# Patient Record
Sex: FEMALE | Race: WHITE | NOT HISPANIC OR LATINO | Employment: UNEMPLOYED | URBAN - METROPOLITAN AREA
[De-identification: names, ages, dates, MRNs, and addresses within clinical notes are randomized per-mention and may not be internally consistent; named-entity substitution may affect disease eponyms.]

---

## 2017-01-04 ENCOUNTER — INFUSION (OUTPATIENT)
Dept: INFUSION THERAPY | Facility: HOSPITAL | Age: 62
End: 2017-01-04
Attending: INTERNAL MEDICINE
Payer: COMMERCIAL

## 2017-01-04 DIAGNOSIS — C80.1 CANCER: Primary | ICD-10-CM

## 2017-01-04 PROCEDURE — 96401 CHEMO ANTI-NEOPL SQ/IM: CPT | Mod: PN

## 2017-01-04 PROCEDURE — 63600175 PHARM REV CODE 636 W HCPCS: Mod: PN

## 2017-01-04 RX ADMIN — DENOSUMAB 120 MG: 120 INJECTION SUBCUTANEOUS at 12:01

## 2017-01-04 NOTE — PLAN OF CARE
Problem: Patient Care Overview  Goal: Plan of Care Review  Outcome: Ongoing (interventions implemented as appropriate)  Pt. Completed treatment, tolerated xgeva injection without noted distress.Vtial signs stable. Patient discharged from infusion center . Patient had all present questions answered. Reviewed upcoming appointments.

## 2017-01-04 NOTE — MR AVS SNAPSHOT
Patient Information     Patient Name Sex Yasmin Lyon Female 1955      Visit Information        Provider Department Dept Phone Center    2017 12:30 PM CHAIR 15, [facility] [phone] [facility] at [facility]      Patient Instructions     None      Your Current Medications Are     aspirin 81 MG Chew    atorvastatin (LIPITOR) 40 MG tablet    clopidogrel (PLAVIX) 75 mg tablet    fluticasone (FLONASE) 50 mcg/actuation nasal spray    glyBURIDE-metformin 5-500 mg (GLUCOVANCE) 5-500 mg Tab    letrozole (FEMARA) 2.5 mg Tab    levothyroxine 50 mcg Cap    liothyronine (CYTOMEL) 5 MCG Tab    lisinopril (PRINIVIL,ZESTRIL) 20 MG tablet    ondansetron (ZOFRAN-ODT) 8 MG TbDL    ONETOUCH ULTRA TEST Strp    oxycodone-acetaminophen (PERCOCET)  mg per tablet    zolpidem (AMBIEN CR) 12.5 MG CR tablet      Facility-Administered Medications     denosumab (XGEVA) solution 120 mg      Appointments for Next Year     2017 11:30 AM ESTABLISHED PATIENT (15 min.) [facility] Oncology Dipesh Betancourt DO    Arrive at check-in approximately 15 minutes before your scheduled appointment time. Bring all outside medical records and imaging, along with a list of your current medications and insurance card.    2017  1:30 PM INFUSION 030 MIN (30 min.) [facility] CHAIR 23, [facility] CHEMO    Arrive at check-in approximately 15 minutes before your scheduled appointment time. Bring all outside medical records and imaging, along with a list of your current medications and insurance card.    1st Floor    2017 12:30 PM ESTABLISHED PATIENT (30 min.) [facility] - Cardiology Mirela Ivan MD    Arrive at check-in approximately 15 minutes before your scheduled appointment time. Bring all outside medical records and imaging, along with a list of your current medications and insurance card.         Default Flowsheet Data (last 24 hours)      Amb Complex  Vitals [place]        01/04/17 1149                Measurements    Weight 86.2 kg (190 lb)        /75        Temp 96.8 °F (36 °C)        Pulse 80        Resp 16        Pain Assessment    Pain Score Zero                Allergies     Bactrim [Sulfamethoxazole-trimethoprim] Other (See Comments)    Cause tongue to turn black    Morphine Rash    Phenergan [Promethazine] Other (See Comments)      Medications You Received from 01/03/2017 1243 to 01/04/2017 1243        Date/Time Order Dose Route Action     01/04/2017 1241 denosumab (XGEVA) solution 120 mg 120 mg Subcutaneous Given      Current Discharge Medication List     Cannot display discharge medications since this is not an admission.

## 2017-02-01 ENCOUNTER — INFUSION (OUTPATIENT)
Dept: INFUSION THERAPY | Facility: HOSPITAL | Age: 62
End: 2017-02-01
Attending: INTERNAL MEDICINE
Payer: COMMERCIAL

## 2017-02-01 DIAGNOSIS — C80.1 CANCER: Primary | ICD-10-CM

## 2017-02-01 PROCEDURE — 63600175 PHARM REV CODE 636 W HCPCS: Mod: PN

## 2017-02-01 PROCEDURE — 96401 CHEMO ANTI-NEOPL SQ/IM: CPT | Mod: PN

## 2017-02-01 RX ADMIN — DENOSUMAB 120 MG: 120 INJECTION SUBCUTANEOUS at 01:02

## 2017-02-01 NOTE — MR AVS SNAPSHOT
"Patient Information     Patient Name Sex Yasmin Lyon Female 1955      Visit Information        Provider Department Dept Phone Center    2017 1:30 PM CHAIR Live [facility] [phone] [facility] at [facility]      Patient Instructions     None      Your Current Medications Are     aspirin 81 MG Chew    atorvastatin (LIPITOR) 40 MG tablet    clopidogrel (PLAVIX) 75 mg tablet    fluticasone (FLONASE) 50 mcg/actuation nasal spray    glyBURIDE-metformin 5-500 mg (GLUCOVANCE) 5-500 mg Tab    letrozole (FEMARA) 2.5 mg Tab    levothyroxine (SYNTHROID) 50 MCG tablet    levothyroxine 50 mcg Cap    liothyronine (CYTOMEL) 5 MCG Tab    lisinopril (PRINIVIL,ZESTRIL) 20 MG tablet    ondansetron (ZOFRAN-ODT) 8 MG TbDL    ONETOUCH ULTRA TEST Strp    oxycodone-acetaminophen (PERCOCET)  mg per tablet    zolpidem (AMBIEN CR) 12.5 MG CR tablet      Facility-Administered Medications     denosumab (XGEVA) solution 120 mg      Appointments for Next Year     3/1/2017  2:00 PM ESTABLISHED PATIENT (15 min.) [facility] Oncology Dipesh Betancourt DO    Arrive at check-in approximately 15 minutes before your scheduled appointment time. Bring all outside medical records and imaging, along with a list of your current medications and insurance card.    2017 12:30 PM ESTABLISHED PATIENT (30 min.) [facility] - Cardiology Mriela Ivan MD    Arrive at check-in approximately 15 minutes before your scheduled appointment time. Bring all outside medical records and imaging, along with a list of your current medications and insurance card.         Default Flowsheet Data (last 24 hours)      Amb Complex Vitals [place]        17 1127                Measurements    Weight 92.3 kg (203 lb 6 oz)        Height 5' 4" (1.626 m)        BSA (Calculated - sq m) 2.04 sq meters        BMI (Calculated) 35        BP (!)  156/90        Temp 97.8 °F (36.6 °C)        Pulse 93        Resp 16   "      Pain Assessment    Pain Score Zero                Allergies     Bactrim [Sulfamethoxazole-trimethoprim] Other (See Comments)    Cause tongue to turn black    Morphine Rash    Phenergan [Promethazine] Other (See Comments)      Medications You Received from 01/31/2017 8893 to 02/01/2017 1333        Date/Time Order Dose Route Action     02/01/2017 1327 denosumab (XGEVA) solution 120 mg 120 mg Subcutaneous Given      Current Discharge Medication List     Cannot display discharge medications since this is not an admission.

## 2017-03-01 ENCOUNTER — INFUSION (OUTPATIENT)
Dept: INFUSION THERAPY | Facility: HOSPITAL | Age: 62
End: 2017-03-01
Attending: INTERNAL MEDICINE
Payer: COMMERCIAL

## 2017-03-01 DIAGNOSIS — C80.1 CANCER: Primary | ICD-10-CM

## 2017-03-01 PROCEDURE — 96401 CHEMO ANTI-NEOPL SQ/IM: CPT | Mod: PN

## 2017-03-01 PROCEDURE — 63600175 PHARM REV CODE 636 W HCPCS: Mod: PN | Performed by: PHYSICIAN ASSISTANT

## 2017-03-01 RX ADMIN — DENOSUMAB 120 MG: 120 INJECTION SUBCUTANEOUS at 02:03

## 2017-03-01 NOTE — MR AVS SNAPSHOT
Patient Information     Patient Name Sex Yasmin Lyon Female 1955      Visit Information        Provider Department Dept Phone Center    3/1/2017 2:30 PM CHAIR 29, [facility] CHEMO [facility] [phone] [facility] at [facility]      Patient Instructions     None      Your Current Medications Are     aspirin 81 MG Chew    atorvastatin (LIPITOR) 40 MG tablet    clopidogrel (PLAVIX) 75 mg tablet    fluticasone (FLONASE) 50 mcg/actuation nasal spray    glyBURIDE-metformin 5-500 mg (GLUCOVANCE) 5-500 mg Tab    letrozole (FEMARA) 2.5 mg Tab    levothyroxine (SYNTHROID) 50 MCG tablet    levothyroxine 50 mcg Cap    liothyronine (CYTOMEL) 5 MCG Tab    lisinopril (PRINIVIL,ZESTRIL) 20 MG tablet    ondansetron (ZOFRAN-ODT) 8 MG TbDL    ONETOUCH ULTRA TEST Strp    oxycodone-acetaminophen (PERCOCET)  mg per tablet    zolpidem (AMBIEN CR) 12.5 MG CR tablet      Facility-Administered Medications     denosumab (XGEVA) solution 120 mg      Appointments for Next Year     3/29/2017  2:45 PM ESTABLISHED PATIENT (15 min.) [facility] Oncology Dipesh Betancourt DO    Arrive at check-in approximately 15 minutes before your scheduled appointment time. Bring all outside medical records and imaging, along with a list of your current medications and insurance card.    3/29/2017  3:30 PM INFUSION 030 MIN (30 min.) [facility] CHAIR 19, [facility]HS CHEMO    Arrive at check-in approximately 15 minutes before your scheduled appointment time. Bring all outside medical records and imaging, along with a list of your current medications and insurance card.    1st Floor    2017 12:30 PM ESTABLISHED PATIENT (30 min.) [facility] - Cardiology Mirela Ivan MD    Arrive at check-in approximately 15 minutes before your scheduled appointment time. Bring all outside medical records and imaging, along with a list of your current medications and insurance card.         Default  Flowsheet Data (last 24 hours)      Amb Complex Vitals [place]        03/01/17 1438 03/01/17 1405             Measurements    Weight  95.5 kg (210 lb 8 oz)       BP  127/79       Temp  97.2 °F (36.2 °C)       Pulse  75       Resp  16       Pain Assessment    Pain Score Zero Zero               Allergies     Bactrim [Sulfamethoxazole-trimethoprim] Other (See Comments)    Cause tongue to turn black    Morphine Rash    Phenergan [Promethazine] Other (See Comments)      Medications You Received from 02/28/2017 1442 to 03/01/2017 1442        Date/Time Order Dose Route Action     03/01/2017 1439 denosumab (XGEVA) solution 120 mg 120 mg Subcutaneous Given      Current Discharge Medication List     Cannot display discharge medications since this is not an admission.

## 2017-03-01 NOTE — PLAN OF CARE
Problem: Patient Care Overview  Goal: Plan of Care Review  Outcome: Ongoing (interventions implemented as appropriate)  Pt tolerated Xgeva injection well.  Reminded to inform dentist prior to any dental procedures.  Instructed to call MD with any problems.

## 2017-03-29 ENCOUNTER — INFUSION (OUTPATIENT)
Dept: INFUSION THERAPY | Facility: HOSPITAL | Age: 62
End: 2017-03-29
Attending: INTERNAL MEDICINE
Payer: COMMERCIAL

## 2017-03-29 DIAGNOSIS — C80.1 CANCER: Primary | ICD-10-CM

## 2017-03-29 PROCEDURE — 63600175 PHARM REV CODE 636 W HCPCS: Mod: PN | Performed by: PHYSICIAN ASSISTANT

## 2017-03-29 PROCEDURE — 96401 CHEMO ANTI-NEOPL SQ/IM: CPT | Mod: PN

## 2017-03-29 RX ADMIN — DENOSUMAB 120 MG: 120 INJECTION SUBCUTANEOUS at 03:03

## 2017-03-29 NOTE — MR AVS SNAPSHOT
"Patient Information     Patient Name Sex Yasmin Lyon Female 1955      Visit Information        Provider Department Dept Phone Center    3/29/2017 3:30 PM CHAIR 08 [facility] CHE[facility] [phone] [facility] at [facility]      Patient Instructions      Calcium (Blood)  Does this test have other names?  Total calcium, ionized calcium  What is this test?  A calcium blood test measures how much calcium is in your blood. Your health care provider can use this test to help diagnose and watch many conditions. There are two types of calcium blood tests. One is total calcium and the other is ionized calcium. Ionized calcium measures the "free" calcium in your blood. This is the calcium not bound to other parts of the blood.   Why do I need this test?  Your health care provider may order a calcium blood test to help diagnose a variety of disorders. These include kidney disease, pancreatitis, and disease of the parathyroid gland. Calcium levels may also be abnormal in many types of cancer. Your provider might also order this test as part of a routine health check.  A normal calcium level in the blood is a good sign that your body is likely working as it should. Calcium levels that are too low (hypocalcemia) or too high (hypercalcemia) can mean of a number of problems.  People with abnormal calcium levels may not have any symptoms. Very low calcium levels can cause seizures, irregular heartbeat, muscle spasms, or tingling in the hands or feet. People with high calcium levels may have nausea, vomiting, severe thirst, or constipation. Your health care provider will use the results of a blood calcium test to figure out how to treat the underlying cause of any health problems you may have.  What other tests might I have along with this test?  Calcium can be tested for a number of reasons. Other tests will vary based on what your health care provider is looking for.   Your provider may also " order tests of kidney function, vitamin D, phosphorus levels, and parathyroid hormone. These tests can help figure out what is causing your abnormal calcium levels.  What do my test results mean?  Many things may affect your lab test results. These include the method each lab uses to do the test. Some laboratories may have slightly different normal values than the ones below. Even if your test results are different from the normal value, you may not have a problem. To learn what the results mean for you, talk with your health care provider.  A normal range of total blood calcium in adults is usually between 8.5 and 10.3 milligrams/deciliter (mg/dL). Ionized calcium generally should be higher than 4.6 mg/dL to be a normal level.   How is this test done?  The test requires a blood sample, which is drawn through a needle from a vein in your arm.  Does this test pose any risks?  Taking a blood sample with a needle carries risks that include bleeding, infection, bruising, or feeling dizzy. When the needle pricks your arm, you may feel a slight stinging sensation or pain. Afterward, the site may be slightly sore.  What might affect my test results?  A number of things can affect the results of a calcium blood test. This test is typically done at the same time as other blood tests to get a better picture of your overall health. Certain medicines can change blood calcium levels and affect the test results.   How do I get ready for this test?  You don't need to prepare for this test. Be sure your health care provider knows about all medicines, herbs, vitamins, and supplements you are taking. This includes medicines that don't need a prescription and any illicit drugs you may use.    Date Last Reviewed: 6/28/2015  © 5645-3772 [employer]. [address] [place] [address], [place] [address]. All rights reserved. This information is not intended as a substitute for professional medical care. Always follow your healthcare  professional's instructions.        Nutrition and MyPlate: Dairy    The dairy group includes foods that are made from milk and are also high in calcium (a nutrient that builds strong bones). If you're lactose-intolerant, special milk products can help. If you're allergic to dairy, be sure to get your calcium from leafy greens (such as mustard or carmela greens) and from calcium-fortified foods (such as orange juice and soy products).  Nutrient-rich choices   It's best to choose low-fat or nonfat dairy products. Nutrient-rich choices include:  · Good old-fashioned milk (low-fat or nonfat). If you don't like the flavor, stir in a little chocolate syrup, or vanilla or almond extract. The nutrients in the milk outweigh the added calories.  · Low-fat or nonfat cheese, cottage cheese, and yogurt.  · Foods made with these products, such as cream of broccoli soup made with nonfat milk or quesadillas made with low-fat cheese.  What makes dairy less healthy?  · Many dairy products are high in fat. Always look for low-fat or nonfat varieties. You can ease into this. If you drink whole milk now, make the move to 2% milk first, then to fat-free or low-fat (1%) milk.  · Most cheeses are high in fat. If you select a cheese with a strong taste, you may eat less than you would of a milder cheese. Also look for low-fat cheese or cheese made with part skim milk.  · Added sugar, such as in ice cream and frozen yogurt, makes dairy products less healthy. Compare food labels to find brands lower in fat and calories.  One small change  Drink low-fat or nonfat milk with at least one meal each day. Have a better idea? Write it here:     _____________________________________________________________  Date Last Reviewed: 6/25/2015  © 5675-5664 [employer]. [address] [place] [address], [place] [address]. All rights reserved. This information is not intended as a substitute for professional medical care. Always follow your healthcare  professional's instructions.             Your Current Medications Are     aspirin 81 MG Chew    atorvastatin (LIPITOR) 40 MG tablet    clopidogrel (PLAVIX) 75 mg tablet    fluticasone (FLONASE) 50 mcg/actuation nasal spray    glyBURIDE-metformin 5-500 mg (GLUCOVANCE) 5-500 mg Tab    letrozole (FEMARA) 2.5 mg Tab    levothyroxine (SYNTHROID) 125 MCG tablet    levothyroxine 50 mcg Cap    liothyronine (CYTOMEL) 5 MCG Tab    lisinopril (PRINIVIL,ZESTRIL) 20 MG tablet    ondansetron (ZOFRAN-ODT) 8 MG TbDL    ONETOUCH ULTRA TEST Strp    oxycodone-acetaminophen (PERCOCET)  mg per tablet    zolpidem (AMBIEN CR) 12.5 MG CR tablet    levothyroxine (SYNTHROID) 50 MCG tablet (Discontinued)      Facility-Administered Medications     denosumab (XGEVA) solution 120 mg      Appointments for Next Year     4/26/2017  3:00 PM ESTABLISHED PATIENT (15 min.) [facility] Oncology Dipesh Betancourt DO    Arrive at check-in approximately 15 minutes before your scheduled appointment time. Bring all outside medical records and imaging, along with a list of your current medications and insurance card.    4/26/2017  3:30 PM INFUSION 030 MIN (30 min.) [facility] CHAIR 13, [facility] CHEMO    Arrive at check-in approximately 15 minutes before your scheduled appointment time. Bring all outside medical records and imaging, along with a list of your current medications and insurance card.    1st Floor    6/8/2017 12:30 PM ESTABLISHED PATIENT (30 min.) [facility] - Cardiology Mirela Ivan MD    Arrive at check-in approximately 15 minutes before your scheduled appointment time. Bring all outside medical records and imaging, along with a list of your current medications and insurance card.         Default Flowsheet Data (last 24 hours)      Amb Complex Vitals [place]        03/29/17 1438                Measurements    Weight 99.3 kg (219 lb)        BP (!)  141/87        Temp 97.2 °F (36.2 °C)        Pulse 95         Resp 16        Pain Assessment    Pain Score Three        Pain Loc GENERALIZED                Allergies     Bactrim [Sulfamethoxazole-trimethoprim] Other (See Comments)    Cause tongue to turn black    Morphine Rash    Phenergan [Promethazine] Other (See Comments)      Medications You Received from 03/28/2017 1545 to 03/29/2017 1545        Date/Time Order Dose Route Action     03/29/2017 1542 denosumab (XGEVA) solution 120 mg 120 mg Subcutaneous Given      Current Discharge Medication List     Cannot display discharge medications since this is not an admission.

## 2017-03-29 NOTE — PATIENT INSTRUCTIONS
"  Calcium (Blood)  Does this test have other names?  Total calcium, ionized calcium  What is this test?  A calcium blood test measures how much calcium is in your blood. Your health care provider can use this test to help diagnose and watch many conditions. There are two types of calcium blood tests. One is total calcium and the other is ionized calcium. Ionized calcium measures the "free" calcium in your blood. This is the calcium not bound to other parts of the blood.   Why do I need this test?  Your health care provider may order a calcium blood test to help diagnose a variety of disorders. These include kidney disease, pancreatitis, and disease of the parathyroid gland. Calcium levels may also be abnormal in many types of cancer. Your provider might also order this test as part of a routine health check.  A normal calcium level in the blood is a good sign that your body is likely working as it should. Calcium levels that are too low (hypocalcemia) or too high (hypercalcemia) can mean of a number of problems.  People with abnormal calcium levels may not have any symptoms. Very low calcium levels can cause seizures, irregular heartbeat, muscle spasms, or tingling in the hands or feet. People with high calcium levels may have nausea, vomiting, severe thirst, or constipation. Your health care provider will use the results of a blood calcium test to figure out how to treat the underlying cause of any health problems you may have.  What other tests might I have along with this test?  Calcium can be tested for a number of reasons. Other tests will vary based on what your health care provider is looking for.   Your provider may also order tests of kidney function, vitamin D, phosphorus levels, and parathyroid hormone. These tests can help figure out what is causing your abnormal calcium levels.  What do my test results mean?  Many things may affect your lab test results. These include the method each lab uses to do the " test. Some laboratories may have slightly different normal values than the ones below. Even if your test results are different from the normal value, you may not have a problem. To learn what the results mean for you, talk with your health care provider.  A normal range of total blood calcium in adults is usually between 8.5 and 10.3 milligrams/deciliter (mg/dL). Ionized calcium generally should be higher than 4.6 mg/dL to be a normal level.   How is this test done?  The test requires a blood sample, which is drawn through a needle from a vein in your arm.  Does this test pose any risks?  Taking a blood sample with a needle carries risks that include bleeding, infection, bruising, or feeling dizzy. When the needle pricks your arm, you may feel a slight stinging sensation or pain. Afterward, the site may be slightly sore.  What might affect my test results?  A number of things can affect the results of a calcium blood test. This test is typically done at the same time as other blood tests to get a better picture of your overall health. Certain medicines can change blood calcium levels and affect the test results.   How do I get ready for this test?  You don't need to prepare for this test. Be sure your health care provider knows about all medicines, herbs, vitamins, and supplements you are taking. This includes medicines that don't need a prescription and any illicit drugs you may use.    Date Last Reviewed: 6/28/2015  © 6112-8233 [employer]. [address] [place] [address]. All rights reserved. This information is not intended as a substitute for professional medical care. Always follow your healthcare professional's instructions.        Nutrition and MyPlate: Dairy    The dairy group includes foods that are made from milk and are also high in calcium (a nutrient that builds strong bones). If you're lactose-intolerant, special milk products can help. If you're allergic to dairy, be sure to  get your calcium from leafy greens (such as mustard or carmela greens) and from calcium-fortified foods (such as orange juice and soy products).  Nutrient-rich choices   It's best to choose low-fat or nonfat dairy products. Nutrient-rich choices include:  · Good old-fashioned milk (low-fat or nonfat). If you don't like the flavor, stir in a little chocolate syrup, or vanilla or almond extract. The nutrients in the milk outweigh the added calories.  · Low-fat or nonfat cheese, cottage cheese, and yogurt.  · Foods made with these products, such as cream of broccoli soup made with nonfat milk or quesadillas made with low-fat cheese.  What makes dairy less healthy?  · Many dairy products are high in fat. Always look for low-fat or nonfat varieties. You can ease into this. If you drink whole milk now, make the move to 2% milk first, then to fat-free or low-fat (1%) milk.  · Most cheeses are high in fat. If you select a cheese with a strong taste, you may eat less than you would of a milder cheese. Also look for low-fat cheese or cheese made with part skim milk.  · Added sugar, such as in ice cream and frozen yogurt, makes dairy products less healthy. Compare food labels to find brands lower in fat and calories.  One small change  Drink low-fat or nonfat milk with at least one meal each day. Have a better idea? Write it here:     _____________________________________________________________  Date Last Reviewed: 6/25/2015  © 7657-8200 [employer]. [address] [place] [address], [place] [address]. All rights reserved. This information is not intended as a substitute for professional medical care. Always follow your healthcare professional's instructions.

## 2017-04-26 ENCOUNTER — INFUSION (OUTPATIENT)
Dept: INFUSION THERAPY | Facility: HOSPITAL | Age: 62
End: 2017-04-26
Attending: INTERNAL MEDICINE
Payer: COMMERCIAL

## 2017-04-26 DIAGNOSIS — C80.1 CANCER: Primary | ICD-10-CM

## 2017-04-26 PROCEDURE — 25000003 PHARM REV CODE 250: Mod: PN | Performed by: INTERNAL MEDICINE

## 2017-04-26 PROCEDURE — 96360 HYDRATION IV INFUSION INIT: CPT | Mod: PN

## 2017-04-26 PROCEDURE — 96401 CHEMO ANTI-NEOPL SQ/IM: CPT | Mod: PN

## 2017-04-26 PROCEDURE — 96361 HYDRATE IV INFUSION ADD-ON: CPT | Mod: PN

## 2017-04-26 PROCEDURE — 63600175 PHARM REV CODE 636 W HCPCS: Mod: PN | Performed by: PHYSICIAN ASSISTANT

## 2017-04-26 RX ORDER — SODIUM CHLORIDE 0.9 % (FLUSH) 0.9 %
10 SYRINGE (ML) INJECTION
Status: DISCONTINUED | OUTPATIENT
Start: 2017-04-26 | End: 2017-04-26 | Stop reason: HOSPADM

## 2017-04-26 RX ORDER — HEPARIN 100 UNIT/ML
500 SYRINGE INTRAVENOUS
Status: DISCONTINUED | OUTPATIENT
Start: 2017-04-26 | End: 2017-04-26 | Stop reason: HOSPADM

## 2017-04-26 RX ORDER — HEPARIN 100 UNIT/ML
500 SYRINGE INTRAVENOUS
Status: CANCELLED | OUTPATIENT
Start: 2017-04-26

## 2017-04-26 RX ORDER — SODIUM CHLORIDE 0.9 % (FLUSH) 0.9 %
10 SYRINGE (ML) INJECTION
Status: CANCELLED | OUTPATIENT
Start: 2017-04-26

## 2017-04-26 RX ADMIN — SODIUM CHLORIDE 1000 ML: 900 INJECTION, SOLUTION INTRAVENOUS at 03:04

## 2017-04-26 RX ADMIN — DENOSUMAB 120 MG: 120 INJECTION SUBCUTANEOUS at 03:04

## 2017-04-26 RX ADMIN — SODIUM CHLORIDE, PRESERVATIVE FREE 10 ML: 5 INJECTION INTRAVENOUS at 03:04

## 2017-05-10 PROBLEM — I25.10 CORONARY ARTERY DISEASE INVOLVING NATIVE CORONARY ARTERY OF NATIVE HEART WITHOUT ANGINA PECTORIS: Status: ACTIVE | Noted: 2017-05-10

## 2017-05-24 PROBLEM — I20.0 UNSTABLE ANGINA: Status: ACTIVE | Noted: 2017-05-24

## 2017-05-24 PROBLEM — N17.9 AKI (ACUTE KIDNEY INJURY): Status: ACTIVE | Noted: 2017-05-24

## 2017-05-24 PROBLEM — E03.9 HYPOTHYROID: Status: ACTIVE | Noted: 2017-05-24

## 2017-05-25 PROBLEM — I20.0 UNSTABLE ANGINA: Status: ACTIVE | Noted: 2017-05-25

## 2017-05-31 ENCOUNTER — INFUSION (OUTPATIENT)
Dept: INFUSION THERAPY | Facility: HOSPITAL | Age: 62
End: 2017-05-31
Attending: INTERNAL MEDICINE
Payer: COMMERCIAL

## 2017-05-31 VITALS — HEART RATE: 92 BPM | DIASTOLIC BLOOD PRESSURE: 73 MMHG | SYSTOLIC BLOOD PRESSURE: 139 MMHG

## 2017-05-31 DIAGNOSIS — C80.1 CANCER: Primary | ICD-10-CM

## 2017-05-31 PROCEDURE — 96401 CHEMO ANTI-NEOPL SQ/IM: CPT | Mod: PN

## 2017-05-31 PROCEDURE — 96372 THER/PROPH/DIAG INJ SC/IM: CPT | Mod: PN

## 2017-05-31 PROCEDURE — 63600175 PHARM REV CODE 636 W HCPCS: Mod: PN | Performed by: PHYSICIAN ASSISTANT

## 2017-05-31 RX ORDER — HEPARIN 100 UNIT/ML
500 SYRINGE INTRAVENOUS
Status: CANCELLED | OUTPATIENT
Start: 2017-05-31

## 2017-05-31 RX ORDER — SODIUM CHLORIDE 0.9 % (FLUSH) 0.9 %
10 SYRINGE (ML) INJECTION
Status: CANCELLED | OUTPATIENT
Start: 2017-05-31

## 2017-05-31 RX ADMIN — DENOSUMAB 120 MG: 120 INJECTION SUBCUTANEOUS at 02:05

## 2017-05-31 NOTE — NURSING
Patient feels tired from recouping from heart cath twice in 3 weeks with stents placed.bruises form IV noted form hospital stay on arms. Verbalizes understanding to report and signs of infection or dehydration. Denies SOB or chest pain at time of discharge.

## 2017-06-28 ENCOUNTER — INFUSION (OUTPATIENT)
Dept: INFUSION THERAPY | Facility: HOSPITAL | Age: 62
End: 2017-06-28
Attending: INTERNAL MEDICINE
Payer: COMMERCIAL

## 2017-06-28 VITALS
HEIGHT: 64 IN | RESPIRATION RATE: 16 BRPM | WEIGHT: 218 LBS | BODY MASS INDEX: 37.22 KG/M2 | SYSTOLIC BLOOD PRESSURE: 119 MMHG | DIASTOLIC BLOOD PRESSURE: 82 MMHG | HEART RATE: 90 BPM | TEMPERATURE: 98 F

## 2017-06-28 DIAGNOSIS — C80.1 CANCER: Primary | ICD-10-CM

## 2017-06-28 PROCEDURE — 63600175 PHARM REV CODE 636 W HCPCS: Mod: PN | Performed by: PHYSICIAN ASSISTANT

## 2017-06-28 PROCEDURE — 96372 THER/PROPH/DIAG INJ SC/IM: CPT | Mod: PN

## 2017-06-28 RX ORDER — HEPARIN 100 UNIT/ML
500 SYRINGE INTRAVENOUS
Status: CANCELLED | OUTPATIENT
Start: 2017-06-28

## 2017-06-28 RX ORDER — SODIUM CHLORIDE 0.9 % (FLUSH) 0.9 %
10 SYRINGE (ML) INJECTION
Status: CANCELLED | OUTPATIENT
Start: 2017-06-28

## 2017-06-28 RX ADMIN — DENOSUMAB 120 MG: 120 INJECTION SUBCUTANEOUS at 04:06

## 2017-06-28 NOTE — PLAN OF CARE
Problem: Patient Care Overview  Goal: Plan of Care Review  Outcome: Ongoing (interventions implemented as appropriate)  Pt tolerated Xgeva injection well.  Pt states she takes Vitamin D daily but she does not take Calcium.  She states she asked her doctor about taking it and was told she did not need to take it.  She is aware to notify her dentist prior to any dental procedures.  Instructed to call MD with any problems.

## 2017-07-26 ENCOUNTER — INFUSION (OUTPATIENT)
Dept: INFUSION THERAPY | Facility: HOSPITAL | Age: 62
End: 2017-07-26
Attending: INTERNAL MEDICINE
Payer: COMMERCIAL

## 2017-07-26 VITALS
SYSTOLIC BLOOD PRESSURE: 124 MMHG | HEIGHT: 64 IN | RESPIRATION RATE: 20 BRPM | TEMPERATURE: 96 F | BODY MASS INDEX: 37.22 KG/M2 | WEIGHT: 218 LBS | DIASTOLIC BLOOD PRESSURE: 75 MMHG | HEART RATE: 100 BPM

## 2017-07-26 DIAGNOSIS — C80.1 CANCER: Primary | ICD-10-CM

## 2017-07-26 PROCEDURE — 96372 THER/PROPH/DIAG INJ SC/IM: CPT | Mod: PN

## 2017-07-26 PROCEDURE — 63600175 PHARM REV CODE 636 W HCPCS: Mod: PN | Performed by: PHYSICIAN ASSISTANT

## 2017-07-26 RX ORDER — HEPARIN 100 UNIT/ML
500 SYRINGE INTRAVENOUS
Status: CANCELLED | OUTPATIENT
Start: 2017-07-26

## 2017-07-26 RX ORDER — SODIUM CHLORIDE 0.9 % (FLUSH) 0.9 %
10 SYRINGE (ML) INJECTION
Status: CANCELLED | OUTPATIENT
Start: 2017-07-26

## 2017-07-26 RX ADMIN — DENOSUMAB 120 MG: 120 INJECTION SUBCUTANEOUS at 01:07

## 2017-08-23 ENCOUNTER — INFUSION (OUTPATIENT)
Dept: INFUSION THERAPY | Facility: HOSPITAL | Age: 62
End: 2017-08-23
Attending: INTERNAL MEDICINE
Payer: COMMERCIAL

## 2017-08-23 VITALS
RESPIRATION RATE: 16 BRPM | HEART RATE: 98 BPM | SYSTOLIC BLOOD PRESSURE: 114 MMHG | TEMPERATURE: 97 F | DIASTOLIC BLOOD PRESSURE: 71 MMHG

## 2017-08-23 DIAGNOSIS — C80.1 CANCER: Primary | ICD-10-CM

## 2017-08-23 PROCEDURE — 96401 CHEMO ANTI-NEOPL SQ/IM: CPT | Mod: PN

## 2017-08-23 PROCEDURE — 63600175 PHARM REV CODE 636 W HCPCS: Mod: PN | Performed by: PHYSICIAN ASSISTANT

## 2017-08-23 RX ORDER — HEPARIN 100 UNIT/ML
500 SYRINGE INTRAVENOUS
Status: CANCELLED | OUTPATIENT
Start: 2017-08-23

## 2017-08-23 RX ORDER — SODIUM CHLORIDE 0.9 % (FLUSH) 0.9 %
10 SYRINGE (ML) INJECTION
Status: CANCELLED | OUTPATIENT
Start: 2017-08-23

## 2017-08-23 RX ADMIN — DENOSUMAB 120 MG: 120 INJECTION SUBCUTANEOUS at 04:08

## 2017-08-23 NOTE — PLAN OF CARE
Problem: Patient Care Overview  Goal: Plan of Care Review  Outcome: Ongoing (interventions implemented as appropriate)  Tolerated Xgeva well, schedule given to pt

## 2017-09-27 ENCOUNTER — INFUSION (OUTPATIENT)
Dept: INFUSION THERAPY | Facility: HOSPITAL | Age: 62
End: 2017-09-27
Attending: INTERNAL MEDICINE
Payer: COMMERCIAL

## 2017-09-27 VITALS
TEMPERATURE: 97 F | RESPIRATION RATE: 16 BRPM | HEIGHT: 64 IN | WEIGHT: 219 LBS | SYSTOLIC BLOOD PRESSURE: 157 MMHG | BODY MASS INDEX: 37.39 KG/M2 | DIASTOLIC BLOOD PRESSURE: 89 MMHG | HEART RATE: 93 BPM

## 2017-09-27 DIAGNOSIS — Z85.3 HISTORY OF BREAST CANCER: Primary | ICD-10-CM

## 2017-09-27 PROCEDURE — 63600175 PHARM REV CODE 636 W HCPCS: Mod: PN | Performed by: PHYSICIAN ASSISTANT

## 2017-09-27 PROCEDURE — 96372 THER/PROPH/DIAG INJ SC/IM: CPT

## 2017-09-27 PROCEDURE — 96401 CHEMO ANTI-NEOPL SQ/IM: CPT | Mod: PN

## 2017-09-27 RX ADMIN — DENOSUMAB 120 MG: 120 INJECTION SUBCUTANEOUS at 02:09

## 2017-09-27 NOTE — PLAN OF CARE
Problem: Patient Care Overview  Goal: Plan of Care Review  Pt tolerated xgeva injection well

## 2017-10-30 ENCOUNTER — INFUSION (OUTPATIENT)
Dept: INFUSION THERAPY | Facility: HOSPITAL | Age: 62
End: 2017-10-30
Attending: INTERNAL MEDICINE
Payer: COMMERCIAL

## 2017-10-30 VITALS
RESPIRATION RATE: 16 BRPM | SYSTOLIC BLOOD PRESSURE: 155 MMHG | BODY MASS INDEX: 36.37 KG/M2 | HEART RATE: 94 BPM | HEIGHT: 64 IN | DIASTOLIC BLOOD PRESSURE: 88 MMHG | WEIGHT: 213 LBS | TEMPERATURE: 98 F

## 2017-10-30 DIAGNOSIS — Z85.3 HISTORY OF BREAST CANCER: Primary | ICD-10-CM

## 2017-10-30 PROCEDURE — 96401 CHEMO ANTI-NEOPL SQ/IM: CPT | Mod: PN

## 2017-10-30 PROCEDURE — 63600175 PHARM REV CODE 636 W HCPCS: Mod: PN | Performed by: PHYSICIAN ASSISTANT

## 2017-10-30 PROCEDURE — 96372 THER/PROPH/DIAG INJ SC/IM: CPT

## 2017-10-30 RX ADMIN — DENOSUMAB 120 MG: 120 INJECTION SUBCUTANEOUS at 02:10

## 2017-11-27 ENCOUNTER — INFUSION (OUTPATIENT)
Dept: INFUSION THERAPY | Facility: HOSPITAL | Age: 62
End: 2017-11-27
Attending: INTERNAL MEDICINE
Payer: COMMERCIAL

## 2017-11-27 VITALS
BODY MASS INDEX: 36.54 KG/M2 | RESPIRATION RATE: 17 BRPM | TEMPERATURE: 97 F | HEART RATE: 88 BPM | HEIGHT: 64 IN | DIASTOLIC BLOOD PRESSURE: 66 MMHG | WEIGHT: 214 LBS | SYSTOLIC BLOOD PRESSURE: 134 MMHG

## 2017-11-27 DIAGNOSIS — Z85.3 HISTORY OF BREAST CANCER: Primary | ICD-10-CM

## 2017-11-27 PROCEDURE — 63600175 PHARM REV CODE 636 W HCPCS: Mod: PN | Performed by: PHYSICIAN ASSISTANT

## 2017-11-27 PROCEDURE — 96372 THER/PROPH/DIAG INJ SC/IM: CPT | Mod: PN

## 2017-11-27 RX ORDER — ZINC GLUCONATE 50 MG
50 TABLET ORAL WEEKLY
COMMUNITY

## 2017-11-27 RX ORDER — LISINOPRIL 10 MG/1
TABLET ORAL
Refills: 3 | COMMUNITY
Start: 2017-11-02 | End: 2019-07-30 | Stop reason: SDUPTHER

## 2017-11-27 RX ORDER — CYANOCOBALAMIN (VITAMIN B-12) 1000MCG/15
1 LIQUID (ML) ORAL DAILY
COMMUNITY

## 2017-11-27 RX ADMIN — DENOSUMAB 120 MG: 120 INJECTION SUBCUTANEOUS at 03:11

## 2017-11-28 NOTE — PLAN OF CARE
Problem: Patient Care Overview  Goal: Plan of Care Review  Outcome: Ongoing (interventions implemented as appropriate)  Tolerated xgeva injection without any c/o; RTC as directed; Verb agreement with plan; amb off unit independently by self

## 2017-12-21 ENCOUNTER — TELEPHONE (OUTPATIENT)
Dept: INFUSION THERAPY | Facility: HOSPITAL | Age: 62
End: 2017-12-21

## 2017-12-21 NOTE — TELEPHONE ENCOUNTER
----- Message from Destiny Maki sent at 12/21/2017  9:18 AM CST -----  Contact: pt      ----- Message -----  From: Tasha Aguiar  Sent: 12/21/2017   9:06 AM  To: [facility]    Pt calling states wants to know if she can get an earlier time on 1/4 for her injection,please..[phone] ([place])

## 2017-12-27 PROBLEM — R13.10 DYSPHAGIA: Status: ACTIVE | Noted: 2017-12-27

## 2018-01-04 ENCOUNTER — INFUSION (OUTPATIENT)
Dept: INFUSION THERAPY | Facility: HOSPITAL | Age: 63
End: 2018-01-04
Attending: INTERNAL MEDICINE
Payer: COMMERCIAL

## 2018-01-04 VITALS
SYSTOLIC BLOOD PRESSURE: 145 MMHG | WEIGHT: 222.19 LBS | BODY MASS INDEX: 37.93 KG/M2 | HEIGHT: 64 IN | TEMPERATURE: 97 F | RESPIRATION RATE: 18 BRPM | DIASTOLIC BLOOD PRESSURE: 81 MMHG | HEART RATE: 85 BPM

## 2018-01-04 DIAGNOSIS — Z85.3 HISTORY OF BREAST CANCER: Primary | ICD-10-CM

## 2018-01-04 PROCEDURE — 63600175 PHARM REV CODE 636 W HCPCS: Mod: TB,PN | Performed by: PHYSICIAN ASSISTANT

## 2018-01-04 PROCEDURE — 96372 THER/PROPH/DIAG INJ SC/IM: CPT | Mod: PN

## 2018-01-04 RX ADMIN — DENOSUMAB 120 MG: 120 INJECTION SUBCUTANEOUS at 12:01

## 2018-01-04 NOTE — PATIENT INSTRUCTIONS
Denosumab injection  What is this medicine?  DENOSUMAB (den oh naz mab) slows bone breakdown. Prolia is used to treat osteoporosis in women after menopause and in men. Xgeva is used to prevent bone fractures and other bone problems caused by cancer bone metastases. Xgeva is also used to treat giant cell tumor of the bone.  How should I use this medicine?  This medicine is for injection under the skin. It is given by a health care professional in a hospital or clinic setting.  If you are getting Prolia, a special MedGuide will be given to you by the pharmacist with each prescription and refill. Be sure to read this information carefully each time.  For Prolia, talk to your pediatrician regarding the use of this medicine in children. Special care may be needed. For Xgeva, talk to your pediatrician regarding the use of this medicine in children. While this drug may be prescribed for children as young as 13 years for selected conditions, precautions do apply.  What side effects may I notice from receiving this medicine?  Side effects that you should report to your doctor or health care professional as soon as possible:  · allergic reactions like skin rash, itching or hives, swelling of the face, lips, or tongue  · breathing problems  · chest pain  · fast, irregular heartbeat  · feeling faint or lightheaded, falls  · fever, chills, or any other sign of infection  · muscle spasms, tightening, or twitches  · numbness or tingling  · skin blisters or bumps, or is dry, peels, or red  · slow healing or unexplained pain in the mouth or jaw  · unusual bleeding or bruising  Side effects that usually do not require medical attention (Report these to your doctor or health care professional if they continue or are bothersome.):  · muscle pain  · stomach upset, gas  What may interact with this medicine?  Do not take this medicine with any of the following medications:  · other medicines containing denosumab  This medicine may also  interact with the following medications:  · medicines that suppress the immune system  · medicines that treat cancer  · steroid medicines like prednisone or cortisone  What if I miss a dose?  It is important not to miss your dose. Call your doctor or health care professional if you are unable to keep an appointment.  Where should I keep my medicine?  This medicine is only given in a clinic, doctor's office, or other health care setting and will not be stored at home.  What should I tell my health care provider before I take this medicine?  They need to know if you have any of these conditions:  · dental disease  · eczema  · infection or history of infections  · kidney disease or on dialysis  · low blood calcium or vitamin D  · malabsorption syndrome  · scheduled to have surgery or tooth extraction  · taking medicine that contains denosumab  · thyroid or parathyroid disease  · an unusual reaction to denosumab, other medicines, foods, dyes, or preservatives  · pregnant or trying to get pregnant  · breast-feeding  What should I watch for while using this medicine?  Visit your doctor or health care professional for regular checks on your progress. Your doctor or health care professional may order blood tests and other tests to see how you are doing.  Call your doctor or health care professional if you get a cold or other infection while receiving this medicine. Do not treat yourself. This medicine may decrease your body's ability to fight infection.  You should make sure you get enough calcium and vitamin D while you are taking this medicine, unless your doctor tells you not to. Discuss the foods you eat and the vitamins you take with your health care professional.  See your dentist regularly. Brush and floss your teeth as directed. Before you have any dental work done, tell your dentist you are receiving this medicine.  Do not become pregnant while taking this medicine or for 5 months after stopping it. Women should  inform their doctor if they wish to become pregnant or think they might be pregnant. There is a potential for serious side effects to an unborn child. Talk to your health care professional or pharmacist for more information.  NOTE:This sheet is a summary. It may not cover all possible information. If you have questions about this medicine, talk to your doctor, pharmacist, or health care provider. Copyright© 2017 [employer]        Preventing Falls: Are You At Risk of Falling?     Ask for help to reduce risk of falling in your home.     As you get older, you're not as steady on your feet as you once were. And you may have health problems you didn't have when you were younger. So, it's not surprising that older people are more likely to trip and fall. Falling can be very serious. It can change your overall health and quality of life. That's why it's important to be aware of your own risk of falling.  The dangers of falling  Falls are one of the main causes of injury in people over age 65. An older person who falls may take longer to get better than a younger person. And, after a fall, an older person is more likely to have problems that don't go away. So, preventing falls can help you avoid serious health problems.  Are you at risk of falling?  Answer these questions to rate your level of risk.  · Are you a woman?  · Have you fallen or stumbled in the last year?  · Are you over age 65?  · Are you ever dizzy or lightheaded with standing?  · Do you have a hard time getting in and out of the bathtub or on and off the toilet?  · Do you lean on objects to help you get around? Or do you use a cane or walker?  · Do you have vision or hearing problems? For example, do you need new glasses or hearing aids?  · Do you have 2 or more long-lasting (chronic) medical conditions?  · Do you take 3 or more medicines?  · Have you felt depressed recently?  · Have you had more trouble with your memory in recent months?  · Are there  "hazards in your home that might cause you to fall, such as loose rugs or poor lighting?  · Do you have a pet that jumps on you or might trip you?  · Have you stopped getting regular exercise?  · Do you have diabetes?   · Do you have a neurologic disease, such as Parkinson or Alzheimer disease?   · Do you drink alcohol?  · Do you wear athletic shoes or slippers, or go barefoot at home?  You can help prevent falls  If you answered "yes" to any of the above questions, you should take steps to reduce your risk of a fall. Monitoring health conditions and keeping walkways in your home free of clutter are just 2 ways. Changing is sometimes easier said than done. But keep in mind that even small changes can make you less likely to fall.  The fear of falling  It's normal to be scared of falling, especially if you've fallen before. But being afraid can actually make you more likely to fall. This is because:  · Fear might cause you to become less active. Being less active can lead to a loss of strength and balance.  · Fear can lead to isolation from others, depression, or the use of more medicines or alcohol. And all these things make falling even more likely.  To break the cycle, learn more about ways to avoid falling. As you take control, you may find yourself feeling less afraid.   Date Last Reviewed: 6/12/2015  © 2437-4629 [employer]. [address] [place] [address]. All rights reserved. This information is not intended as a substitute for professional medical care. Always follow your healthcare professional's instructions.        Discharge Instructions for Hypercalcemia  You have been diagnosed with hypercalcemia. That means you havetoo much calcium in your blood. Calcium is a mineral that helps develop bones and teeth, controls heart rhythm, and allows muscles to contract. Hypercalcemia is often the result of problems elsewhere in the body, including overactive glands, unhealthy bones, long-term " bed rest, and certain tumors or cancers.  Home care  · Ask your healthcare provider how much fluid you should drink. Most people with hypercalcemia need to drink from 3 quarts to 1 gallon (3 to 4 liters) of fluid every day, or as directed by your healthcare provider.  · Keep track of how much fluid you drink.  ¨ Fill a rinsed gallon milk jug with water or buy a gallon of water and keep it in your refrigerator.  ¨ Try to drink the entire jug of water during the course of the day, or as directed by your healthcare provider.  · Cut back on foods high in calcium.   ¨ Greatly limit or stop your intake of milk, cheese, cottage cheese, yogurt, pudding, and ice cream.  ¨ Read food labels. Dont buy dairy products with added calcium.  ¨ Calcium-fortified orange juice  ¨ Calcium-fortified ready-to-eat cereals  ¨ Canned salmon or sardines with soft bones  · Avoid antacid medicines if they list calcium as an ingredient. Many antacids contain calcium. Some contain magnesium and no calcium.  · Dont limit your salt intake.  · Exercise. If your hypercalcemia was caused by long-term bed rest, try to increase your activity if possible.  · Resume your normal activities as directed by your healthcare provider.  · Take your medicines exactly as directed.  · Tell your healthcare provider about any other medicines you are taking, including over-the-counter or herbal medicines and supplements.  · Keep all appointments for lab work and follow-up. Your healthcare provider needs to monitor your condition closely.  Follow-up  Make a follow-up appointment as directed by our staff.  When to call your healthcare provider  Call your healthcare provider right away if you have any of the following:  · Extreme fatigue  · Loss of appetite  · Trouble urinating or pain when urinating  · Blood in your urine  · Vomiting or diarrhea  · Increased thirst  · Irregular heartbeat  · Dizziness or lightheadedness  · Depression  · Confusion        Date Last  Reviewed: 6/19/2015  © 5249-4551 [employer]. [address] [place] [address], [place] [address]. All rights reserved. This information is not intended as a substitute for professional medical care. Always follow your healthcare professional's instructions.

## 2018-01-04 NOTE — PLAN OF CARE
Problem: Patient Care Overview  Goal: Plan of Care Review  Outcome: Ongoing (interventions implemented as appropriate)  Pt tolerated injection well without complaints. AVS printed and reviewed. Hypercalcemia and Xgeva teaching done. Pt d/c to home with steady gait.

## 2018-02-02 ENCOUNTER — INFUSION (OUTPATIENT)
Dept: INFUSION THERAPY | Facility: HOSPITAL | Age: 63
End: 2018-02-02
Attending: INTERNAL MEDICINE
Payer: COMMERCIAL

## 2018-02-02 DIAGNOSIS — Z85.3 HISTORY OF BREAST CANCER: Primary | ICD-10-CM

## 2018-02-02 PROCEDURE — 63600175 PHARM REV CODE 636 W HCPCS: Mod: TB,PN | Performed by: PHYSICIAN ASSISTANT

## 2018-02-02 PROCEDURE — 96372 THER/PROPH/DIAG INJ SC/IM: CPT | Mod: PN

## 2018-02-02 RX ADMIN — DENOSUMAB 120 MG: 120 INJECTION SUBCUTANEOUS at 01:02

## 2018-02-03 NOTE — PROGRESS NOTES
Date of Service: 02/02/2018  REFERRING PHYSICIAN:  DEA Cates M.D., [facility]    DIAGNOSIS:  T1c N0 M0 (stage IA) infiltrating ductal breast carcinoma, now   recurrent.    PRESENTING PROBLEM AND HISTORY:  Yasmin returns for reevaluation of her breast   carcinoma.  She completed chemotherapy for her first recurrence of her breast   carcinoma of the neck consisted of four cycles of dose-dense AC followed by four   cycles of docetaxel.  She also completed radiation to the breast and   surrounding lymph node basins 56 months ago.  She tolerated the radiation and   got some bumps and burned areas on the skin.  She developed neuropathy in the   feet for which she took Percocet on occasion at bedtime to help her sleep.  She   had been on anastrozole for 18 months and that did not have significant hot   flashes, but she did have a lot of joint stiffness and arthralgias.  She took   Effexor for the occasional hot flash at one time, but did not tolerate that   well.  She has had flare-ups of her irritable bowel syndrome and Dr. Narvaez has   evaluated her with numerous scans and scopes.  She stopped the anastrozole at   one time temporarily because she was worried that the anastrozole caused these   particular symptoms.  I switched her to oral  exemestane 25 mg daily and she   tolerated that better than the anastrozole.  PET CT with image fusion was   repeated on 10/05/2015 and when compared to the previous study of 09/30/2013   that PET CT scan revealed mild skin thickening over the reconstructed right   breast, which was unchanged compared to the prior study.  There were no enlarged   FDG avid lymph nodes in the chest.  There is no evidence of intraabdominal or   intrapelvic metastatic disease.  There was a small sclerotic focus in the   posterior left iliac bone, which had an appearance similar to the previous study   and a CT appearance, which was suggestive of a benign lesion such as an   osteoid, osteoma or bone  island.  At her visit 89 weeks ago, it was noted that   her CEA had climbed up to over 11 and because of that I repeated the CT scans   because of suspicion of recurrent disease.  PET CT was repeated on 03/09/2016.    When compared to the previous study of 10/05/2015, the PET CT scan revealed   focal uptake within a sclerotic lesion within the left ilium, which had   increased to an SUV of 7.9.  There was no evidence of intrathoracic,   intraabdominal or intrapelvic visceral metastatic disease.  Because of focus   revealed hypermetabolic activity, I referred her for core needle biopsy, which   was done on 03/17/2016.  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  At that point, the exemestane   was stopped and she was started on letrozole 2.5 mg p.o. daily in combination   with Ibrance 125 mg p.o. daily, days 1 through 21 with each cycle every 28 days.    She was also getting Xgeva 120 mg subcutaneously every four weeks because of   the bone metastases.  She tolerated the Ibrance well.  She gets tired, but she   has not had nausea, no vomiting.  She has developed no significant cytopenias.    She has stiffness in the joints, which is likely due to the aromatase inhibitor.    PET CT was repeated on 07/05/2016 and when compared to the previous study of   03/09/2016, the PET CT scan then revealed no other areas of hypermetabolic   activity.  I repeated the PET CT imaging on 03/23/2017 and when compared to the   prior study of 07/05/2016, the PET CT scan revealed continued decreased FDG   uptake within the iliac bone lesion and the left femoral neck.  There was no   evidence of new disease.  The most recent PET CT performed on 11/15/2017 when   compared to the prior study of 03/23/2017 revealed resolution of the FDG avidity   within the posterior left iliac bone.  There was further decreased FDG uptake   within the left femoral neck.  There is no evidence of  new metastatic disease.    REVIEW OF SYSTEMS:  Ten systems are reviewed and the pertinent positives and   negatives are elucidated in the history of present illness.    PHYSICAL EXAMINATION:  GENERAL:  Reveals a  female in no apparent distress.  VITAL SIGNS:  Temperature is 97.2, heart rate 84, respiratory rate 16, blood   pressure 166/90, weight is 224 pounds as compared to 214 pounds eight weeks ago.  HEENT:  The oral cavity has no thrush or stomatitis.  NECK:  Supple without palpable cervical or supraclavicular adenopathy.  LUNGS:  Clear.  CARDIAC:  Mechanism is sinus with normal S1 and S2.  There is no gallop or   murmur.  ABDOMEN:  Soft and nontender without organomegaly.  EXTREMITIES:  No edema.    LABORATORY DATA:  CBC reveals a white blood cell count 4.0, absolute neutrophil   count 2.1, hemoglobin 13.7, hematocrit 39.8, platelet count 305,000.  Chemistry:    Sodium 138, potassium 4.1, chloride 100, CO2 of 25, BUN of 19, creatinine 1.2,   calcium 10.0, alkaline phosphatase 60.  AST 19, ALT 31.  CEA is 3.4.  CA 27.29   is 39.3.  CA 15-3 is 36.8.    IMPRESSION:  A 62-year-old postmenopausal  female with a T1c N0 M0   (stage IA) infiltrating ductal carcinoma of the right breast who underwent a   lumpectomy followed by bilateral mastectomies with CHERY flap reconstruction.    She was prescribed anastrozole for which she only took four weeks and stopped   because of arthralgias.  She subsequently developed recurrent disease in the   right axillary lymph nodes.  The original tumor was strongly positive for both   estrogen and progesterone receptors and she completed four cycles of dose-dense   AC followed by four cycles of docetaxel followed by radiation therapy.  She was   then started back on anastrozole 1 mg daily.  DEXA scan done on 06/17/2015   revealed normal bone mineral density.  PET CT was repeated on 10/05/2015 that   revealed nothing suggestive of recurrent disease.  In 2016, her CEA  climbed up   to over 11 and PET CT was repeated on 03/09/2016 revealing sclerotic foci in the   left iliac bone with an SUV of 7.8 concerning for recurrent disease.  Core   needle biopsy was obtained on 03/17/2016 that was consistent with metastatic   breast carcinoma to the bone marrow.  She was started on combination of   letrozole 2.5 mg p.o. daily in combination with Ibrance 125 mg p.o. daily, days   1 through 21 with each cycle 28 days in length.  She was also commenced on Xgeva   120 mg subcutaneously every 28 days.  She has tolerated the Ibrance well.  Her   last PET CT scan done on 11/15/2017 revealed resolution of the FDG uptake in the   left iliac bone lesion and further reduced FDG avidity in the left femoral   neck.    PLAN:  We will continue the Ibrance and letrozole.  She will return for   revaluation in four weeks.      CK  dd: 02/02/2018 13:33:24 (CST)  td: 02/02/2018 20:39:42 (CST)  Doc ID   #[id number]  Job ID #[id number]    CC:

## 2018-03-02 ENCOUNTER — INFUSION (OUTPATIENT)
Dept: INFUSION THERAPY | Facility: HOSPITAL | Age: 63
End: 2018-03-02
Attending: INTERNAL MEDICINE
Payer: COMMERCIAL

## 2018-03-02 VITALS
HEART RATE: 89 BPM | SYSTOLIC BLOOD PRESSURE: 140 MMHG | RESPIRATION RATE: 16 BRPM | TEMPERATURE: 97 F | DIASTOLIC BLOOD PRESSURE: 78 MMHG

## 2018-03-02 DIAGNOSIS — Z85.3 HISTORY OF BREAST CANCER: Primary | ICD-10-CM

## 2018-03-02 PROCEDURE — 63600175 PHARM REV CODE 636 W HCPCS: Mod: TB,PN | Performed by: PHYSICIAN ASSISTANT

## 2018-03-02 PROCEDURE — 96372 THER/PROPH/DIAG INJ SC/IM: CPT | Mod: PN

## 2018-03-02 RX ADMIN — DENOSUMAB 120 MG: 120 INJECTION SUBCUTANEOUS at 02:03

## 2018-04-02 ENCOUNTER — INFUSION (OUTPATIENT)
Dept: INFUSION THERAPY | Facility: HOSPITAL | Age: 63
End: 2018-04-02
Attending: INTERNAL MEDICINE
Payer: COMMERCIAL

## 2018-04-02 VITALS
HEIGHT: 64 IN | SYSTOLIC BLOOD PRESSURE: 150 MMHG | WEIGHT: 227 LBS | HEART RATE: 99 BPM | BODY MASS INDEX: 38.76 KG/M2 | DIASTOLIC BLOOD PRESSURE: 89 MMHG | TEMPERATURE: 98 F | RESPIRATION RATE: 16 BRPM

## 2018-04-02 DIAGNOSIS — Z85.3 HISTORY OF BREAST CANCER: Primary | ICD-10-CM

## 2018-04-02 PROCEDURE — 63600175 PHARM REV CODE 636 W HCPCS: Mod: TB,PN | Performed by: PHYSICIAN ASSISTANT

## 2018-04-02 PROCEDURE — 96372 THER/PROPH/DIAG INJ SC/IM: CPT | Mod: PN

## 2018-04-02 RX ADMIN — DENOSUMAB 120 MG: 120 INJECTION SUBCUTANEOUS at 03:04

## 2018-04-02 NOTE — PLAN OF CARE
Problem: Patient Care Overview  Goal: Plan of Care Review  Outcome: Ongoing (interventions implemented as appropriate)  Pt tolerated well avs given. Pt educated to call Dr with any issues. Pt verbalized understanding.

## 2018-04-02 NOTE — PLAN OF CARE
Problem: Fall Risk (Adult)  Goal: Identify Related Risk Factors and Signs and Symptoms  Related risk factors and signs and symptoms are identified upon initiation of Human Response Clinical Practice Guideline (CPG)   Outcome: Ongoing (interventions implemented as appropriate)  Pt receiving xgeva on shift. No complaints. Educated pt to continue with supplements and to let dentist know on medication before any invasive oral procedures

## 2018-04-25 PROBLEM — Z78.9 STATIN INTOLERANCE: Status: ACTIVE | Noted: 2018-04-25

## 2018-04-25 PROBLEM — I20.0 UNSTABLE ANGINA: Status: RESOLVED | Noted: 2017-05-25 | Resolved: 2018-04-25

## 2018-04-30 ENCOUNTER — INFUSION (OUTPATIENT)
Dept: INFUSION THERAPY | Facility: HOSPITAL | Age: 63
End: 2018-04-30
Attending: INTERNAL MEDICINE
Payer: COMMERCIAL

## 2018-04-30 VITALS
BODY MASS INDEX: 39.09 KG/M2 | OXYGEN SATURATION: 96 % | DIASTOLIC BLOOD PRESSURE: 91 MMHG | SYSTOLIC BLOOD PRESSURE: 139 MMHG | HEIGHT: 64 IN | TEMPERATURE: 97 F | WEIGHT: 229 LBS | RESPIRATION RATE: 16 BRPM | HEART RATE: 100 BPM

## 2018-04-30 DIAGNOSIS — Z85.3 HISTORY OF BREAST CANCER: Primary | ICD-10-CM

## 2018-04-30 PROCEDURE — 96372 THER/PROPH/DIAG INJ SC/IM: CPT | Mod: PN

## 2018-04-30 PROCEDURE — 63600175 PHARM REV CODE 636 W HCPCS: Mod: TB,PN | Performed by: PHYSICIAN ASSISTANT

## 2018-04-30 RX ADMIN — DENOSUMAB 120 MG: 120 INJECTION SUBCUTANEOUS at 02:04

## 2018-05-28 ENCOUNTER — INFUSION (OUTPATIENT)
Dept: INFUSION THERAPY | Facility: HOSPITAL | Age: 63
End: 2018-05-28
Attending: INTERNAL MEDICINE
Payer: COMMERCIAL

## 2018-05-28 VITALS
HEIGHT: 64 IN | DIASTOLIC BLOOD PRESSURE: 90 MMHG | HEART RATE: 89 BPM | RESPIRATION RATE: 16 BRPM | WEIGHT: 223 LBS | TEMPERATURE: 98 F | SYSTOLIC BLOOD PRESSURE: 155 MMHG | BODY MASS INDEX: 38.07 KG/M2

## 2018-05-28 DIAGNOSIS — Z85.3 HISTORY OF BREAST CANCER: Primary | ICD-10-CM

## 2018-05-28 PROCEDURE — 96372 THER/PROPH/DIAG INJ SC/IM: CPT | Mod: PN

## 2018-05-28 PROCEDURE — 63600175 PHARM REV CODE 636 W HCPCS: Mod: TB,PN | Performed by: PHYSICIAN ASSISTANT

## 2018-05-28 RX ADMIN — DENOSUMAB 120 MG: 120 INJECTION SUBCUTANEOUS at 01:05

## 2018-06-29 ENCOUNTER — INFUSION (OUTPATIENT)
Dept: INFUSION THERAPY | Facility: HOSPITAL | Age: 63
End: 2018-06-29
Attending: INTERNAL MEDICINE
Payer: COMMERCIAL

## 2018-06-29 VITALS
RESPIRATION RATE: 16 BRPM | HEIGHT: 64 IN | BODY MASS INDEX: 38.41 KG/M2 | DIASTOLIC BLOOD PRESSURE: 99 MMHG | SYSTOLIC BLOOD PRESSURE: 162 MMHG | WEIGHT: 225 LBS | TEMPERATURE: 97 F | HEART RATE: 87 BPM

## 2018-06-29 DIAGNOSIS — Z85.3 HISTORY OF BREAST CANCER: Primary | ICD-10-CM

## 2018-06-29 PROCEDURE — 96372 THER/PROPH/DIAG INJ SC/IM: CPT | Mod: PN

## 2018-06-29 PROCEDURE — 63600175 PHARM REV CODE 636 W HCPCS: Mod: TB,PN | Performed by: PHYSICIAN ASSISTANT

## 2018-06-29 RX ADMIN — DENOSUMAB 120 MG: 120 INJECTION SUBCUTANEOUS at 02:06

## 2018-08-02 ENCOUNTER — INFUSION (OUTPATIENT)
Dept: INFUSION THERAPY | Facility: HOSPITAL | Age: 63
End: 2018-08-02
Attending: INTERNAL MEDICINE
Payer: COMMERCIAL

## 2018-08-02 VITALS
TEMPERATURE: 98 F | DIASTOLIC BLOOD PRESSURE: 93 MMHG | SYSTOLIC BLOOD PRESSURE: 147 MMHG | BODY MASS INDEX: 37.7 KG/M2 | HEART RATE: 97 BPM | RESPIRATION RATE: 16 BRPM | WEIGHT: 220.81 LBS | OXYGEN SATURATION: 99 % | HEIGHT: 64 IN

## 2018-08-02 DIAGNOSIS — Z85.3 HISTORY OF BREAST CANCER: Primary | ICD-10-CM

## 2018-08-02 PROCEDURE — 63600175 PHARM REV CODE 636 W HCPCS: Mod: TB,PN | Performed by: PHYSICIAN ASSISTANT

## 2018-08-02 PROCEDURE — 96372 THER/PROPH/DIAG INJ SC/IM: CPT | Mod: PN

## 2018-08-02 RX ADMIN — DENOSUMAB 120 MG: 120 INJECTION SUBCUTANEOUS at 02:08

## 2018-08-02 NOTE — PATIENT INSTRUCTIONS
Denosumab injection  What is this medicine?  DENOSUMAB (den oh naz mab) slows bone breakdown. Prolia is used to treat osteoporosis in women after menopause and in men. Xgeva is used to prevent bone fractures and other bone problems caused by cancer bone metastases. Xgeva is also used to treat giant cell tumor of the bone.  How should I use this medicine?  This medicine is for injection under the skin. It is given by a health care professional in a hospital or clinic setting.  If you are getting Prolia, a special MedGuide will be given to you by the pharmacist with each prescription and refill. Be sure to read this information carefully each time.  For Prolia, talk to your pediatrician regarding the use of this medicine in children. Special care may be needed. For Xgeva, talk to your pediatrician regarding the use of this medicine in children. While this drug may be prescribed for children as young as 13 years for selected conditions, precautions do apply.  What side effects may I notice from receiving this medicine?  Side effects that you should report to your doctor or health care professional as soon as possible:  · allergic reactions like skin rash, itching or hives, swelling of the face, lips, or tongue  · breathing problems  · chest pain  · fast, irregular heartbeat  · feeling faint or lightheaded, falls  · fever, chills, or any other sign of infection  · muscle spasms, tightening, or twitches  · numbness or tingling  · skin blisters or bumps, or is dry, peels, or red  · slow healing or unexplained pain in the mouth or jaw  · unusual bleeding or bruising  Side effects that usually do not require medical attention (Report these to your doctor or health care professional if they continue or are bothersome.):  · muscle pain  · stomach upset, gas  What may interact with this medicine?  Do not take this medicine with any of the following medications:  · other medicines containing denosumab  This medicine may also  interact with the following medications:  · medicines that suppress the immune system  · medicines that treat cancer  · steroid medicines like prednisone or cortisone  What if I miss a dose?  It is important not to miss your dose. Call your doctor or health care professional if you are unable to keep an appointment.  Where should I keep my medicine?  This medicine is only given in a clinic, doctor's office, or other health care setting and will not be stored at home.  What should I tell my health care provider before I take this medicine?  They need to know if you have any of these conditions:  · dental disease  · eczema  · infection or history of infections  · kidney disease or on dialysis  · low blood calcium or vitamin D  · malabsorption syndrome  · scheduled to have surgery or tooth extraction  · taking medicine that contains denosumab  · thyroid or parathyroid disease  · an unusual reaction to denosumab, other medicines, foods, dyes, or preservatives  · pregnant or trying to get pregnant  · breast-feeding  What should I watch for while using this medicine?  Visit your doctor or health care professional for regular checks on your progress. Your doctor or health care professional may order blood tests and other tests to see how you are doing.  Call your doctor or health care professional if you get a cold or other infection while receiving this medicine. Do not treat yourself. This medicine may decrease your body's ability to fight infection.  You should make sure you get enough calcium and vitamin D while you are taking this medicine, unless your doctor tells you not to. Discuss the foods you eat and the vitamins you take with your health care professional.  See your dentist regularly. Brush and floss your teeth as directed. Before you have any dental work done, tell your dentist you are receiving this medicine.  Do not become pregnant while taking this medicine or for 5 months after stopping it. Women should  inform their doctor if they wish to become pregnant or think they might be pregnant. There is a potential for serious side effects to an unborn child. Talk to your health care professional or pharmacist for more information.  NOTE:This sheet is a summary. It may not cover all possible information. If you have questions about this medicine, talk to your doctor, pharmacist, or health care provider. Copyright© 2017 [employer]        Preventing Falls: Are You At Risk of Falling?     Ask for help to reduce risk of falling in your home.     As you get older, you're not as steady on your feet as you once were. And you may have health problems you didn't have when you were younger. So, it's not surprising that older people are more likely to trip and fall. Falling can be very serious. It can change your overall health and quality of life. That's why it's important to be aware of your own risk of falling.  The dangers of falling  Falls are one of the main causes of injury in people over age 65. An older person who falls may take longer to get better than a younger person. And, after a fall, an older person is more likely to have problems that don't go away. So, preventing falls can help you avoid serious health problems.  Are you at risk of falling?  Answer these questions to rate your level of risk.  · Are you a woman?  · Have you fallen or stumbled in the last year?  · Are you over age 65?  · Are you ever dizzy or lightheaded with standing?  · Do you have a hard time getting in and out of the bathtub or on and off the toilet?  · Do you lean on objects to help you get around? Or do you use a cane or walker?  · Do you have vision or hearing problems? For example, do you need new glasses or hearing aids?  · Do you have 2 or more long-lasting (chronic) medical conditions?  · Do you take 3 or more medicines?  · Have you felt depressed recently?  · Have you had more trouble with your memory in recent months?  · Are there  "hazards in your home that might cause you to fall, such as loose rugs or poor lighting?  · Do you have a pet that jumps on you or might trip you?  · Have you stopped getting regular exercise?  · Do you have diabetes?   · Do you have a neurologic disease, such as Parkinson or Alzheimer disease?   · Do you drink alcohol?  · Do you wear athletic shoes or slippers, or go barefoot at home?  You can help prevent falls  If you answered "yes" to any of the above questions, you should take steps to reduce your risk of a fall. Monitoring health conditions and keeping walkways in your home free of clutter are just 2 ways. Changing is sometimes easier said than done. But keep in mind that even small changes can make you less likely to fall.  The fear of falling  It's normal to be scared of falling, especially if you've fallen before. But being afraid can actually make you more likely to fall. This is because:  · Fear might cause you to become less active. Being less active can lead to a loss of strength and balance.  · Fear can lead to isolation from others, depression, or the use of more medicines or alcohol. And all these things make falling even more likely.  To break the cycle, learn more about ways to avoid falling. As you take control, you may find yourself feeling less afraid.   Date Last Reviewed: 6/12/2015  © 3982-4337 [employer]. [address] [place] [address], [place] [address]. All rights reserved. This information is not intended as a substitute for professional medical care. Always follow your healthcare professional's instructions.        "

## 2018-08-30 ENCOUNTER — INFUSION (OUTPATIENT)
Dept: INFUSION THERAPY | Facility: HOSPITAL | Age: 63
End: 2018-08-30
Attending: INTERNAL MEDICINE
Payer: COMMERCIAL

## 2018-08-30 DIAGNOSIS — Z85.3 HISTORY OF BREAST CANCER: Primary | ICD-10-CM

## 2018-08-30 PROCEDURE — 96372 THER/PROPH/DIAG INJ SC/IM: CPT | Mod: PN

## 2018-08-30 PROCEDURE — 63600175 PHARM REV CODE 636 W HCPCS: Mod: TB,PN | Performed by: PHYSICIAN ASSISTANT

## 2018-08-30 RX ADMIN — DENOSUMAB 120 MG: 120 INJECTION SUBCUTANEOUS at 12:08

## 2018-10-03 ENCOUNTER — INFUSION (OUTPATIENT)
Dept: INFUSION THERAPY | Facility: HOSPITAL | Age: 63
End: 2018-10-03
Attending: INTERNAL MEDICINE
Payer: COMMERCIAL

## 2018-10-03 DIAGNOSIS — Z85.3 HISTORY OF BREAST CANCER: Primary | ICD-10-CM

## 2018-10-03 PROCEDURE — 63600175 PHARM REV CODE 636 W HCPCS: Mod: TB,PN | Performed by: INTERNAL MEDICINE

## 2018-10-03 PROCEDURE — 96372 THER/PROPH/DIAG INJ SC/IM: CPT | Mod: PN

## 2018-10-03 RX ADMIN — DENOSUMAB 120 MG: 120 INJECTION SUBCUTANEOUS at 12:10

## 2018-10-31 ENCOUNTER — INFUSION (OUTPATIENT)
Dept: INFUSION THERAPY | Facility: HOSPITAL | Age: 63
End: 2018-10-31
Attending: INTERNAL MEDICINE
Payer: COMMERCIAL

## 2018-10-31 DIAGNOSIS — Z85.3 HISTORY OF BREAST CANCER: Primary | ICD-10-CM

## 2018-10-31 PROCEDURE — 63600175 PHARM REV CODE 636 W HCPCS: Mod: TB,PN | Performed by: NURSE PRACTITIONER

## 2018-10-31 PROCEDURE — 96372 THER/PROPH/DIAG INJ SC/IM: CPT | Mod: PN

## 2018-10-31 RX ADMIN — DENOSUMAB 120 MG: 120 INJECTION SUBCUTANEOUS at 12:10

## 2018-11-28 ENCOUNTER — INFUSION (OUTPATIENT)
Dept: INFUSION THERAPY | Facility: HOSPITAL | Age: 63
End: 2018-11-28
Attending: INTERNAL MEDICINE
Payer: COMMERCIAL

## 2018-11-28 DIAGNOSIS — Z85.3 HISTORY OF BREAST CANCER: Primary | ICD-10-CM

## 2018-11-28 PROCEDURE — 63600175 PHARM REV CODE 636 W HCPCS: Mod: TB,PN | Performed by: NURSE PRACTITIONER

## 2018-11-28 PROCEDURE — 96372 THER/PROPH/DIAG INJ SC/IM: CPT | Mod: PN

## 2018-11-28 RX ADMIN — DENOSUMAB 120 MG: 120 INJECTION SUBCUTANEOUS at 01:11

## 2018-12-28 ENCOUNTER — INFUSION (OUTPATIENT)
Dept: INFUSION THERAPY | Facility: HOSPITAL | Age: 63
End: 2018-12-28
Attending: INTERNAL MEDICINE
Payer: COMMERCIAL

## 2018-12-28 DIAGNOSIS — Z85.3 HISTORY OF BREAST CANCER: Primary | ICD-10-CM

## 2018-12-28 PROCEDURE — 96372 THER/PROPH/DIAG INJ SC/IM: CPT | Mod: PN

## 2018-12-28 PROCEDURE — 63600175 PHARM REV CODE 636 W HCPCS: Mod: TB,PN | Performed by: NURSE PRACTITIONER

## 2018-12-28 RX ADMIN — DENOSUMAB 120 MG: 120 INJECTION SUBCUTANEOUS at 02:12

## 2019-01-25 ENCOUNTER — INFUSION (OUTPATIENT)
Dept: INFUSION THERAPY | Facility: HOSPITAL | Age: 64
End: 2019-01-25
Attending: INTERNAL MEDICINE
Payer: COMMERCIAL

## 2019-01-25 VITALS
RESPIRATION RATE: 18 BRPM | WEIGHT: 213 LBS | SYSTOLIC BLOOD PRESSURE: 109 MMHG | HEART RATE: 98 BPM | HEIGHT: 64 IN | BODY MASS INDEX: 36.37 KG/M2 | DIASTOLIC BLOOD PRESSURE: 74 MMHG

## 2019-01-25 DIAGNOSIS — Z85.3 HISTORY OF BREAST CANCER: Primary | ICD-10-CM

## 2019-01-25 PROCEDURE — 63600175 PHARM REV CODE 636 W HCPCS: Mod: JG,PN | Performed by: NURSE PRACTITIONER

## 2019-01-25 PROCEDURE — 96372 THER/PROPH/DIAG INJ SC/IM: CPT | Mod: PN

## 2019-01-25 RX ADMIN — DENOSUMAB 120 MG: 120 INJECTION SUBCUTANEOUS at 02:01

## 2019-02-22 ENCOUNTER — INFUSION (OUTPATIENT)
Dept: INFUSION THERAPY | Facility: HOSPITAL | Age: 64
End: 2019-02-22
Attending: INTERNAL MEDICINE
Payer: COMMERCIAL

## 2019-02-22 DIAGNOSIS — Z85.3 HISTORY OF BREAST CANCER: Primary | ICD-10-CM

## 2019-02-22 PROCEDURE — 96372 THER/PROPH/DIAG INJ SC/IM: CPT | Mod: PN

## 2019-02-22 PROCEDURE — 63600175 PHARM REV CODE 636 W HCPCS: Mod: JG,PN | Performed by: NURSE PRACTITIONER

## 2019-02-22 RX ADMIN — DENOSUMAB 120 MG: 120 INJECTION SUBCUTANEOUS at 03:02

## 2019-03-22 ENCOUNTER — INFUSION (OUTPATIENT)
Dept: INFUSION THERAPY | Facility: HOSPITAL | Age: 64
End: 2019-03-22
Attending: INTERNAL MEDICINE
Payer: COMMERCIAL

## 2019-03-22 VITALS
DIASTOLIC BLOOD PRESSURE: 93 MMHG | RESPIRATION RATE: 18 BRPM | TEMPERATURE: 98 F | HEART RATE: 91 BPM | SYSTOLIC BLOOD PRESSURE: 175 MMHG

## 2019-03-22 DIAGNOSIS — Z85.3 HISTORY OF BREAST CANCER: Primary | ICD-10-CM

## 2019-03-22 PROCEDURE — 96372 THER/PROPH/DIAG INJ SC/IM: CPT | Mod: PN

## 2019-03-22 PROCEDURE — 63600175 PHARM REV CODE 636 W HCPCS: Mod: JG,PN | Performed by: NURSE PRACTITIONER

## 2019-03-22 RX ADMIN — DENOSUMAB 120 MG: 120 INJECTION SUBCUTANEOUS at 11:03

## 2019-04-22 ENCOUNTER — INFUSION (OUTPATIENT)
Dept: INFUSION THERAPY | Facility: HOSPITAL | Age: 64
End: 2019-04-22
Attending: INTERNAL MEDICINE
Payer: COMMERCIAL

## 2019-04-22 VITALS
TEMPERATURE: 98 F | DIASTOLIC BLOOD PRESSURE: 82 MMHG | SYSTOLIC BLOOD PRESSURE: 151 MMHG | HEART RATE: 85 BPM | RESPIRATION RATE: 18 BRPM

## 2019-04-22 DIAGNOSIS — Z85.3 HISTORY OF BREAST CANCER: Primary | ICD-10-CM

## 2019-04-22 PROCEDURE — 63600175 PHARM REV CODE 636 W HCPCS: Mod: JG,PN | Performed by: NURSE PRACTITIONER

## 2019-04-22 PROCEDURE — 96372 THER/PROPH/DIAG INJ SC/IM: CPT | Mod: PN

## 2019-04-22 RX ADMIN — DENOSUMAB 120 MG: 120 INJECTION SUBCUTANEOUS at 02:04

## 2019-05-20 ENCOUNTER — INFUSION (OUTPATIENT)
Dept: INFUSION THERAPY | Facility: HOSPITAL | Age: 64
End: 2019-05-20
Attending: INTERNAL MEDICINE
Payer: COMMERCIAL

## 2019-05-20 VITALS
HEART RATE: 103 BPM | RESPIRATION RATE: 18 BRPM | DIASTOLIC BLOOD PRESSURE: 86 MMHG | SYSTOLIC BLOOD PRESSURE: 154 MMHG | TEMPERATURE: 98 F

## 2019-05-20 DIAGNOSIS — Z85.3 HISTORY OF BREAST CANCER: Primary | ICD-10-CM

## 2019-05-20 PROCEDURE — 63600175 PHARM REV CODE 636 W HCPCS: Mod: JG,PN | Performed by: NURSE PRACTITIONER

## 2019-05-20 PROCEDURE — 96372 THER/PROPH/DIAG INJ SC/IM: CPT | Mod: PN

## 2019-05-20 RX ADMIN — DENOSUMAB 120 MG: 120 INJECTION SUBCUTANEOUS at 03:05

## 2019-06-17 ENCOUNTER — INFUSION (OUTPATIENT)
Dept: INFUSION THERAPY | Facility: HOSPITAL | Age: 64
End: 2019-06-17
Attending: INTERNAL MEDICINE
Payer: COMMERCIAL

## 2019-06-17 VITALS
SYSTOLIC BLOOD PRESSURE: 134 MMHG | DIASTOLIC BLOOD PRESSURE: 81 MMHG | RESPIRATION RATE: 18 BRPM | TEMPERATURE: 98 F | HEART RATE: 93 BPM

## 2019-06-17 DIAGNOSIS — Z85.3 HISTORY OF BREAST CANCER: Primary | ICD-10-CM

## 2019-06-17 PROCEDURE — 63600175 PHARM REV CODE 636 W HCPCS: Mod: JG,PN | Performed by: NURSE PRACTITIONER

## 2019-06-17 PROCEDURE — 96372 THER/PROPH/DIAG INJ SC/IM: CPT | Mod: PN

## 2019-06-17 RX ADMIN — DENOSUMAB 120 MG: 120 INJECTION SUBCUTANEOUS at 02:06

## 2019-07-15 ENCOUNTER — INFUSION (OUTPATIENT)
Dept: INFUSION THERAPY | Facility: HOSPITAL | Age: 64
End: 2019-07-15
Attending: INTERNAL MEDICINE
Payer: COMMERCIAL

## 2019-07-15 VITALS
RESPIRATION RATE: 18 BRPM | DIASTOLIC BLOOD PRESSURE: 75 MMHG | TEMPERATURE: 98 F | HEART RATE: 96 BPM | SYSTOLIC BLOOD PRESSURE: 132 MMHG

## 2019-07-15 DIAGNOSIS — Z85.3 HISTORY OF BREAST CANCER: Primary | ICD-10-CM

## 2019-07-15 PROCEDURE — 63600175 PHARM REV CODE 636 W HCPCS: Mod: JG,PN | Performed by: NURSE PRACTITIONER

## 2019-07-15 PROCEDURE — 96372 THER/PROPH/DIAG INJ SC/IM: CPT | Mod: PN

## 2019-07-15 RX ADMIN — DENOSUMAB 120 MG: 120 INJECTION SUBCUTANEOUS at 02:07

## 2019-07-30 PROBLEM — N17.9 AKI (ACUTE KIDNEY INJURY): Status: RESOLVED | Noted: 2017-05-24 | Resolved: 2019-07-30

## 2019-08-12 ENCOUNTER — INFUSION (OUTPATIENT)
Dept: INFUSION THERAPY | Facility: HOSPITAL | Age: 64
End: 2019-08-12
Attending: INTERNAL MEDICINE
Payer: COMMERCIAL

## 2019-08-12 ENCOUNTER — LAB VISIT (OUTPATIENT)
Dept: LAB | Facility: HOSPITAL | Age: 64
End: 2019-08-12
Attending: INTERNAL MEDICINE
Payer: COMMERCIAL

## 2019-08-12 VITALS
DIASTOLIC BLOOD PRESSURE: 79 MMHG | TEMPERATURE: 98 F | SYSTOLIC BLOOD PRESSURE: 154 MMHG | HEART RATE: 100 BPM | RESPIRATION RATE: 18 BRPM

## 2019-08-12 DIAGNOSIS — C50.811 MALIGNANT NEOPLASM OF OVERLAPPING SITES OF RIGHT FEMALE BREAST, UNSPECIFIED ESTROGEN RECEPTOR STATUS: ICD-10-CM

## 2019-08-12 DIAGNOSIS — Z85.3 HISTORY OF BREAST CANCER: Primary | ICD-10-CM

## 2019-08-12 LAB
ALBUMIN SERPL BCP-MCNC: 4 G/DL (ref 3.5–5.2)
ALP SERPL-CCNC: 59 U/L (ref 38–145)
ALT SERPL W/O P-5'-P-CCNC: 28 U/L (ref 10–44)
ANION GAP SERPL CALC-SCNC: 10 MMOL/L (ref 8–16)
AST SERPL-CCNC: 24 U/L (ref 14–36)
BASOPHILS # BLD AUTO: 0.09 K/UL (ref 0–0.2)
BASOPHILS NFR BLD: 2.2 % (ref 0–1.9)
BILIRUB SERPL-MCNC: 0.4 MG/DL (ref 0.2–1.3)
BUN SERPL-MCNC: 15 MG/DL (ref 7–18)
CALCIUM SERPL-MCNC: 10.1 MG/DL (ref 8.4–10.2)
CHLORIDE SERPL-SCNC: 103 MMOL/L (ref 95–110)
CO2 SERPL-SCNC: 26 MMOL/L (ref 22–31)
CREAT SERPL-MCNC: 1.36 MG/DL (ref 0.5–1.4)
DIFFERENTIAL METHOD: ABNORMAL
EOSINOPHIL # BLD AUTO: 0.1 K/UL (ref 0–0.5)
EOSINOPHIL NFR BLD: 1.7 % (ref 0–8)
ERYTHROCYTE [DISTWIDTH] IN BLOOD BY AUTOMATED COUNT: 13.7 % (ref 11.5–14.5)
EST. GFR  (AFRICAN AMERICAN): 48 ML/MIN/1.73 M^2
EST. GFR  (NON AFRICAN AMERICAN): 42 ML/MIN/1.73 M^2
GLUCOSE SERPL-MCNC: 194 MG/DL (ref 70–110)
HCT VFR BLD AUTO: 37.5 % (ref 37–48.5)
HGB BLD-MCNC: 13.2 G/DL (ref 12–16)
IMM GRANULOCYTES # BLD AUTO: 0.02 K/UL (ref 0–0.04)
IMM GRANULOCYTES NFR BLD AUTO: 0.5 % (ref 0–0.5)
LYMPHOCYTES # BLD AUTO: 1.2 K/UL (ref 1–4.8)
LYMPHOCYTES NFR BLD: 29.5 % (ref 18–48)
MCH RBC QN AUTO: 36.9 PG (ref 27–31)
MCHC RBC AUTO-ENTMCNC: 35.2 G/DL (ref 32–36)
MCV RBC AUTO: 105 FL (ref 82–98)
MONOCYTES # BLD AUTO: 0.3 K/UL (ref 0.3–1)
MONOCYTES NFR BLD: 7 % (ref 4–15)
NEUTROPHILS # BLD AUTO: 2.4 K/UL (ref 1.8–7.7)
NEUTROPHILS NFR BLD: 59.1 % (ref 38–73)
NRBC BLD-RTO: 0 /100 WBC
PLATELET # BLD AUTO: 372 K/UL (ref 150–350)
PLATELET BLD QL SMEAR: ABNORMAL
PMV BLD AUTO: 9.3 FL (ref 9.2–12.9)
POTASSIUM SERPL-SCNC: 4.3 MMOL/L (ref 3.5–5.1)
PROT SERPL-MCNC: 7.1 G/DL (ref 6–8.4)
RBC # BLD AUTO: 3.58 M/UL (ref 4–5.4)
SODIUM SERPL-SCNC: 139 MMOL/L (ref 136–145)
WBC # BLD AUTO: 4.14 K/UL (ref 3.9–12.7)

## 2019-08-12 PROCEDURE — 36415 COLL VENOUS BLD VENIPUNCTURE: CPT | Mod: PN

## 2019-08-12 PROCEDURE — 85025 COMPLETE CBC W/AUTO DIFF WBC: CPT | Mod: PN

## 2019-08-12 PROCEDURE — 85025 COMPLETE CBC W/AUTO DIFF WBC: CPT

## 2019-08-12 PROCEDURE — 80053 COMPREHEN METABOLIC PANEL: CPT

## 2019-08-12 PROCEDURE — 63600175 PHARM REV CODE 636 W HCPCS: Mod: JG,PN | Performed by: NURSE PRACTITIONER

## 2019-08-12 PROCEDURE — 96372 THER/PROPH/DIAG INJ SC/IM: CPT | Mod: PN

## 2019-08-12 PROCEDURE — 80053 COMPREHEN METABOLIC PANEL: CPT | Mod: PN

## 2019-08-12 RX ADMIN — DENOSUMAB 120 MG: 120 INJECTION SUBCUTANEOUS at 04:08

## 2019-09-09 ENCOUNTER — INFUSION (OUTPATIENT)
Dept: INFUSION THERAPY | Facility: HOSPITAL | Age: 64
End: 2019-09-09
Attending: INTERNAL MEDICINE
Payer: COMMERCIAL

## 2019-09-09 VITALS
RESPIRATION RATE: 18 BRPM | SYSTOLIC BLOOD PRESSURE: 153 MMHG | HEART RATE: 69 BPM | DIASTOLIC BLOOD PRESSURE: 80 MMHG | TEMPERATURE: 98 F

## 2019-09-09 DIAGNOSIS — Z85.3 HISTORY OF BREAST CANCER: Primary | ICD-10-CM

## 2019-09-09 PROCEDURE — 63600175 PHARM REV CODE 636 W HCPCS: Mod: JG,PN | Performed by: NURSE PRACTITIONER

## 2019-09-09 PROCEDURE — 96372 THER/PROPH/DIAG INJ SC/IM: CPT | Mod: PN

## 2019-09-09 RX ADMIN — DENOSUMAB 120 MG: 120 INJECTION SUBCUTANEOUS at 02:09

## 2019-10-07 ENCOUNTER — INFUSION (OUTPATIENT)
Dept: INFUSION THERAPY | Facility: HOSPITAL | Age: 64
End: 2019-10-07
Attending: INTERNAL MEDICINE
Payer: COMMERCIAL

## 2019-10-07 VITALS
TEMPERATURE: 98 F | SYSTOLIC BLOOD PRESSURE: 129 MMHG | HEART RATE: 87 BPM | DIASTOLIC BLOOD PRESSURE: 72 MMHG | RESPIRATION RATE: 18 BRPM

## 2019-10-07 DIAGNOSIS — Z85.3 HISTORY OF BREAST CANCER: Primary | ICD-10-CM

## 2019-10-07 PROCEDURE — 63600175 PHARM REV CODE 636 W HCPCS: Mod: JG,PN | Performed by: NURSE PRACTITIONER

## 2019-10-07 PROCEDURE — 96372 THER/PROPH/DIAG INJ SC/IM: CPT | Mod: PN

## 2019-10-07 RX ADMIN — DENOSUMAB 120 MG: 120 INJECTION SUBCUTANEOUS at 03:10

## 2019-11-05 ENCOUNTER — INFUSION (OUTPATIENT)
Dept: INFUSION THERAPY | Facility: HOSPITAL | Age: 64
End: 2019-11-05
Attending: INTERNAL MEDICINE
Payer: COMMERCIAL

## 2019-11-05 VITALS
HEART RATE: 98 BPM | TEMPERATURE: 98 F | DIASTOLIC BLOOD PRESSURE: 70 MMHG | RESPIRATION RATE: 20 BRPM | SYSTOLIC BLOOD PRESSURE: 144 MMHG

## 2019-11-05 DIAGNOSIS — Z85.3 HISTORY OF BREAST CANCER: Primary | ICD-10-CM

## 2019-11-05 PROCEDURE — 63600175 PHARM REV CODE 636 W HCPCS: Mod: JG,PN | Performed by: INTERNAL MEDICINE

## 2019-11-05 PROCEDURE — 96372 THER/PROPH/DIAG INJ SC/IM: CPT | Mod: PN

## 2019-11-05 RX ADMIN — DENOSUMAB 120 MG: 120 INJECTION SUBCUTANEOUS at 01:11

## 2019-12-04 ENCOUNTER — INFUSION (OUTPATIENT)
Dept: INFUSION THERAPY | Facility: HOSPITAL | Age: 64
End: 2019-12-04
Attending: INTERNAL MEDICINE
Payer: COMMERCIAL

## 2019-12-04 VITALS
TEMPERATURE: 98 F | HEART RATE: 90 BPM | DIASTOLIC BLOOD PRESSURE: 77 MMHG | RESPIRATION RATE: 18 BRPM | SYSTOLIC BLOOD PRESSURE: 124 MMHG

## 2019-12-04 DIAGNOSIS — Z85.3 HISTORY OF BREAST CANCER: Primary | ICD-10-CM

## 2019-12-04 PROCEDURE — 63600175 PHARM REV CODE 636 W HCPCS: Mod: JG,PN | Performed by: INTERNAL MEDICINE

## 2019-12-04 PROCEDURE — 96372 THER/PROPH/DIAG INJ SC/IM: CPT | Mod: PN

## 2019-12-04 RX ADMIN — DENOSUMAB 120 MG: 120 INJECTION SUBCUTANEOUS at 01:12

## 2020-01-03 ENCOUNTER — INFUSION (OUTPATIENT)
Dept: INFUSION THERAPY | Facility: HOSPITAL | Age: 65
End: 2020-01-03
Attending: INTERNAL MEDICINE
Payer: COMMERCIAL

## 2020-01-03 VITALS
SYSTOLIC BLOOD PRESSURE: 133 MMHG | RESPIRATION RATE: 18 BRPM | HEART RATE: 67 BPM | TEMPERATURE: 98 F | DIASTOLIC BLOOD PRESSURE: 67 MMHG

## 2020-01-03 DIAGNOSIS — Z85.3 HISTORY OF BREAST CANCER: Primary | ICD-10-CM

## 2020-01-03 PROCEDURE — 96372 THER/PROPH/DIAG INJ SC/IM: CPT | Mod: PN

## 2020-01-03 PROCEDURE — 63600175 PHARM REV CODE 636 W HCPCS: Mod: JG,PN | Performed by: INTERNAL MEDICINE

## 2020-01-03 RX ADMIN — DENOSUMAB 120 MG: 120 INJECTION SUBCUTANEOUS at 11:01

## 2020-01-31 ENCOUNTER — INFUSION (OUTPATIENT)
Dept: INFUSION THERAPY | Facility: HOSPITAL | Age: 65
End: 2020-01-31
Attending: INTERNAL MEDICINE
Payer: COMMERCIAL

## 2020-01-31 DIAGNOSIS — Z85.3 HISTORY OF BREAST CANCER: Primary | ICD-10-CM

## 2020-01-31 PROCEDURE — 96372 THER/PROPH/DIAG INJ SC/IM: CPT | Mod: PN

## 2020-01-31 PROCEDURE — 63600175 PHARM REV CODE 636 W HCPCS: Mod: JG,PN | Performed by: INTERNAL MEDICINE

## 2020-01-31 RX ADMIN — DENOSUMAB 120 MG: 120 INJECTION SUBCUTANEOUS at 03:01

## 2020-02-28 ENCOUNTER — INFUSION (OUTPATIENT)
Dept: INFUSION THERAPY | Facility: HOSPITAL | Age: 65
End: 2020-02-28
Attending: INTERNAL MEDICINE
Payer: COMMERCIAL

## 2020-02-28 DIAGNOSIS — Z85.3 HISTORY OF BREAST CANCER: Primary | ICD-10-CM

## 2020-02-28 PROCEDURE — 63600175 PHARM REV CODE 636 W HCPCS: Mod: JG,PN | Performed by: INTERNAL MEDICINE

## 2020-02-28 PROCEDURE — 96372 THER/PROPH/DIAG INJ SC/IM: CPT | Mod: PN

## 2020-02-28 RX ADMIN — DENOSUMAB 120 MG: 120 INJECTION SUBCUTANEOUS at 01:02

## 2020-03-27 ENCOUNTER — INFUSION (OUTPATIENT)
Dept: INFUSION THERAPY | Facility: HOSPITAL | Age: 65
End: 2020-03-27
Attending: INTERNAL MEDICINE
Payer: COMMERCIAL

## 2020-03-27 DIAGNOSIS — Z85.3 HISTORY OF BREAST CANCER: Primary | ICD-10-CM

## 2020-03-27 PROCEDURE — 63600175 PHARM REV CODE 636 W HCPCS: Mod: JG,PN | Performed by: INTERNAL MEDICINE

## 2020-03-27 PROCEDURE — 96372 THER/PROPH/DIAG INJ SC/IM: CPT | Mod: PN

## 2020-03-27 RX ADMIN — DENOSUMAB 120 MG: 120 INJECTION SUBCUTANEOUS at 02:03

## 2020-04-24 DIAGNOSIS — Z03.818 ENCNTR FOR OBS FOR SUSP EXPSR TO OTH BIOLG AGENTS RULED OUT: Primary | ICD-10-CM

## 2020-04-30 ENCOUNTER — INFUSION (OUTPATIENT)
Dept: INFUSION THERAPY | Facility: HOSPITAL | Age: 65
End: 2020-04-30
Attending: INTERNAL MEDICINE
Payer: COMMERCIAL

## 2020-04-30 VITALS
HEART RATE: 78 BPM | RESPIRATION RATE: 20 BRPM | SYSTOLIC BLOOD PRESSURE: 134 MMHG | DIASTOLIC BLOOD PRESSURE: 67 MMHG | TEMPERATURE: 98 F

## 2020-04-30 DIAGNOSIS — Z85.3 HISTORY OF BREAST CANCER: Primary | ICD-10-CM

## 2020-04-30 PROCEDURE — 96372 THER/PROPH/DIAG INJ SC/IM: CPT | Mod: PN

## 2020-04-30 PROCEDURE — 63600175 PHARM REV CODE 636 W HCPCS: Mod: JG,PN | Performed by: INTERNAL MEDICINE

## 2020-04-30 RX ADMIN — DENOSUMAB 120 MG: 120 INJECTION SUBCUTANEOUS at 02:04

## 2020-05-28 ENCOUNTER — INFUSION (OUTPATIENT)
Dept: INFUSION THERAPY | Facility: HOSPITAL | Age: 65
End: 2020-05-28
Attending: INTERNAL MEDICINE
Payer: COMMERCIAL

## 2020-05-28 DIAGNOSIS — Z85.3 HISTORY OF BREAST CANCER: Primary | ICD-10-CM

## 2020-05-28 PROCEDURE — 63600175 PHARM REV CODE 636 W HCPCS: Mod: JG,PN | Performed by: INTERNAL MEDICINE

## 2020-05-28 PROCEDURE — 96372 THER/PROPH/DIAG INJ SC/IM: CPT | Mod: PN

## 2020-05-28 RX ADMIN — DENOSUMAB 120 MG: 120 INJECTION SUBCUTANEOUS at 02:05

## 2020-06-25 ENCOUNTER — INFUSION (OUTPATIENT)
Dept: INFUSION THERAPY | Facility: HOSPITAL | Age: 65
End: 2020-06-25
Attending: INTERNAL MEDICINE
Payer: COMMERCIAL

## 2020-06-25 VITALS
HEART RATE: 97 BPM | TEMPERATURE: 98 F | DIASTOLIC BLOOD PRESSURE: 70 MMHG | SYSTOLIC BLOOD PRESSURE: 124 MMHG | RESPIRATION RATE: 20 BRPM

## 2020-06-25 DIAGNOSIS — Z85.3 HISTORY OF BREAST CANCER: Primary | ICD-10-CM

## 2020-06-25 PROCEDURE — 63600175 PHARM REV CODE 636 W HCPCS: Mod: JG,PN | Performed by: INTERNAL MEDICINE

## 2020-06-25 PROCEDURE — 96372 THER/PROPH/DIAG INJ SC/IM: CPT | Mod: PN

## 2020-06-25 RX ADMIN — DENOSUMAB 120 MG: 120 INJECTION SUBCUTANEOUS at 12:06

## 2020-07-23 ENCOUNTER — INFUSION (OUTPATIENT)
Dept: INFUSION THERAPY | Facility: HOSPITAL | Age: 65
End: 2020-07-23
Attending: INTERNAL MEDICINE
Payer: COMMERCIAL

## 2020-07-23 VITALS
SYSTOLIC BLOOD PRESSURE: 140 MMHG | DIASTOLIC BLOOD PRESSURE: 90 MMHG | TEMPERATURE: 97 F | HEART RATE: 98 BPM | RESPIRATION RATE: 20 BRPM

## 2020-07-23 DIAGNOSIS — Z85.3 HISTORY OF BREAST CANCER: Primary | ICD-10-CM

## 2020-07-23 PROCEDURE — 63600175 PHARM REV CODE 636 W HCPCS: Mod: JG,PN | Performed by: INTERNAL MEDICINE

## 2020-07-23 PROCEDURE — 96372 THER/PROPH/DIAG INJ SC/IM: CPT | Mod: PN

## 2020-07-23 RX ADMIN — DENOSUMAB 120 MG: 120 INJECTION SUBCUTANEOUS at 03:07

## 2020-08-26 ENCOUNTER — INFUSION (OUTPATIENT)
Dept: INFUSION THERAPY | Facility: HOSPITAL | Age: 65
End: 2020-08-26
Attending: INTERNAL MEDICINE
Payer: COMMERCIAL

## 2020-08-26 VITALS
HEART RATE: 117 BPM | RESPIRATION RATE: 18 BRPM | SYSTOLIC BLOOD PRESSURE: 197 MMHG | DIASTOLIC BLOOD PRESSURE: 97 MMHG | TEMPERATURE: 97 F

## 2020-08-26 DIAGNOSIS — Z85.3 HISTORY OF BREAST CANCER: Primary | ICD-10-CM

## 2020-08-26 PROCEDURE — 96372 THER/PROPH/DIAG INJ SC/IM: CPT | Mod: PN

## 2020-08-26 PROCEDURE — 63600175 PHARM REV CODE 636 W HCPCS: Mod: JG,PN | Performed by: INTERNAL MEDICINE

## 2020-08-26 RX ADMIN — DENOSUMAB 120 MG: 120 INJECTION SUBCUTANEOUS at 12:08

## 2020-09-23 ENCOUNTER — INFUSION (OUTPATIENT)
Dept: INFUSION THERAPY | Facility: HOSPITAL | Age: 65
End: 2020-09-23
Attending: INTERNAL MEDICINE
Payer: COMMERCIAL

## 2020-09-23 VITALS
TEMPERATURE: 99 F | HEIGHT: 64 IN | WEIGHT: 217 LBS | DIASTOLIC BLOOD PRESSURE: 73 MMHG | HEART RATE: 92 BPM | SYSTOLIC BLOOD PRESSURE: 135 MMHG | BODY MASS INDEX: 37.05 KG/M2 | RESPIRATION RATE: 16 BRPM

## 2020-09-23 DIAGNOSIS — Z85.3 HISTORY OF BREAST CANCER: Primary | ICD-10-CM

## 2020-09-23 PROCEDURE — 96372 THER/PROPH/DIAG INJ SC/IM: CPT | Mod: PN

## 2020-09-23 PROCEDURE — 63600175 PHARM REV CODE 636 W HCPCS: Mod: JG,PN | Performed by: PHYSICIAN ASSISTANT

## 2020-09-23 RX ADMIN — DENOSUMAB 120 MG: 120 INJECTION SUBCUTANEOUS at 02:09

## 2020-09-23 NOTE — PLAN OF CARE
Problem: Adult Inpatient Plan of Care  Goal: Plan of Care Review  Outcome: Ongoing, Progressing  Flowsheets (Taken 9/23/2020 2420)  Plan of Care Reviewed With: patient   Patient tolerated Xgeva injection well, d/c in no acute distress.

## 2020-10-21 ENCOUNTER — INFUSION (OUTPATIENT)
Dept: INFUSION THERAPY | Facility: HOSPITAL | Age: 65
End: 2020-10-21
Attending: INTERNAL MEDICINE
Payer: COMMERCIAL

## 2020-10-21 VITALS
SYSTOLIC BLOOD PRESSURE: 143 MMHG | DIASTOLIC BLOOD PRESSURE: 75 MMHG | TEMPERATURE: 97 F | RESPIRATION RATE: 18 BRPM | HEART RATE: 95 BPM

## 2020-10-21 DIAGNOSIS — Z85.3 HISTORY OF BREAST CANCER: Primary | ICD-10-CM

## 2020-10-21 PROCEDURE — 96372 THER/PROPH/DIAG INJ SC/IM: CPT | Mod: PN

## 2020-10-21 PROCEDURE — 63600175 PHARM REV CODE 636 W HCPCS: Mod: JG,PN | Performed by: PHYSICIAN ASSISTANT

## 2020-10-21 RX ADMIN — DENOSUMAB 120 MG: 120 INJECTION SUBCUTANEOUS at 02:10

## 2020-11-17 ENCOUNTER — TELEPHONE (OUTPATIENT)
Dept: INFUSION THERAPY | Facility: HOSPITAL | Age: 65
End: 2020-11-17

## 2020-11-17 NOTE — TELEPHONE ENCOUNTER
Spoke with patient she cancel her xgeva appointment on 11/19/20 due to she is getting dental work done she will call us back to let us know when she can get the xgeva again

## 2020-11-17 NOTE — TELEPHONE ENCOUNTER
----- Message from Leslie Ghosh sent at 11/16/2020  4:12 PM CST -----  Caller: Patient                  Callback number:  [phone]                      Reason: R/S f/u and infusion apt

## 2021-01-25 ENCOUNTER — INFUSION (OUTPATIENT)
Dept: INFUSION THERAPY | Facility: HOSPITAL | Age: 66
End: 2021-01-25
Attending: INTERNAL MEDICINE
Payer: COMMERCIAL

## 2021-01-25 DIAGNOSIS — E86.0 DEHYDRATION: Primary | ICD-10-CM

## 2021-01-25 PROCEDURE — 96361 HYDRATE IV INFUSION ADD-ON: CPT | Mod: PN

## 2021-01-25 PROCEDURE — 96360 HYDRATION IV INFUSION INIT: CPT | Mod: PN

## 2021-01-25 PROCEDURE — 25000003 PHARM REV CODE 250: Mod: PN | Performed by: PHYSICIAN ASSISTANT

## 2021-01-25 RX ORDER — HEPARIN 100 UNIT/ML
500 SYRINGE INTRAVENOUS
Status: CANCELLED | OUTPATIENT
Start: 2021-01-25

## 2021-01-25 RX ORDER — SODIUM CHLORIDE 0.9 % (FLUSH) 0.9 %
10 SYRINGE (ML) INJECTION
Status: CANCELLED | OUTPATIENT
Start: 2021-01-25

## 2021-01-25 RX ORDER — SODIUM CHLORIDE 0.9 % (FLUSH) 0.9 %
10 SYRINGE (ML) INJECTION
Status: DISCONTINUED | OUTPATIENT
Start: 2021-01-25 | End: 2021-01-25 | Stop reason: HOSPADM

## 2021-01-25 RX ADMIN — SODIUM CHLORIDE 1000 ML: 9 INJECTION, SOLUTION INTRAVENOUS at 01:01

## 2021-02-25 ENCOUNTER — DOCUMENTATION ONLY (OUTPATIENT)
Dept: INFUSION THERAPY | Facility: HOSPITAL | Age: 66
End: 2021-02-25

## 2021-05-10 ENCOUNTER — PATIENT MESSAGE (OUTPATIENT)
Dept: RESEARCH | Facility: HOSPITAL | Age: 66
End: 2021-05-10

## 2021-05-27 PROBLEM — R94.39 ABNORMAL STRESS TEST: Status: ACTIVE | Noted: 2021-05-27

## 2022-09-21 PROBLEM — I20.0 UNSTABLE ANGINA: Status: ACTIVE | Noted: 2022-09-21

## 2022-09-27 PROBLEM — R94.39 ABNORMAL STRESS TEST: Status: RESOLVED | Noted: 2021-05-27 | Resolved: 2022-09-27

## 2022-09-27 PROBLEM — Z95.5 S/P RIGHT CORONARY ARTERY (RCA) STENT PLACEMENT: Status: ACTIVE | Noted: 2022-09-27

## 2022-09-27 PROBLEM — I20.0 UNSTABLE ANGINA: Status: RESOLVED | Noted: 2022-09-21 | Resolved: 2022-09-27

## 2022-09-28 ENCOUNTER — SPECIALTY PHARMACY (OUTPATIENT)
Dept: PHARMACY | Facility: CLINIC | Age: 67
End: 2022-09-28
Payer: COMMERCIAL

## 2022-09-28 NOTE — TELEPHONE ENCOUNTER
Shama, this is Mariann Jones with [facility].  We are working on your prescription that your doctor has sent us. We will be working with your insurance to get this approved for you. We will be calling you along the way with updates on your medication.  If you have any questions, you can reach us at [phone].    Welcome call outcome: Left voicemail

## 2022-09-28 NOTE — TELEPHONE ENCOUNTER
Order received for Repatha. Outgoing call to patient to get information about past medications tried which is needed for PA. No answer, LVM.

## 2022-10-05 NOTE — TELEPHONE ENCOUNTER
Incoming call from pt regarding Repatha PA.  Informed pt that PA was denied and [facility] is currently working to appeal decision.  Informed pt that [facility] will contact her once appeal decision is received.  Pt verbalized understanding.

## 2022-10-05 NOTE — TELEPHONE ENCOUNTER
Outgoing call to patient about Repatha PA denial and appeal process. No answer, LVM. Staff message sent to [facility] requesting additional information for appeal.      - List of statins tried     - Symptoms of statin intolerance     - Why Zetia was stopped

## 2022-10-06 NOTE — TELEPHONE ENCOUNTER
PER NP: Patient experienced extreme nausea & vomiting with Lipitor, Crestor, and Zetia in the past.

## 2022-10-12 ENCOUNTER — SPECIALTY PHARMACY (OUTPATIENT)
Dept: PHARMACY | Facility: CLINIC | Age: 67
End: 2022-10-12
Payer: COMMERCIAL

## 2022-10-12 DIAGNOSIS — E78.00 PURE HYPERCHOLESTEROLEMIA: Primary | ICD-10-CM

## 2022-10-12 NOTE — TELEPHONE ENCOUNTER
Benefit Investigation    Repatha  [facility] per Casey S. / Ludmila L.  Deductible: none  Max OOP: none  Benefit cap: none  Estimated copay: $0  [facility] in network    FA not required, forward to initial

## 2022-10-14 NOTE — TELEPHONE ENCOUNTER
Specialty Pharmacy - Initial Clinical Assessment    Specialty Medication Orders Linked to Encounter      Flowsheet Row Most Recent Value   Medication #1 evolocumab (REPATHA SURECLICK) 140 mg/mL PnIj (Order#[id number], Rx#[id number])          Patient Diagnosis   E78.00 - Pure hypercholesterolemia    Subjective    Yasmin Verde is a 66 y.o. female, who is followed by the specialty pharmacy service for management and education.    Recent Encounters       Date Type Provider Description    10/12/2022 Specialty Pharmacy Mariann Qureshi PharmD Initial Clinical Assessment    09/28/2022 Specialty Pharmacy Mariann Qureshi PharmD Referral Authorization          Clinical call attempts since last clinical assessment   10/12/2022  4:37 PM - Specialty Pharmacy - Clinical Assessment by Mariann Qureshi PharmD     Current Outpatient Medications   Medication Sig    aspirin (ECOTRIN) 81 MG EC tablet Take 1 tablet (81 mg total) by mouth once daily.    clopidogreL (PLAVIX) 75 mg tablet TAKE 1 TABLET BY MOUTH ONCE DAILY    cyanocobalamin, vitamin B-12, 1,000 mcg/15 mL Liqd Take by mouth.    denosumab (XGEVA) 120 mg/1.7 mL (70 mg/mL) Soln Inject 120 mg into the skin once. Every 28 days    evolocumab (REPATHA SURECLICK) 140 mg/mL PnIj Inject 1 mL (140 mg total) into the skin every 14 (fourteen) days.    glyBURIDE-metformin 5-500 mg (GLUCOVANCE) 5-500 mg Tab Take 2 tablets by mouth 2 (two) times daily with meals.    letrozole (FEMARA) 2.5 mg Tab Take 1 tablet (2.5 mg total) by mouth once daily. (Patient taking differently: Take 2.5 mg by mouth after lunch.)    levothyroxine (SYNTHROID) 50 MCG tablet Take 100 mcg by mouth before breakfast.    liothyronine (CYTOMEL) 5 MCG Tab Take 15 mcg by mouth after lunch.    metoprolol succinate (TOPROL-XL) 25 MG 24 hr tablet Take 25 mg by mouth with lunch.    NON FORMULARY MEDICATION Vitamin D oral drops (unsure of mg)    ondansetron (ZOFRAN) 4 MG tablet     ONETOUCH ULTRA TEST Strp CHECK BLOOD  "GLUCOSE BEFORE MEALS AND QHS    oxyCODONE-acetaminophen (PERCOCET)  mg per tablet Take 1 tablet by mouth every 8 (eight) hours as needed for Pain. (Patient taking differently: Take 1 tablet by mouth nightly.)    palbociclib 125 mg Tab Take 125 mg by mouth once daily. On days 1-21 and 7 days off every 28 days (Patient taking differently: Take 125 mg by mouth once daily. On days 1-21 and 7 days off every 28 days  "Ibrance")    pantoprazole (PROTONIX) 20 MG tablet Take 20 mg by mouth once daily.     zinc gluconate 50 mg tablet Take 50 mg by mouth once daily.    zolpidem (AMBIEN) 10 mg Tab TK 1/2 TO 1 T PO QHS PRN ONLY   Last reviewed on 9/27/2022 12:49 PM by Cynthia Cabrera NP    Review of patient's allergies indicates:   Allergen Reactions    Bactrim [sulfamethoxazole-trimethoprim] Other (See Comments)     Cause tongue to turn black    Morphine Rash    Phenergan [promethazine] Other (See Comments) and Hallucinations    Betadine [povidone-iodine] Rash    Statins-hmg-coa reductase inhibitors Nausea And Vomiting and Other (See Comments)     GI upset and myalgias With Lipitor and Crestor    Zetia [ezetimibe] Nausea And Vomiting   Last reviewed on  10/6/2022 10:25 AM by Cynthia Cabrera    Drug Interactions    Drug interactions evaluated: no  Clinically relevant drug interactions identified: no  Provided the patient with educational material regarding drug interactions: not applicable         Adverse Effects    Unable to perform a full ROS: Yes   Reason: other          Assessment Questions - Documented Responses      Flowsheet Row Most Recent Value   Assessment    Medication Reconciliation completed for patient Yes   Goals of Therapy Status Discussed (new start)   Status of the patients ability to self-administer: Is Able   All education points have been covered with patient? Yes, supplemental printed education provided   Welcome packet contents reviewed and discussed with patient? Yes   Assesment completed? " "Yes   Plan Therapy being initiated   Do you need to open a clinical intervention (i-vent)? No   Do you want to schedule first shipment? Yes   Medication #1 Assessment Info    Patient status New medication   Is this medication appropriate for the patient? Yes   Is this medication effective? Not yet started          Refill Questions - Documented Responses      Flowsheet Row Most Recent Value   Patient Availability and HIPAA Verification    Does patient want to proceed with activity? Yes   HIPAA/medical authority confirmed? Yes   Relationship to patient of person spoken to? Self   Refill Screening Questions    When does the patient need to receive the medication? 10/19/22   Refill Delivery Questions    How will the patient receive the medication? MEDRx   When does the patient need to receive the medication? 10/19/22   Shipping Address Home   Address in [facility] confirmed and updated if neccessary? Yes   Expected Copay ($) 0   Is the patient able to afford the medication copay? Yes   Payment Method zero copay   Days supply of Refill 28   Supplies needed? No supplies needed   Refill activity completed? Yes   Refill activity plan Refill scheduled   Shipment/Pickup Date: 10/18/22            Objective    She has a past medical history of Cancer, Diabetes mellitus, Dizziness, Headache(784.0), Hypothyroidism, Obesity, Otitis media, and Renal disorder.        BP Readings from Last 4 Encounters:   09/27/22 (!) 146/78   09/20/22 122/68   09/12/22 128/78   03/28/22 130/78     Ht Readings from Last 4 Encounters:   10/14/22 5' 4" (1.626 m)   09/27/22 5' 4" (1.626 m)   09/20/22 5' 4" (1.626 m)   09/12/22 5' 4" (1.626 m)     Wt Readings from Last 4 Encounters:   10/14/22 100.7 kg (222 lb)   09/27/22 100.7 kg (222 lb)   09/20/22 101.6 kg (224 lb)   09/12/22 101.6 kg (224 lb)       The goals of prescribed drug therapy management include:  Supporting patient to meet the prescriber's medical treatment objectives  Improving or " maintaining quality of life  Maintaining optimal therapy adherence  Minimizing and managing side effects      Goals of Therapy Status: Discussed (new start)    Assessment/Plan  Patient plans to start therapy on 10/19/22      Indication, dosage, appropriateness, effectiveness, safety and convenience of her specialty medication(s) were reviewed today.     Patient Education   Patient received education on the following:   Expectations and possible outcomes of therapy  Proper use, timely administration, and missed dose management  Duration of therapy  Side effects, including prevention, minimization, and management  Contraindications and safety precautions  New or changed medications, including prescribe and over the counter medications and supplements  Reviews recommended vaccinations, as appropriate  Storage, safe handling, and disposal        Tasks added this encounter   No tasks added.   Tasks due within next 3 months   10/12/2022 - Clinical - Initial Assessment     Mariann Jones, PharmD  [facility]  [address] [place] [id number]  Phone: [phone]  Fax: [phone]

## 2022-11-09 ENCOUNTER — SPECIALTY PHARMACY (OUTPATIENT)
Dept: PHARMACY | Facility: CLINIC | Age: 67
End: 2022-11-09
Payer: COMMERCIAL

## 2022-11-09 NOTE — TELEPHONE ENCOUNTER
Outgoing call regarding repatha refill; per pt, she was waiting to get her covid booster, so she hasn't started the med; she still has 2 pens on hand and said she will inject next week; informed her that [facility] will follow up on 12/5 to schedule delivery; also informed that a refill request was sent over to md

## 2022-12-15 ENCOUNTER — SPECIALTY PHARMACY (OUTPATIENT)
Dept: PHARMACY | Facility: CLINIC | Age: 67
End: 2022-12-15
Payer: COMMERCIAL

## 2022-12-15 NOTE — TELEPHONE ENCOUNTER
Specialty Pharmacy - Refill Coordination    Specialty Medication Orders Linked to Encounter      Flowsheet Row Most Recent Value   Medication #1 evolocumab (REPATHA SURECLICK) 140 mg/mL PnIj (Order#[id number], Rx#[id number])            Refill Questions - Documented Responses      Flowsheet Row Most Recent Value   Patient Availability and HIPAA Verification    Does patient want to proceed with activity? Yes   HIPAA/medical authority confirmed? Yes   Relationship to patient of person spoken to? Self   Refill Screening Questions    Would patient like to speak to a pharmacist? No   When does the patient need to receive the medication? 12/25/22   Refill Delivery Questions    How will the patient receive the medication? MEDRx   When does the patient need to receive the medication? 12/25/22   Shipping Address Home   Address in [facility] confirmed and updated if neccessary? Yes   Expected Copay ($) 0   Is the patient able to afford the medication copay? Yes   Payment Method zero copay   Days supply of Refill 28   Supplies needed? No supplies needed   Refill activity completed? Yes   Refill activity plan Refill scheduled   Shipment/Pickup Date: 12/16/22            Current Outpatient Medications   Medication Sig    aspirin (ECOTRIN) 81 MG EC tablet Take 1 tablet (81 mg total) by mouth once daily.    clopidogreL (PLAVIX) 75 mg tablet TAKE 1 TABLET BY MOUTH ONCE DAILY    cyanocobalamin, vitamin B-12, 1,000 mcg/15 mL Liqd Take by mouth.    denosumab (XGEVA) 120 mg/1.7 mL (70 mg/mL) Soln Inject 120 mg into the skin once. Every 28 days    evolocumab (REPATHA SURECLICK) 140 mg/mL PnIj Inject 1 mL (140 mg total) into the skin every 14 (fourteen) days.    glyBURIDE-metformin 5-500 mg (GLUCOVANCE) 5-500 mg Tab Take 2 tablets by mouth 2 (two) times daily with meals.    letrozole (FEMARA) 2.5 mg Tab Take 1 tablet (2.5 mg total) by mouth once daily. (Patient taking differently: Take 2.5 mg by mouth after lunch.)    levothyroxine  "(SYNTHROID) 50 MCG tablet Take 100 mcg by mouth before breakfast.    liothyronine (CYTOMEL) 5 MCG Tab Take 15 mcg by mouth after lunch.    metoprolol succinate (TOPROL-XL) 25 MG 24 hr tablet Take 25 mg by mouth with lunch.    NON FORMULARY MEDICATION Vitamin D oral drops (unsure of mg)    ondansetron (ZOFRAN) 4 MG tablet     ONETOUCH ULTRA TEST Strp CHECK BLOOD GLUCOSE BEFORE MEALS AND QHS    oxyCODONE-acetaminophen (PERCOCET)  mg per tablet Take 1 tablet by mouth every 8 (eight) hours as needed for Pain. (Patient taking differently: Take 1 tablet by mouth nightly.)    palbociclib 125 mg Tab Take 125 mg by mouth once daily. On days 1-21 and 7 days off every 28 days (Patient taking differently: Take 125 mg by mouth once daily. On days 1-21 and 7 days off every 28 days  "Ibrance")    pantoprazole (PROTONIX) 20 MG tablet Take 20 mg by mouth once daily.     zinc gluconate 50 mg tablet Take 50 mg by mouth once daily.    zolpidem (AMBIEN) 10 mg Tab TK 1/2 TO 1 T PO QHS PRN ONLY   Last reviewed on 9/27/2022 12:49 PM by Cynthia Cabrera NP    Review of patient's allergies indicates:   Allergen Reactions    Bactrim [sulfamethoxazole-trimethoprim] Other (See Comments)     Cause tongue to turn black    Morphine Rash    Phenergan [promethazine] Other (See Comments) and Hallucinations    Betadine [povidone-iodine] Rash    Statins-hmg-coa reductase inhibitors Nausea And Vomiting and Other (See Comments)     GI upset and myalgias With Lipitor and Crestor    Zetia [ezetimibe] Nausea And Vomiting    Last reviewed on  10/6/2022 10:25 AM by Cynthia Cabrera      Tasks added this encounter   1/15/2023 - Refill Call (Auto Added)   Tasks due within next 3 months   No tasks due.     Vivienne Jaime [facility]  [address] [facility] [address]  Phone: [phone]  Fax: [phone]        "

## 2023-01-20 ENCOUNTER — SPECIALTY PHARMACY (OUTPATIENT)
Dept: PHARMACY | Facility: CLINIC | Age: 68
End: 2023-01-20
Payer: COMMERCIAL

## 2023-01-20 NOTE — TELEPHONE ENCOUNTER
Repatha PA on file from 10/10/22 to 4/10/23.   Co-pay $112.25    Outgoing call to patient to offer to apply for the [employer] martina on her behalf. Will need HH size and annual income info. No answer, LVM.

## 2023-01-23 NOTE — TELEPHONE ENCOUNTER
Outgoing call to patient to offer to apply for the [facility] X-IO martina on her behalf. Will need HH size and annual income info. Representative stated patient was unavailable.

## 2023-01-24 NOTE — TELEPHONE ENCOUNTER
Incoming call regarding Repatha refill. Pt stated she wont need the medication due to having accumulation of medication, however pt would like to apply for assistance PAP for her Feb fill. Provided Hhs and Income to [facility]. Routing to assigned pharmacist. Pt granted permission for martina application to get started on her behalf.

## 2023-01-24 NOTE — TELEPHONE ENCOUNTER
Patient gave permission to secure [facility] martina. [facility] under diagnosis verification for Repatha from 12/25/22 - 12/24/23 for a max benefit of $2500.     ID: [id number]  BIN: [id number]  PCN: [id number]  GRP: [id number]  ID: [id number]    Faxed diagnosis verification form to  at [phone]. Will continue to monitor.

## 2023-02-14 ENCOUNTER — SPECIALTY PHARMACY (OUTPATIENT)
Dept: PHARMACY | Facility: CLINIC | Age: 68
End: 2023-02-14

## 2023-02-14 NOTE — TELEPHONE ENCOUNTER
Outgoing call regarding repatha refill; per pt, she's due to inject on 2/18 and has a pen on hand; next injection is on 3/4, and pt was informed that [facility] will follow up on 2/22 to schedule delivery

## 2023-02-22 ENCOUNTER — PATIENT MESSAGE (OUTPATIENT)
Dept: PHARMACY | Facility: CLINIC | Age: 68
End: 2023-02-22
Payer: COMMERCIAL

## 2023-02-22 NOTE — TELEPHONE ENCOUNTER
Outgoing call to patient regarding Repatha refill. Rep stated patient is not available. Patient portal message sent.

## 2023-03-02 NOTE — TELEPHONE ENCOUNTER
Outgoing call to patient for Repatha refill. Patient has one dose on hand for next injection 3/9. Patient admitted to missing a dose and will be getting back on schedule. Pending refill call to 3/16 for next dose due 3/23 and opening Ivent to assess adherence at next refill.    dvt ppx: hold lovenox d/t coffee ground. SCDs.   Code status: patient's son reports full code. Prior MOLST in chart but unable to find.  :  sonBrayden [phone]  daughterMichaelle [phone]; [phone]  dispo: likely d/c tomorrow if sx are resolved and tolerating a diet. dvt ppx: hold lovenox d/t coffee ground. SCDs.   Code status: patient's son reports full code. Prior MOLST in chart but unable to find.  :  son, Brayden [phone]  daughter, Michaelle [phone]; [phone]  dispo: likely d/c tomorrow if sx are resolved and tolerating a diet.    Complex MDM: review of imaging, review of ECG, labwork, previous records, discussion w/ consultant Dr. Carrasco, managing issues above.

## 2023-04-03 NOTE — TELEPHONE ENCOUNTER
Spoke with patients spouse Marcus. Patient is not available at this time. Will call back for refill at 4:30 PM

## 2023-04-05 NOTE — TELEPHONE ENCOUNTER
Specialty Pharmacy - Refill Coordination    Specialty Medication Orders Linked to Encounter      Flowsheet Row Most Recent Value   Medication #1 evolocumab (REPATHA SURECLICK) 140 mg/mL PnIj (Order#[id number], Rx#[id number])            Refill Questions - Documented Responses      Flowsheet Row Most Recent Value   Patient Availability and HIPAA Verification    Does patient want to proceed with activity? Yes   HIPAA/medical authority confirmed? Yes   Relationship to patient of person spoken to? Self   Refill Screening Questions    Would patient like to speak to a pharmacist? No   When does the patient need to receive the medication? 04/15/23   Refill Delivery Questions    How will the patient receive the medication? MEDRx   When does the patient need to receive the medication? 04/15/23   Shipping Address Home   Address in [facility] confirmed and updated if neccessary? Yes   Expected Copay ($) 0   Is the patient able to afford the medication copay? Yes   Payment Method zero copay   Days supply of Refill 28   Supplies needed? No supplies needed   Refill activity completed? Yes   Refill activity plan Refill scheduled   Shipment/Pickup Date: 04/13/23            Current Outpatient Medications   Medication Sig    aspirin (ECOTRIN) 81 MG EC tablet Take 1 tablet (81 mg total) by mouth once daily.    clopidogreL (PLAVIX) 75 mg tablet TAKE 1 TABLET BY MOUTH ONCE DAILY    cyanocobalamin, vitamin B-12, 1,000 mcg/15 mL Liqd Take by mouth.    denosumab (XGEVA) 120 mg/1.7 mL (70 mg/mL) Soln Inject 120 mg into the skin once. Every 28 days    evolocumab (REPATHA SURECLICK) 140 mg/mL PnIj Inject 1 mL (140 mg total) into the skin every 14 (fourteen) days.    glyBURIDE-metformin 5-500 mg (GLUCOVANCE) 5-500 mg Tab Take 2 tablets by mouth 2 (two) times daily with meals.    letrozole (FEMARA) 2.5 mg Tab Take 1 tablet (2.5 mg total) by mouth once daily.    levothyroxine (SYNTHROID) 88 MCG tablet Take 88 mcg by mouth before breakfast.  "   liothyronine (CYTOMEL) 5 MCG Tab Take 10 mcg by mouth after lunch.    metoprolol succinate (TOPROL-XL) 25 MG 24 hr tablet Take 25 mg by mouth with lunch.    NON FORMULARY MEDICATION Vitamin D oral drops (unsure of mg)    ondansetron (ZOFRAN) 4 MG tablet     ONETOUCH ULTRA TEST Strp CHECK BLOOD GLUCOSE BEFORE MEALS AND QHS    oxyCODONE-acetaminophen (PERCOCET)  mg per tablet Take 1 tablet by mouth every 8 (eight) hours as needed for Pain. (Patient taking differently: Take 1 tablet by mouth nightly.)    palbociclib 125 mg Tab Take 125 mg by mouth once daily. On days 1-21 and 7 days off every 28 days (Patient taking differently: Take 125 mg by mouth once daily. On days 1-21 and 7 days off every 28 days  "Ibrance")    pantoprazole (PROTONIX) 20 MG tablet Take 20 mg by mouth once daily.     zinc gluconate 50 mg tablet Take 50 mg by mouth once a week.    zolpidem (AMBIEN) 10 mg Tab TK 1/2 TO 1 T PO QHS PRN ONLY   Last reviewed on 2/14/2023  4:30 PM by Cynthia Cabrera NP    Review of patient's allergies indicates:   Allergen Reactions    Bactrim [sulfamethoxazole-trimethoprim] Other (See Comments)     Cause tongue to turn black    Morphine Rash    Phenergan [promethazine] Other (See Comments) and Hallucinations    Betadine [povidone-iodine] Rash    Statins-hmg-coa reductase inhibitors Nausea And Vomiting and Other (See Comments)     GI upset and myalgias With Lipitor and Crestor    Zetia [ezetimibe] Nausea And Vomiting    Last reviewed on  2/14/2023 1:26 PM by Lorie Mullins    Interventions added this encounter   Open: [facility] Patient Intervention - Drug therapy adherence: evolocumab (REPATHA SURECLICK) 140 mg/mL PnIj     Tasks added this encounter   5/6/2023 - Refill Call (Auto Added)   Tasks due within next 3 months   No tasks due.     Krysten Waite, PharmD  [facility]  [address] [place] [id number]  Phone: [phone]  Fax: [phone]        "

## 2023-05-10 ENCOUNTER — PATIENT MESSAGE (OUTPATIENT)
Dept: PHARMACY | Facility: CLINIC | Age: 68
End: 2023-05-10
Payer: COMMERCIAL

## 2023-06-19 ENCOUNTER — SPECIALTY PHARMACY (OUTPATIENT)
Dept: PHARMACY | Facility: CLINIC | Age: 68
End: 2023-06-19
Payer: COMMERCIAL

## 2023-06-26 ENCOUNTER — SPECIALTY PHARMACY (OUTPATIENT)
Dept: PHARMACY | Facility: CLINIC | Age: 68
End: 2023-06-26
Payer: COMMERCIAL

## 2023-06-26 NOTE — TELEPHONE ENCOUNTER
Specialty Pharmacy - Refill Coordination    Specialty Medication Orders Linked to Encounter      Flowsheet Row Most Recent Value   Medication #1 evolocumab (REPATHA SURECLICK) 140 mg/mL PnIj (Order#[id number], Rx#[id number])            Refill Questions - Documented Responses      Flowsheet Row Most Recent Value   Patient Availability and HIPAA Verification    Does patient want to proceed with activity? Yes   HIPAA/medical authority confirmed? Yes   Relationship to patient of person spoken to? Self   Refill Screening Questions    Would patient like to speak to a pharmacist? No   When does the patient need to receive the medication? 06/27/23   Refill Delivery Questions    How will the patient receive the medication? MEDRx   When does the patient need to receive the medication? 06/27/23   Shipping Address Home   Address in [facility] confirmed and updated if neccessary? Yes   Expected Copay ($) 0   Is the patient able to afford the medication copay? Yes   Payment Method zero copay   Days supply of Refill 28   Supplies needed? No supplies needed   Refill activity completed? Yes   Refill activity plan Refill scheduled   Shipment/Pickup Date: 06/26/23            Current Outpatient Medications   Medication Sig    aspirin (ECOTRIN) 81 MG EC tablet Take 1 tablet (81 mg total) by mouth once daily.    clopidogreL (PLAVIX) 75 mg tablet TAKE 1 TABLET BY MOUTH ONCE DAILY    cyanocobalamin, vitamin B-12, 1,000 mcg/15 mL Liqd Take by mouth.    denosumab (XGEVA) 120 mg/1.7 mL (70 mg/mL) Soln Inject 120 mg into the skin once. Every 28 days    evolocumab (REPATHA SURECLICK) 140 mg/mL PnIj Inject 1 mL (140 mg total) into the skin every 14 (fourteen) days.    glyBURIDE-metformin 5-500 mg (GLUCOVANCE) 5-500 mg Tab Take 2 tablets by mouth 2 (two) times daily with meals.    letrozole (FEMARA) 2.5 mg Tab Take 1 tablet (2.5 mg total) by mouth once daily.    levothyroxine (SYNTHROID) 88 MCG tablet Take 88 mcg by mouth before breakfast.  "   liothyronine (CYTOMEL) 5 MCG Tab Take 10 mcg by mouth after lunch.    metoprolol succinate (TOPROL-XL) 25 MG 24 hr tablet Take 25 mg by mouth with lunch.    NON FORMULARY MEDICATION Vitamin D oral drops (unsure of mg)    ondansetron (ZOFRAN) 4 MG tablet     ONETOUCH ULTRA TEST Strp CHECK BLOOD GLUCOSE BEFORE MEALS AND QHS    oxyCODONE-acetaminophen (PERCOCET)  mg per tablet Take 1 tablet by mouth every 8 (eight) hours as needed for Pain. (Patient taking differently: Take 1 tablet by mouth nightly.)    palbociclib 125 mg Tab Take 125 mg by mouth once daily. On days 1-21 and 7 days off every 28 days (Patient taking differently: Take 125 mg by mouth once daily. On days 1-21 and 7 days off every 28 days  "Ibrance")    pantoprazole (PROTONIX) 20 MG tablet Take 20 mg by mouth once daily.     zinc gluconate 50 mg tablet Take 50 mg by mouth once a week.    zolpidem (AMBIEN) 10 mg Tab TK 1/2 TO 1 T PO QHS PRN ONLY   Last reviewed on 2/14/2023  4:30 PM by Cynthia Cabrera NP    Review of patient's allergies indicates:   Allergen Reactions    Bactrim [sulfamethoxazole-trimethoprim] Other (See Comments)     Cause tongue to turn black    Morphine Rash    Phenergan [promethazine] Other (See Comments) and Hallucinations    Betadine [povidone-iodine] Rash    Statins-hmg-coa reductase inhibitors Nausea And Vomiting and Other (See Comments)     GI upset and myalgias With Lipitor and Crestor    Zetia [ezetimibe] Nausea And Vomiting    Last reviewed on  4/10/2023 1:37 PM by Lorie Mullins      Tasks added this encounter   No tasks added.   Tasks due within next 3 months   6/26/2023 - Refill Coordination Outreach (1 time occurrence)     Mariann Porter, LeticiaD  [facility]  [address] [place] [id number]  Phone: [phone]  Fax: [phone]        "

## 2023-07-24 ENCOUNTER — SPECIALTY PHARMACY (OUTPATIENT)
Dept: PHARMACY | Facility: CLINIC | Age: 68
End: 2023-07-24
Payer: COMMERCIAL

## 2023-07-24 NOTE — TELEPHONE ENCOUNTER
Specialty Pharmacy - Refill Coordination    Specialty Medication Orders Linked to Encounter      Flowsheet Row Most Recent Value   Medication #1 evolocumab (REPATHA SURECLICK) 140 mg/mL PnIj (Order#[id number], Rx#[id number])            Refill Questions - Documented Responses      Flowsheet Row Most Recent Value   Patient Availability and HIPAA Verification    Does patient want to proceed with activity? Yes   HIPAA/medical authority confirmed? Yes   Relationship to patient of person spoken to? Self   Refill Screening Questions    Would patient like to speak to a pharmacist? No   When does the patient need to receive the medication? 07/27/23   Refill Delivery Questions    How will the patient receive the medication? MEDRx   When does the patient need to receive the medication? 07/27/23   Shipping Address Home   Address in [facility] confirmed and updated if neccessary? Yes   Expected Copay ($) 0   Is the patient able to afford the medication copay? Yes   Payment Method zero copay   Days supply of Refill 28   Supplies needed? No supplies needed   Refill activity completed? Yes   Refill activity plan Refill scheduled   Shipment/Pickup Date: 07/25/23            Current Outpatient Medications   Medication Sig    aspirin (ECOTRIN) 81 MG EC tablet Take 1 tablet (81 mg total) by mouth once daily.    clopidogreL (PLAVIX) 75 mg tablet TAKE 1 TABLET BY MOUTH ONCE DAILY    cyanocobalamin, vitamin B-12, 1,000 mcg/15 mL Liqd Take by mouth.    denosumab (XGEVA) 120 mg/1.7 mL (70 mg/mL) Soln Inject 120 mg into the skin once. Every 28 days    evolocumab (REPATHA SURECLICK) 140 mg/mL PnIj Inject 1 mL (140 mg total) into the skin every 14 (fourteen) days.    glyBURIDE-metformin 5-500 mg (GLUCOVANCE) 5-500 mg Tab Take 2 tablets by mouth 2 (two) times daily with meals.    letrozole (FEMARA) 2.5 mg Tab Take 1 tablet (2.5 mg total) by mouth once daily.    levothyroxine (SYNTHROID) 88 MCG tablet Take 88 mcg by mouth before breakfast.     liothyronine (CYTOMEL) 5 MCG Tab Take 10 mcg by mouth after lunch.    metoprolol succinate (TOPROL-XL) 25 MG 24 hr tablet Take 25 mg by mouth with lunch.    NON FORMULARY MEDICATION Vitamin D oral drops (unsure of mg)    ondansetron (ZOFRAN) 4 MG tablet     ONETOUCH ULTRA TEST Strp CHECK BLOOD GLUCOSE BEFORE MEALS AND QHS    oxyCODONE-acetaminophen (PERCOCET)  mg per tablet Take 1 tablet by mouth every 8 (eight) hours as needed for Pain.    palbociclib 125 mg Tab Take 125 mg by mouth once daily. On days 1-21 and 7 days off every 28 days    pantoprazole (PROTONIX) 20 MG tablet Take 20 mg by mouth once daily.     zinc gluconate 50 mg tablet Take 50 mg by mouth once a week.    zolpidem (AMBIEN) 10 mg Tab TK 1/2 TO 1 T PO QHS PRN ONLY   Last reviewed on 7/12/2023 12:28 PM by Joanna Alfredo NP    Review of patient's allergies indicates:   Allergen Reactions    Bactrim [sulfamethoxazole-trimethoprim] Other (See Comments)     Cause tongue to turn black    Morphine Rash    Phenergan [promethazine] Other (See Comments) and Hallucinations    Betadine [povidone-iodine] Rash    Statins-hmg-coa reductase inhibitors Nausea And Vomiting and Other (See Comments)     GI upset and myalgias With Lipitor and Crestor    Zetia [ezetimibe] Nausea And Vomiting    Last reviewed on  7/12/2023 12:28 PM by Joanna Alfredo      Tasks added this encounter   No tasks added.   Tasks due within next 3 months   7/23/2023 - Refill Coordination Outreach (1 time occurrence)     Mariann Porter, LeticiaD  [facility]  [address] [facility] [address]  Phone: [phone]  Fax: [phone]

## 2023-11-15 PROBLEM — J98.6 DIAPHRAGM DYSFUNCTION: Status: ACTIVE | Noted: 2023-11-15

## 2024-03-19 ENCOUNTER — OFFICE VISIT (OUTPATIENT)
Dept: RHEUMATOLOGY | Facility: CLINIC | Age: 69
End: 2024-03-19
Payer: COMMERCIAL

## 2024-03-19 VITALS
BODY MASS INDEX: 37.52 KG/M2 | WEIGHT: 219.81 LBS | SYSTOLIC BLOOD PRESSURE: 167 MMHG | HEART RATE: 94 BPM | DIASTOLIC BLOOD PRESSURE: 91 MMHG | HEIGHT: 64 IN

## 2024-03-19 DIAGNOSIS — K22.89 PATULOUS LOWER ESOPHAGEAL SPHINCTER: ICD-10-CM

## 2024-03-19 DIAGNOSIS — M35.01 SICCA SYNDROME WITH KERATOCONJUNCTIVITIS: Primary | ICD-10-CM

## 2024-03-19 DIAGNOSIS — M35.00 SICCA SYNDROME: ICD-10-CM

## 2024-03-19 PROCEDURE — 3080F DIAST BP >= 90 MM HG: CPT | Mod: CPTII,S$GLB,, | Performed by: STUDENT IN AN ORGANIZED HEALTH CARE EDUCATION/TRAINING PROGRAM

## 2024-03-19 PROCEDURE — 1101F PT FALLS ASSESS-DOCD LE1/YR: CPT | Mod: CPTII,S$GLB,, | Performed by: STUDENT IN AN ORGANIZED HEALTH CARE EDUCATION/TRAINING PROGRAM

## 2024-03-19 PROCEDURE — 3008F BODY MASS INDEX DOCD: CPT | Mod: CPTII,S$GLB,, | Performed by: STUDENT IN AN ORGANIZED HEALTH CARE EDUCATION/TRAINING PROGRAM

## 2024-03-19 PROCEDURE — 3288F FALL RISK ASSESSMENT DOCD: CPT | Mod: CPTII,S$GLB,, | Performed by: STUDENT IN AN ORGANIZED HEALTH CARE EDUCATION/TRAINING PROGRAM

## 2024-03-19 PROCEDURE — 99999 PR PBB SHADOW E&M-EST. PATIENT-LVL V: CPT | Mod: PBBFAC,,, | Performed by: STUDENT IN AN ORGANIZED HEALTH CARE EDUCATION/TRAINING PROGRAM

## 2024-03-19 PROCEDURE — 3077F SYST BP >= 140 MM HG: CPT | Mod: CPTII,S$GLB,, | Performed by: STUDENT IN AN ORGANIZED HEALTH CARE EDUCATION/TRAINING PROGRAM

## 2024-03-19 PROCEDURE — 99205 OFFICE O/P NEW HI 60 MIN: CPT | Mod: S$GLB,,, | Performed by: STUDENT IN AN ORGANIZED HEALTH CARE EDUCATION/TRAINING PROGRAM

## 2024-03-19 PROCEDURE — 1159F MED LIST DOCD IN RCRD: CPT | Mod: CPTII,S$GLB,, | Performed by: STUDENT IN AN ORGANIZED HEALTH CARE EDUCATION/TRAINING PROGRAM

## 2024-03-19 PROCEDURE — 1125F AMNT PAIN NOTED PAIN PRSNT: CPT | Mod: CPTII,S$GLB,, | Performed by: STUDENT IN AN ORGANIZED HEALTH CARE EDUCATION/TRAINING PROGRAM

## 2024-03-19 RX ORDER — PILOCARPINE HYDROCHLORIDE 5 MG/1
5 TABLET, FILM COATED ORAL 2 TIMES DAILY
Qty: 60 TABLET | Refills: 11 | Status: SHIPPED | OUTPATIENT
Start: 2024-03-19 | End: 2025-03-19

## 2024-03-19 NOTE — PROGRESS NOTES
RHEUMATOLOGY OUTPATIENT CLINIC NOTE    3/19/2024    Attending Rheumatologist: Pop Rooney  Primary Care Provider: Freida Warren MD   Physician Requesting Consultation: Nathalie Flores MD  [facility] [place] [address]  Chief Complaint/Reason For Consultation:  Pain      Subjective:       HPI  Yasmin Verde is a 68 y.o. White female with a very extensive and complex medical history including CAD with PCI x 10 (Dr. Henderson - [facility] for pulm htn - negative), R breast cancer in 2010 with mets to L hip (Dr. Betancourt) on Ibrance for 8 years(kinase inhibitor), and letrazol (she had lumpectomy on R and XRT with Dr. Lockett), HTN, CKD, esophageal strictures (been dilated several times), DM2 and LALO (not been able to tolerate due to dry mouth.  Patient was referred due to extremely dry mouth and eyes that started 1 year ago. Patient reports that it is hard to eat, palate gets stuck in roof of mouth due to dryness, having problems with teeth, cannot use CPAP from dry mouth and this is really disturbing for her. She also has dry eyes and skin- you can see flaking from arms/hands and legs.     She also is having problems with diaphragm- CT revealed elevated R hemidiaphragm with diaphragm dysfunction. PFTs 11/2023 - restrictive pattern.    CT Chest of 8/2023 revelas GGOs but recent CT chest makes no mention of these. She follows with Dr Nathalie Flores.       Review of Systems   Constitutional:  Negative for appetite change, chills, fatigue, fever and unexpected weight change.   HENT:  Negative for nasal congestion, ear discharge, ear pain, hearing loss, mouth sores, nosebleeds, sneezing, sore throat, tinnitus and trouble swallowing.    Eyes:  Negative for photophobia, pain, discharge, redness, itching and visual disturbance.   Respiratory:  Negative for cough, chest tightness, shortness of breath and wheezing.    Cardiovascular:  Negative for chest pain, palpitations  and leg swelling.   Gastrointestinal:  Negative for abdominal distention, abdominal pain, blood in stool, constipation, diarrhea, nausea and vomiting.   Endocrine: Negative for cold intolerance, heat intolerance, polydipsia, polyphagia and polyuria.   Genitourinary:  Negative for difficulty urinating, dyspareunia, dysuria, flank pain, frequency, genital sores, hematuria, menstrual problem, pelvic pain, urgency, vaginal bleeding, vaginal discharge, vaginal pain and vaginal dryness.   Musculoskeletal:  Negative for arthralgias, back pain, gait problem, joint swelling, leg pain, myalgias, neck pain, neck stiffness and joint deformity.   Integumentary:  Negative for pallor and rash.   Neurological:  Negative for dizziness, seizures, weakness, light-headedness, numbness and headaches.   Hematological:  Negative for adenopathy. Does not bruise/bleed easily.   Psychiatric/Behavioral:  Negative for confusion, decreased concentration and sleep disturbance. The patient is not nervous/anxious.    All other systems reviewed and are negative.     Answers submitted by the patient for this visit:  Rheumatology Questionnaire (Submitted on 3/18/2024)  fever: No  eye redness: No  mouth sores: No  headaches: Yes  shortness of breath: Yes  chest pain: No  trouble swallowing: Yes  diarrhea: Yes  constipation: No  unexpected weight change: No  genital sore: No  dysuria: No  During the last 3 days, have you had a skin rash?: Yes  Bruises or bleeds easily: Yes  cough: No  Chronic comorbid conditions affecting medical decision making today:  Past Medical History:   Diagnosis Date    Cancer     right breast with mets to left hip     Chronic kidney disease, unspecified     Chronic kidney disease, unspecified     Coronary artery disease     Diabetes mellitus     Dizziness     Headache(784.0)     Hypertension     Hypothyroidism     Obesity     Otitis media     Renal disorder     CKD    Sleep apnea      Past Surgical History:   Procedure  Laterality Date    ANGIOGRAM, CORONARY, WITH LEFT HEART CATHETERIZATION  5/27/2021    Procedure: Angiogram, Coronary, with Left Heart Cath;  Surgeon: Luis Henderson MD;  Location: [facility] CATH;  Service: Cardiovascular;;    APPENDECTOMY      CARDIAC CATHETERIZATION      1 stent   in LAD    CHOLECYSTECTOMY      COLONOSCOPY      INSTANTANEOUS WAVE-FREE RATIO (IFR)  8/1/2023    Procedure: (IFR) RCA;  Surgeon: Luis Pichardo MD;  Location: [facility] CATH;  Service: Cardiovascular;;    LEFT HEART CATHETERIZATION Left 7/2/2019    Procedure: Left heart cath;  Surgeon: Mirela Ivan MD;  Location: [facility] CATH;  Service: Cardiology;  Laterality: Left;    LEFT HEART CATHETERIZATION Left 5/27/2021    Procedure: Left heart cath;  Surgeon: Luis Henderson MD;  Location: [facility] CATH;  Service: Cardiovascular;  Laterality: Left;    LEFT HEART CATHETERIZATION Left 9/20/2022    Procedure: Left heart cath;  Surgeon: Luis Henderson MD;  Location: [facility] CATH;  Service: Cardiovascular;  Laterality: Left;    LEFT HEART CATHETERIZATION  8/1/2023    Procedure: Left heart cath;  Surgeon: Luis Pichardo MD;  Location: [facility] CATH;  Service: Cardiovascular;;    MASTECTOMY Bilateral     with bilateral reconstruction CHERY flaps    RIGHT HEART CATHETERIZATION  8/1/2023    Procedure: Right heart cath;  Surgeon: Luis Pichardo MD;  Location: [facility] CATH;  Service: Cardiovascular;;     Family History   Problem Relation Age of Onset    Breast cancer Mother     Diabetes Mother     COPD Father     Heart disease Father     Prostate cancer Father      Social History     Substance and Sexual Activity   Alcohol Use No     Social History     Tobacco Use   Smoking Status Never   Smokeless Tobacco Never     Social History     Substance and Sexual Activity   Drug Use Never       Current Outpatient Medications:     albuterol (PROVENTIL/VENTOLIN HFA) 90 mcg/actuation inhaler, Inhale 1-2 puffs into the lungs every 6 (six) hours as  needed for Wheezing or Shortness of Breath (chest tightness). Rescue, Disp: 54 g, Rfl: 1    amLODIPine (NORVASC) 2.5 MG tablet, Take 1 tablet (2.5 mg total) by mouth nightly., Disp: 90 tablet, Rfl: 3    clopidogreL (PLAVIX) 75 mg tablet, TAKE 1 TABLET BY MOUTH ONCE DAILY, Disp: 90 tablet, Rfl: 3    cyanocobalamin, vitamin B-12, 1,000 mcg/15 mL Liqd, Take 1 tablet by mouth Daily., Disp: , Rfl:     denosumab (XGEVA) 120 mg/1.7 mL (70 mg/mL) Soln, Inject 120 mg into the skin once. Every 28 days, Disp: , Rfl:     evolocumab (REPATHA SURECLICK) 140 mg/mL PnIj, Inject 1 mL (140 mg total) into the skin every 14 (fourteen) days., Disp: 2 mL, Rfl: 11    glyBURIDE-metformin 5-500 mg (GLUCOVANCE) 5-500 mg Tab, Take 2 tablets by mouth 2 (two) times daily with meals., Disp: , Rfl: 0    letrozole (FEMARA) 2.5 mg Tab, Take 1 tablet (2.5 mg total) by mouth once daily., Disp: 90 tablet, Rfl: 3    liothyronine (CYTOMEL) 5 MCG Tab, Take 7.5 mcg by mouth after lunch., Disp: , Rfl:     metoprolol succinate (TOPROL-XL) 25 MG 24 hr tablet, Take 25 mg by mouth with lunch., Disp: , Rfl:     NON FORMULARY MEDICATION, Vitamin D oral drops (unsure of mg), Disp: , Rfl:     ondansetron (ZOFRAN) 4 MG tablet, , Disp: , Rfl:     ONETOUCH ULTRA TEST Strp, CHECK BLOOD GLUCOSE BEFORE MEALS AND QHS, Disp: , Rfl: 0    oxyCODONE-acetaminophen (PERCOCET)  mg per tablet, Take 1 tablet by mouth every 8 (eight) hours as needed for Pain., Disp: 90 tablet, Rfl: 0    palbociclib 125 mg Tab, Take 125 mg by mouth once daily. On days 1-21 and 7 days off every 28 days, Disp: 21 tablet, Rfl: 11    pantoprazole (PROTONIX) 20 MG tablet, Take 20 mg by mouth once daily. , Disp: , Rfl:     RESTASIS 0.05 % ophthalmic emulsion, Place 1 drop into both eyes 2 (two) times daily., Disp: , Rfl:     STIOLTO RESPIMAT 2.5-2.5 mcg/actuation Mist, INHALE 2 INHALATIONS BY MOUTH  INTO THE LUNGS ONCE DAILY  (CONTROLLER), Disp: 12 g, Rfl: 11    UNITHROID 88 mcg tablet, Take 88 mcg  by mouth every morning., Disp: , Rfl:     zinc gluconate 50 mg tablet, Take 50 mg by mouth once a week., Disp: , Rfl:     zolpidem (AMBIEN) 10 mg Tab, TK 1/2 TO 1 T PO QHS PRN ONLY, Disp: , Rfl: 0    aspirin (ECOTRIN) 81 MG EC tablet, Take 1 tablet (81 mg total) by mouth once daily., Disp: 360 tablet, Rfl: 0    pilocarpine (SALAGEN, PILOCARPINE,) 5 MG Tab, Take 1 tablet (5 mg total) by mouth 2 (two) times daily., Disp: 60 tablet, Rfl: 11    Current Facility-Administered Medications:     sodium chloride 0.9% flush 10 mL, 10 mL, Intravenous, PRN, Joanna Alfredo NP     Objective:         Vitals:    03/19/24 1111   BP: (!) 167/91   Pulse: 94     Physical Exam   Constitutional: normal appearance.   HENT:   Head: Normocephalic and atraumatic.   Nose: Nose normal.   Mouth/Throat: Mucous membranes are dry.   Eyes: Conjunctivae are normal.   Pulmonary/Chest: Effort normal and breath sounds normal.   Musculoskeletal:         General: Normal range of motion.      Cervical back: Normal range of motion.   Neurological: She is alert.   Skin: Skin is dry.   Flakiness of dry skin in arms, hands and legs  Red, scaly rash in R upper eye lid     Psychiatric: Mood normal.       Reviewed old and all outside pertinent medical records available.    All lab results personally reviewed and interpreted by me.  Lab Results   Component Value Date    WBC 2.30 (L) 02/09/2024    HGB 12.6 02/09/2024    HCT 35.6 (L) 02/09/2024     (H) 02/09/2024    MCH 38.0 (H) 02/09/2024    MCHC 35.4 02/09/2024    RDW 14.0 02/09/2024     02/09/2024    MPV 10.0 02/09/2024    NEUTROABS 4.1 08/11/2015    PLTEST Appears normal 08/07/2023       Lab Results   Component Value Date     02/09/2024    K 3.9 02/09/2024     02/09/2024    CO2 25 02/09/2024     (H) 02/09/2024    BUN 18 02/09/2024    CALCIUM 9.2 02/09/2024    PROT 6.6 02/09/2024    ALBUMIN 3.8 02/09/2024    BILITOT 0.6 02/09/2024    AST 29 02/09/2024    ALKPHOS 80 02/09/2024     "ALT 31 02/09/2024       Lab Results   Component Value Date    COLORU Yellow 12/01/2023    APPEARANCEUA Clear 12/01/2023    SPECGRAV 1.015 12/01/2023    PHUR 5.0 12/01/2023    PROTEINUA Negative 12/01/2023    KETONESU Negative 12/01/2023    LEUKOCYTESUR Negative 12/01/2023    NITRITE Negative 12/01/2023    UROBILINOGEN 0.2 12/01/2023       No results found for: "CRP"    No results found for: "SEDRATE", "ERYTHROCYTES"    No results found for: "ESPERANZA", "RF", "SEDRATE"    No components found for: "25OHVITDTOT", "02XFOBBS2", "65CJHOQH0", "METHODNOTE"    Lab Results   Component Value Date    URICACID 6.5 (H) 12/22/2023       Imaging:  All imaging reviewed and independently interpreted by me.         ASSESSMENT / PLAN:     Yasmin Verde is a 68 y.o. White female with:      1. Sicca syndrome with keratoconjunctivitis  - SSA and SSB negative- will test for other etiologies  - Explained to patient that will try and get more information - if there is a possibility that she could have ILD or diaphragmatic dysfunction could be 2.2 to S will send patient for lip biopsy  - For now will test and treat sicca symptoms with symptomatic treatment   - patient has been   - Start Pilocarpine BID   - CBC Auto Differential; Future  - Comprehensive Metabolic Panel; Future  - Sedimentation rate; Future  - C-Reactive Protein; Future  - Sjogrens syndrome-A extractable nuclear antibody; Future  - Sjogrens syndrome-B extractable nuclear antibody; Future  - Anti-Scleroderma Antibody; Future  - Anti-Neutrophilic Cytoplasmic Antibody; Future  - Anti-Histone Antibody; Future  - Anti-DNA Ab, Double-Stranded; Future  - Immunoglobulin G Subclass 4; Future  - Myeloperoxidase Antibody (MPO); Future  - MyoMarker Panel 3; Future  - Proteinase 3 Autoantibodies; Future  - Rheumatoid Factor; Future  - C3 Complement; Future  - C4 Complement; Future  - Cyclic Citrullinated Peptide Antibody, IgG; Future  - pilocarpine (SALAGEN, PILOCARPINE,) 5 MG Tab; Take 1 " tablet (5 mg total) by mouth 2 (two) times daily.  Dispense: 60 tablet; Refill: 11     2. ? ILD/diaphragmatic dysfunction   - Unclear if ILD since CT chest appears improved  - R hemidiaphragm dysfunction but unclear etiology   - Sjogren's can cause neurologic dysfunction which sometimes can cause paralysis of hemidiaphragm   - If no etiology for these causes- would get an EMG of diaphragm and lip biopsy to diagnosed- I really want to make sure patient needs further treatment because of comorbidity issues would avoid immunosuppression if possible.      3. Other specified counseling  - over 10 minutes spent regarding below topics:  - Immunization counseling done.  - Weight loss counseling done.  - Nutrition and exercise counseling.  - Limitation of alcohol consumption.  - Regular exercise:  Aerobic and resistance.  - Medication counseling provided.    4. Tobacco use  - at least 3 minutes spent on this topic  - smoking cessation encouraged.  - consider referral to smoking cessation clinic.    5. Obesity  - would benefit from decreasing at least 10% of body weight.  - recommended goal of losing 1 lb per week.  - consider nutritionist evaluation.  - would consider screening for LALO per PMD.    Follow up in about 8 weeks (around 5/14/2024).    Method of contact with patient concerns: [facility]     Time spent: 60 minutes in face to face discussion concerning diagnosis, prognosis, review of lab and test results, benefits of treatment as well as management of disease, counseling of patient and coordination of care between various health care providers.  Greater than half the time spent was used for coordination of care and counseling of patient.    Pop Rooney M.D.  Rheumatology Department   [facility]

## 2024-04-01 ENCOUNTER — TELEPHONE (OUTPATIENT)
Dept: NEPHROLOGY | Facility: CLINIC | Age: 69
End: 2024-04-01
Payer: COMMERCIAL

## 2024-04-01 NOTE — TELEPHONE ENCOUNTER
Patient sees [facility] on [address]. I called the lab and the patient already left. They will call patient and advise.

## 2024-04-01 NOTE — TELEPHONE ENCOUNTER
----- Message from Yamileth Hawk sent at 4/1/2024 12:51 PM CDT -----  Contact: Patient  Type:  Needs Medical Advice    Who Called:   Patient    Would the patient rather a call back or a response via [facility]?   Call back  Best Call Back Number:   [phone]    Additional Information:   States she is at [facility] on [address] to have blood work, but they do not have the orders - please fax orders to [phone] / phone is [phone] - please call and fax - states she does not have a phone on her, so please call the number at the Center - thank you

## 2024-05-09 NOTE — PLAN OF CARE
Problem: Adult Inpatient Plan of Care  Goal: Plan of Care Review  Outcome: Ongoing (interventions implemented as appropriate)  Pt tolerated tx well.schedule given to pt Pt educated to call Dr with any issues. Pt verbalized understanding. Pt adequate for discharge.         
Problem: Fall Injury Risk  Goal: Absence of Fall and Fall-Related Injury    Intervention: Identify and Manage Contributors to Fall Injury Risk  Pt receiving xgeva on shift. Pt continues with Vit D supplement no calcium  aware. Pt also educated not to get any invasive dental work within 3 months of injections        
Adequate: hears normal conversation without difficulty

## 2024-07-11 PROBLEM — R13.19 ESOPHAGEAL DYSPHAGIA: Status: ACTIVE | Noted: 2017-12-27

## 2024-07-11 PROBLEM — Z71.89 ACP (ADVANCE CARE PLANNING): Status: ACTIVE | Noted: 2024-07-11

## 2024-07-12 PROBLEM — N18.30 CKD (CHRONIC KIDNEY DISEASE) STAGE 3, GFR 30-59 ML/MIN: Status: ACTIVE | Noted: 2024-07-12

## 2024-08-09 ENCOUNTER — TELEPHONE (OUTPATIENT)
Dept: NEPHROLOGY | Facility: CLINIC | Age: 69
End: 2024-08-09
Payer: COMMERCIAL

## 2024-08-22 ENCOUNTER — OUTPATIENT CASE MANAGEMENT (OUTPATIENT)
Dept: ADMINISTRATIVE | Facility: OTHER | Age: 69
End: 2024-08-22
Payer: COMMERCIAL

## 2024-08-22 NOTE — LETTER
August 22, 2024             Dear Yasmin,    Welcome to [facility] Complex [facility].  It was a pleasure talking with you today.  My name is Marcie Duarte, and I look forward to being your Care Manager.  My goal is to help you function at the healthiest and highest level possible.  You can contact me directly at [phone].    As an [facility] patient, some of the services we may be able to provide include:     Development of an individualized care plan with a Registered Nurse   Connection with a   Connection with available resources and services    Coordinate communication among your care team members   Provide coaching and education   Help you understand your doctors treatment plan  Help you obtain information about your insurance coverage.     All services provided by [facility] Complex Care Managers and other care team members are coordinated with and communicated to your primary care team.      As part of your enrollment, you will be receiving education materials and more information about these services in your [facility] account, by phone or through the mail.  If you do not wish to participate or receive information, please contact our office at [phone].      Sincerely,        Marcie Duarte, RN  [facility]   Out-patient RN Complex Care Manager

## 2024-08-22 NOTE — PROGRESS NOTES
8/23/24 - OPCM RN contacted Mrs. Hoffman to complete OPCM program enrollment; she voiced this isn't a good time at the moment as her mother passed this morning while in hospice. Emotional support and empathy provided. Mrs. Hoffman agreed for OPCM RN follow up call in 2 weeks.

## 2024-09-09 ENCOUNTER — OUTPATIENT CASE MANAGEMENT (OUTPATIENT)
Dept: ADMINISTRATIVE | Facility: OTHER | Age: 69
End: 2024-09-09
Payer: COMMERCIAL

## 2024-09-10 NOTE — PROGRESS NOTES
9/10/2024  1st attempt to complete Initial Assessment  for Outpatient Care Management, left message.

## 2024-09-23 ENCOUNTER — OUTPATIENT CASE MANAGEMENT (OUTPATIENT)
Dept: ADMINISTRATIVE | Facility: OTHER | Age: 69
End: 2024-09-23
Payer: COMMERCIAL

## 2024-09-23 NOTE — PROGRESS NOTES
9/23/2024  2nd attempt to complete Initial Assessment  for Outpatient Care Management, left message.  Will send via portal -  unable to assess letter.

## 2024-10-03 ENCOUNTER — OUTPATIENT CASE MANAGEMENT (OUTPATIENT)
Dept: ADMINISTRATIVE | Facility: OTHER | Age: 69
End: 2024-10-03
Payer: COMMERCIAL

## 2024-10-04 ENCOUNTER — TELEPHONE (OUTPATIENT)
Dept: NEPHROLOGY | Facility: CLINIC | Age: 69
End: 2024-10-04
Payer: COMMERCIAL

## 2024-10-04 NOTE — TELEPHONE ENCOUNTER
----- Message from Margaret sent at 10/4/2024 10:02 AM CDT -----  Contact: Patient  Type:  Needs Medical Advice    Who Called: Patient    Would the patient rather a call back or a response via [facility]? Call    Best Call Back Number: [phone] (home)     Additional Information: Does Patient have labs to get completed prior to appt? Please advise

## 2024-10-04 NOTE — PROGRESS NOTES
Outpatient Care Management  Patient Does Not Consent    Patient: Yasmin Verde  MRN:  [id number]  Date of Service:  10/4/2024  Completed by:  Marcie Duarte RN    Chief Complaint   Patient presents with    Case Closure     Unable to reach.       Patient Summary      Unable to reach for completion of enrollment.

## 2024-10-11 ENCOUNTER — OFFICE VISIT (OUTPATIENT)
Dept: NEPHROLOGY | Facility: CLINIC | Age: 69
End: 2024-10-11
Payer: MEDICARE

## 2024-10-11 VITALS — OXYGEN SATURATION: 95 % | SYSTOLIC BLOOD PRESSURE: 136 MMHG | HEART RATE: 77 BPM | DIASTOLIC BLOOD PRESSURE: 70 MMHG

## 2024-10-11 DIAGNOSIS — N18.32 STAGE 3B CHRONIC KIDNEY DISEASE: Primary | ICD-10-CM

## 2024-10-11 DIAGNOSIS — Z85.3 HISTORY OF BREAST CANCER: ICD-10-CM

## 2024-10-11 DIAGNOSIS — I10 ESSENTIAL HYPERTENSION: ICD-10-CM

## 2024-10-11 DIAGNOSIS — E66.812 CLASS 2 OBESITY WITH BODY MASS INDEX (BMI) OF 35.0 TO 35.9 IN ADULT, UNSPECIFIED OBESITY TYPE, UNSPECIFIED WHETHER SERIOUS COMORBIDITY PRESENT: ICD-10-CM

## 2024-10-11 DIAGNOSIS — I25.10 CORONARY ARTERY DISEASE DUE TO CALCIFIED CORONARY LESION: ICD-10-CM

## 2024-10-11 DIAGNOSIS — I25.84 CORONARY ARTERY DISEASE DUE TO CALCIFIED CORONARY LESION: ICD-10-CM

## 2024-10-11 DIAGNOSIS — E78.00 PURE HYPERCHOLESTEROLEMIA: ICD-10-CM

## 2024-10-11 PROCEDURE — 99213 OFFICE O/P EST LOW 20 MIN: CPT | Mod: PBBFAC,PN | Performed by: STUDENT IN AN ORGANIZED HEALTH CARE EDUCATION/TRAINING PROGRAM

## 2024-10-11 PROCEDURE — 99999 PR PBB SHADOW E&M-EST. PATIENT-LVL III: CPT | Mod: PBBFAC,,, | Performed by: STUDENT IN AN ORGANIZED HEALTH CARE EDUCATION/TRAINING PROGRAM

## 2024-10-11 PROCEDURE — 99204 OFFICE O/P NEW MOD 45 MIN: CPT | Mod: S$PBB,,, | Performed by: STUDENT IN AN ORGANIZED HEALTH CARE EDUCATION/TRAINING PROGRAM

## 2024-10-11 RX ORDER — SUCRALFATE 1 G/1
1 TABLET ORAL 4 TIMES DAILY
COMMUNITY
Start: 2024-10-03

## 2024-10-11 NOTE — PROGRESS NOTES
Subjective:       Patient ID: Yasmin Verde is a 68 y.o. female who presents for new patient evaluation for chronic renal failure.    Yasmin Verde is referred by Freida Warren MD to be evaluated for chronic renal failure.  She was previously followed by me for chronic kidney disease management under a different practice setting.  She has ongoing medical conditions of coronary artery disease status PCI times 10, hypothyroidism, right breast cancer metastatic to left hip, hypertension, chronic esophageal stricture with history of dilations, diabetes type 2 not a long-term use of insulin, LALO on CPAP, restrictive lung disease, sicca syndrome, and chronic kidney disease stage IIIB.  We reviewed her recent lab trends at chair side.  Last chemistry panel was from 09/16/2024 and featured a serum creatinine 1.8 mg/dL which is consistent with her trends over the last 12 months.    Pulmonology: Mark  Cardiology: Beatriz  Oncology: Serafin    Interval history:  10/11/24 - here today to establish continued CKD care. Continues to follow with oncology for regular checks. She reports stable trends of her parameters and PET scan every 6 months.   She has chronic complaints of dry mouth - rx diagnosed with Sicca syndrome. Reviewed medication list with patient.  She has no uremic or urinary symptoms and is in her usual state of health.    Labs reviewed with patient at chairside.         Review of Systems   Constitutional:  Negative for chills, diaphoresis and fever.   Respiratory:  Negative for cough and shortness of breath.    Cardiovascular:  Negative for chest pain and leg swelling.   Gastrointestinal:  Negative for abdominal pain, diarrhea, nausea and vomiting.   Genitourinary:  Negative for difficulty urinating, dysuria and hematuria.   Musculoskeletal:  Negative for myalgias.   Neurological:  Negative for headaches.   Hematological:  Does not bruise/bleed easily.       The past medical, family and social  histories were reviewed for this encounter.     Past Medical History:   Diagnosis Date    Cancer     right breast with mets to left hip     Chronic kidney disease, unspecified     Chronic kidney disease, unspecified     Coronary artery disease     Diabetes mellitus     Dizziness     Headache(784.0)     Hypertension     Hypothyroidism     Obesity     Otitis media     Renal disorder     CKD    Sleep apnea      Past Surgical History:   Procedure Laterality Date    ANGIOGRAM, CORONARY, WITH LEFT HEART CATHETERIZATION  5/27/2021    Procedure: Angiogram, Coronary, with Left Heart Cath;  Surgeon: Luis Henderson MD;  [facility];  Service: Cardiovascular;;    APPENDECTOMY      CARDIAC CATHETERIZATION      1 stent   in LAD    CHOLECYSTECTOMY      COLONOSCOPY      ESOPHAGOGASTRODUODENOSCOPY N/A 7/11/2024    Procedure: EGD (ESOPHAGOGASTRODUODENOSCOPY);  Surgeon: Frantz Loaiza MD;  Location: [facility];  Service: Endoscopy;  Laterality: N/A;    INSTANTANEOUS WAVE-FREE RATIO (IFR)  8/1/2023    Procedure: (IFR) RCA;  Surgeon: Luis Pichardo MD;  Location: [facility];  Service: Cardiovascular;;    LEFT HEART CATHETERIZATION Left 7/2/2019    Procedure: Left heart cath;  Surgeon: Mirela Ivan MD;  Location: [facility];  Service: Cardiology;  Laterality: Left;    LEFT HEART CATHETERIZATION Left 5/27/2021    Procedure: Left heart cath;  Surgeon: Luis Henderson MD;  Location: [facility];  Service: Cardiovascular;  Laterality: Left;    LEFT HEART CATHETERIZATION Left 9/20/2022    Procedure: Left heart cath;  Surgeon: Luis Henderson MD;  Location: [facility];  Service: Cardiovascular;  Laterality: Left;    LEFT HEART CATHETERIZATION  8/1/2023    Procedure: Left heart cath;  Surgeon: Luis Pichardo MD;  Location: [facility] CATH;  Service: Cardiovascular;;    MASTECTOMY Bilateral     with bilateral reconstruction CHERY flaps    RIGHT HEART CATHETERIZATION  8/1/2023    Procedure: Right heart cath;   Surgeon: Luis Pichardo MD;  Location: [facility];  Service: Cardiovascular;;     Social History     Socioeconomic History    Marital status:    Tobacco Use    Smoking status: Never    Smokeless tobacco: Never   Substance and Sexual Activity    Alcohol use: No    Drug use: Never     Social Drivers of Health     Financial Resource Strain: Low Risk  (2/20/2024)    Overall Financial Resource Strain (CARDIA)     Difficulty of Paying Living Expenses: Not very hard   Food Insecurity: No Food Insecurity (7/12/2024)    Hunger Vital Sign     Worried About Running Out of Food in the Last Year: Never true     Ran Out of Food in the Last Year: Never true   Transportation Needs: No Transportation Needs (7/12/2024)    TRANSPORTATION NEEDS     Transportation : No   Physical Activity: Unknown (2/20/2024)    Exercise Vital Sign     Days of Exercise per Week: 0 days   Stress: Stress Concern Present (2/20/2024)    [employer] - Occupational Stress Questionnaire     Feeling of Stress : To some extent   Housing Stability: Low Risk  (7/12/2024)    Housing Stability Vital Sign     Unable to Pay for Housing in the Last Year: No     Homeless in the Last Year: No     Current Outpatient Medications   Medication Sig    albuterol (PROVENTIL/VENTOLIN HFA) 90 mcg/actuation inhaler Inhale 1-2 puffs into the lungs every 6 (six) hours as needed for Wheezing or Shortness of Breath (chest tightness). Rescue    amLODIPine (NORVASC) 5 MG tablet Take 1 tablet (5 mg total) by mouth once daily.    aspirin (ECOTRIN) 81 MG EC tablet Take 81 mg by mouth after lunch.    cyanocobalamin 500 MCG tablet Take 1,000 mcg by mouth Daily.    glyBURIDE-metformin 5-500 mg (GLUCOVANCE) 5-500 mg Tab Take 1 tablet by mouth 2 (two) times daily with meals.    letrozole (FEMARA) 2.5 mg Tab Take 1 tablet (2.5 mg total) by mouth once daily.    liothyronine (CYTOMEL) 5 MCG Tab Take 10 mcg by mouth every evening.    metoprolol  succinate (TOPROL-XL) 25 MG 24 hr tablet Take 25 mg by mouth after lunch.    ondansetron (ZOFRAN) 4 MG tablet Take 4 mg by mouth daily as needed for Nausea.    ONETOUCH ULTRA TEST Strp 1 strip by Misc.(Non-Drug; Combo Route) route once daily. To check blood glucose    oxyCODONE-acetaminophen (PERCOCET)  mg per tablet Take 1 tablet by mouth every 8 (eight) hours as needed for Pain.    palbociclib 125 mg Tab Take 125 mg by mouth once daily. On days 1-21 and 7 days off every 28 days    pantoprazole (PROTONIX) 20 MG tablet Take 20 mg by mouth 2 (two) times daily before meals.    tiotropium-olodateroL (STIOLTO RESPIMAT) 2.5-2.5 mcg/actuation Mist Inhale 2 puffs into the lungs once daily. ControllerINHALE 2 INHALATIONS BY MOUTH  INTO THE LUNGS ONCE DAILY  (CONTROLLER)    traZODone (DESYREL) 100 MG tablet Take 0.5 tablets (50 mg total) by mouth every evening.    UNITHROID 75 mcg tablet Take 75 mcg by mouth before breakfast.    ZINC GLUCONATE ORAL Take 50 mg by mouth 3 (three) times a week.     No current facility-administered medications for this visit.         Objective:   /70   Pulse 77   SpO2 95%      Physical Exam  Vitals reviewed.   Constitutional:       General: She is not in acute distress.     Appearance: Normal appearance.   HENT:      Head: Normocephalic and atraumatic.   Eyes:      General: No scleral icterus.     Extraocular Movements: Extraocular movements intact.   Cardiovascular:      Rate and Rhythm: Normal rate and regular rhythm.      Pulses: Normal pulses.      Heart sounds: Normal heart sounds.      No friction rub.   Pulmonary:      Effort: Pulmonary effort is normal. No respiratory distress.      Breath sounds: Normal breath sounds.   Abdominal:      General: Abdomen is flat.      Palpations: Abdomen is soft.   Musculoskeletal:         General: No swelling.      Cervical back: Normal range of motion. No tenderness.      Right lower leg: No edema.      Left lower leg: No edema.    Neurological:      General: No focal deficit present.      Mental Status: She is oriented to person, place, and time.      Motor: No weakness.   Psychiatric:         Mood and Affect: Mood normal.         Behavior: Behavior normal.         Assessment:     Lab Results   Component Value Date    CREATININE 1.82 (H) 09/16/2024    BUN 21 (H) 09/16/2024     (L) 09/16/2024    K 4.1 09/16/2024     09/16/2024    CO2 23 09/16/2024     Lab Results   Component Value Date    PTH 14.2 04/02/2024    CALCIUM 9.6 09/16/2024    PHOS 4.2 04/02/2024     Lab Results   Component Value Date    HCT 35.7 (L) 09/16/2024     Prot/Creat Ratio, Urine   Date Value Ref Range Status   12/01/2023 130.1 (H) 10.0 - 107.0 mg/g Final   08/22/2023 51.8 10.0 - 107.0 mg/g Final   05/05/2023 121.5 (H) 10.0 - 107.0 mg/g Final       Lab Results   Component Value Date    MICALBCREAT 11.4 12/01/2023    MICALBCREAT 8.6 08/22/2023    MICALBCREAT 15.9 05/05/2023           1. Stage 3b chronic kidney disease    2. Essential hypertension    3. History of breast cancer    4. Coronary artery disease, multiple stents, mid LAD 3.5 X 40 ORSIRO RONALD,7/19    5. Pure hypercholesterolemia    6. Class 2 obesity with body mass index (BMI) of 35.0 to 35.9 in adult, unspecified obesity type, unspecified whether serious comorbidity present        Plan:   Return to clinic in 4 months  Labs for next visit include uacr, upcr, ua, pth, cbc, rfp.  Baseline creatinine is 1.6-1.9mg/dL.    CKD stage GIIIB/ A1  -renal fxn stable lately. Former renal decline believed related to uncontrolled DM and oncology therapy  -not on RASI d/t prior hypotensive events. Currently improved BP trends though low proteinuria. Believed white coat hypertension as has low bp recordings at home.   -avoiding SGLT2-I d/t history of yeast infections.   -low risk of renal progression based on KFRE model (discussed that this model does not factor uncontrolled DM)    Hypertension - BP controlled on  current regiment. Possible white coat hypertension. Discussed home BP monitoring.     DM type II, uncontrolled - f/u with PCP for continued titration. Discussed that this is currently one of her biggest modifiable risk factors for CKD progression.    MBD evaluation - previously had h/o hypercalcemia d/t vit d tox. Improved recently    Breast cancer - followed by dr. Betancourt. Tx w/ letrozole and Ibrance (palbociclib)    Anemia of CKD/chronic disease - followed by oncology    James Ireland MD  [facility]    Part of this note has been created using [employer] dictation system. Errors in transcription may not be completely avoided.    KFRE 2-Year: 1.1% at 10/17/2024 11:51 AM  Calculated from:  Serum Creatinine: 1.72 mg/dL at 10/17/2024 11:51 AM  Urine Albumin Creatinine Ratio: 11.4 ug/mg at 12/1/2023  1:45 PM  Age: 68 years  Sex: Female at 10/17/2024 11:51 AM  Has CKD-3 to CKD-5: Yes    KFRE 5-Year: 3.5% at 10/17/2024 11:51 AM  Calculated from:  Serum Creatinine: 1.72 mg/dL at 10/17/2024 11:51 AM  Urine Albumin Creatinine Ratio: 11.4 ug/mg at 12/1/2023  1:45 PM  Age: 68 years  Sex: Female at 10/17/2024 11:51 AM  Has CKD-3 to CKD-5: Yes

## 2024-11-07 ENCOUNTER — OFFICE VISIT (OUTPATIENT)
Dept: RHEUMATOLOGY | Facility: CLINIC | Age: 69
End: 2024-11-07
Payer: MEDICARE

## 2024-11-07 VITALS
WEIGHT: 215.38 LBS | BODY MASS INDEX: 36.77 KG/M2 | HEART RATE: 70 BPM | DIASTOLIC BLOOD PRESSURE: 73 MMHG | SYSTOLIC BLOOD PRESSURE: 154 MMHG | RESPIRATION RATE: 18 BRPM | OXYGEN SATURATION: 96 % | HEIGHT: 64 IN | TEMPERATURE: 98 F

## 2024-11-07 DIAGNOSIS — M35.01 SICCA SYNDROME WITH KERATOCONJUNCTIVITIS: Primary | ICD-10-CM

## 2024-11-07 DIAGNOSIS — Z71.89 COUNSELING AND COORDINATION OF CARE: ICD-10-CM

## 2024-11-07 DIAGNOSIS — K22.89 PATULOUS LOWER ESOPHAGEAL SPHINCTER: ICD-10-CM

## 2024-11-07 DIAGNOSIS — E66.01 CLASS 2 SEVERE OBESITY WITH SERIOUS COMORBIDITY AND BODY MASS INDEX (BMI) OF 36.0 TO 36.9 IN ADULT, UNSPECIFIED OBESITY TYPE: ICD-10-CM

## 2024-11-07 DIAGNOSIS — E66.812 CLASS 2 SEVERE OBESITY WITH SERIOUS COMORBIDITY AND BODY MASS INDEX (BMI) OF 36.0 TO 36.9 IN ADULT, UNSPECIFIED OBESITY TYPE: ICD-10-CM

## 2024-11-07 PROCEDURE — 99215 OFFICE O/P EST HI 40 MIN: CPT | Mod: PBBFAC,PO | Performed by: STUDENT IN AN ORGANIZED HEALTH CARE EDUCATION/TRAINING PROGRAM

## 2024-11-07 PROCEDURE — 99215 OFFICE O/P EST HI 40 MIN: CPT | Mod: S$PBB,,, | Performed by: STUDENT IN AN ORGANIZED HEALTH CARE EDUCATION/TRAINING PROGRAM

## 2024-11-07 PROCEDURE — 99999 PR PBB SHADOW E&M-EST. PATIENT-LVL V: CPT | Mod: PBBFAC,,, | Performed by: STUDENT IN AN ORGANIZED HEALTH CARE EDUCATION/TRAINING PROGRAM

## 2024-11-07 NOTE — PROGRESS NOTES
RHEUMATOLOGY OUTPATIENT CLINIC NOTE    11/10/2024    Attending Rheumatologist: Pop Rooney  Primary Care Provider: Freida Warren MD   Physician Requesting Consultation: No referring provider defined for this encounter.  Chief Complaint/Reason For Consultation:  Sicca syndrome with keratoconjunctivitis      Subjective:       HPI  Yasmin Verde is a 68 y.o. White female with a very extensive and complex medical history including CAD with PCI x 10 (Dr. Henderson - [facility] for pulm htn - negative), R breast cancer in 2010 with mets to L hip (Dr. Betancourt) on Ibrance for 8 years(kinase inhibitor), and letrazol (she had lumpectomy on R and XRT with Dr. Lockett), HTN, CKD, esophageal strictures (been dilated several times), DM2 and LALO (not been able to tolerate due to dry mouth.  Patient was referred due to extremely dry mouth and eyes that started 1 year ago. Patient reports that it is hard to eat, palate gets stuck in roof of mouth due to dryness, having problems with teeth, cannot use CPAP from dry mouth and this is really disturbing for her. She also has dry eyes and skin- you can see flaking from arms/hands and legs.     She also is having problems with diaphragm- CT revealed elevated R hemidiaphragm with diaphragm dysfunction. PFTs 11/2023 - restrictive pattern.    CT Chest of 8/2023 revelas GGOs but recent CT chest makes no mention of these. She follows with Dr Nathalie Flores.     11/8/2024:  Patient here for follow up of dry mouth syndrome ? SjS  Reports that hasn't had much improvement with Piloc[place]ine and has Cevimeline but never started it     Review of Systems   Constitutional:  Negative for appetite change, chills, fatigue, fever and unexpected weight change.   HENT:  Negative for nasal congestion, ear discharge, ear pain, hearing loss, mouth sores, nosebleeds, sneezing, sore throat, tinnitus and trouble swallowing.    Eyes:  Negative for photophobia, pain, discharge, redness, itching  and visual disturbance.   Respiratory:  Negative for cough, chest tightness, shortness of breath and wheezing.    Cardiovascular:  Negative for chest pain, palpitations and leg swelling.   Gastrointestinal:  Negative for abdominal distention, abdominal pain, blood in stool, constipation, diarrhea, nausea and vomiting.   Endocrine: Negative for cold intolerance, heat intolerance, polydipsia, polyphagia and polyuria.   Genitourinary:  Negative for difficulty urinating, dyspareunia, dysuria, flank pain, frequency, genital sores, hematuria, menstrual problem, pelvic pain, urgency, vaginal bleeding, vaginal discharge, vaginal pain and vaginal dryness.   Musculoskeletal:  Negative for arthralgias, back pain, gait problem, joint swelling, leg pain, myalgias, neck pain, neck stiffness and joint deformity.   Integumentary:  Negative for pallor and rash.   Neurological:  Negative for dizziness, seizures, weakness, light-headedness, numbness and headaches.   Hematological:  Negative for adenopathy. Does not bruise/bleed easily.   Psychiatric/Behavioral:  Negative for confusion, decreased concentration and sleep disturbance. The patient is not nervous/anxious.    All other systems reviewed and are negative.     Answers submitted by the patient for this visit:  Rheumatology Questionnaire (Submitted on 3/18/2024)  fever: No  eye redness: No  mouth sores: No  headaches: Yes  shortness of breath: Yes  chest pain: No  trouble swallowing: Yes  diarrhea: Yes  constipation: No  unexpected weight change: No  genital sore: No  dysuria: No  During the last 3 days, have you had a skin rash?: Yes  Bruises or bleeds easily: Yes  cough: No  Chronic comorbid conditions affecting medical decision making today:  Past Medical History:   Diagnosis Date    Cancer     right breast with mets to left hip     Chronic kidney disease, unspecified     Chronic kidney disease, unspecified     Coronary artery disease     Diabetes mellitus     Dizziness      Headache(784.0)     Hypertension     Hypothyroidism     Obesity     Otitis media     Renal disorder     CKD    Sleep apnea      Past Surgical History:   Procedure Laterality Date    ANGIOGRAM, CORONARY, WITH LEFT HEART CATHETERIZATION  5/27/2021    Procedure: Angiogram, Coronary, with Left Heart Cath;  Surgeon: Luis Henderson MD;  [facility];  Service: Cardiovascular;;    APPENDECTOMY      CARDIAC CATHETERIZATION      1 stent   in LAD    CHOLECYSTECTOMY      COLONOSCOPY      ESOPHAGOGASTRODUODENOSCOPY N/A 7/11/2024    Procedure: EGD (ESOPHAGOGASTRODUODENOSCOPY);  Surgeon: Frantz Loaiza MD;  Location: [facility] ENDO;  Service: Endoscopy;  Laterality: N/A;    INSTANTANEOUS WAVE-FREE RATIO (IFR)  8/1/2023    Procedure: (IFR) RCA;  Surgeon: Luis Pichardo MD;  Location: [facility] CATH;  Service: Cardiovascular;;    LEFT HEART CATHETERIZATION Left 7/2/2019    Procedure: Left heart cath;  Surgeon: Mirela Ivan MD;  [facility];  Service: Cardiology;  Laterality: Left;    LEFT HEART CATHETERIZATION Left 5/27/2021    Procedure: Left heart cath;  Surgeon: Luis Henderson MD;  Location: [facility];  Service: Cardiovascular;  Laterality: Left;    LEFT HEART CATHETERIZATION Left 9/20/2022    Procedure: Left heart cath;  Surgeon: Luis Henderson MD;  [facility];  Service: Cardiovascular;  Laterality: Left;    LEFT HEART CATHETERIZATION  8/1/2023    Procedure: Left heart cath;  Surgeon: Luis Pichardo MD;  [facility];  Service: Cardiovascular;;    MASTECTOMY Bilateral     with bilateral reconstruction CHERY flaps    RIGHT HEART CATHETERIZATION  8/1/2023    Procedure: Right heart cath;  Surgeon: Luis Pichardo MD;  [facility];  Service: Cardiovascular;;     Family History   Problem Relation Name Age of Onset    Breast cancer Mother      Diabetes Mother      COPD Father      Heart disease Father      Prostate cancer Father       Social History      Substance and Sexual Activity   Alcohol Use No     Social History     Tobacco Use   Smoking Status Never   Smokeless Tobacco Never     Social History     Substance and Sexual Activity   Drug Use Never       Current Outpatient Medications:     albuterol (PROVENTIL/VENTOLIN HFA) 90 mcg/actuation inhaler, Inhale 1-2 puffs into the lungs every 6 (six) hours as needed for Wheezing or Shortness of Breath (chest tightness). Rescue, Disp: 54 g, Rfl: 1    amLODIPine (NORVASC) 5 MG tablet, Take 1 tablet (5 mg total) by mouth once daily., Disp: 90 tablet, Rfl: 3    aspirin (ECOTRIN) 81 MG EC tablet, Take 81 mg by mouth after lunch., Disp: , Rfl:     cyanocobalamin 500 MCG tablet, Take 1,000 mcg by mouth Daily. (Patient taking differently: Take 1,000 mcg by mouth Daily. Not everyday), Disp: , Rfl:     glyBURIDE-metformin 5-500 mg (GLUCOVANCE) 5-500 mg Tab, Take 1 tablet by mouth 2 (two) times daily with meals., Disp: 60 tablet, Rfl: 3    letrozole (FEMARA) 2.5 mg Tab, Take 1 tablet (2.5 mg total) by mouth once daily., Disp: 90 tablet, Rfl: 3    liothyronine (CYTOMEL) 5 MCG Tab, Take 10 mcg by mouth every evening., Disp: , Rfl:     metoprolol succinate (TOPROL-XL) 25 MG 24 hr tablet, Take 25 mg by mouth after lunch., Disp: , Rfl:     ondansetron (ZOFRAN) 4 MG tablet, Take 4 mg by mouth daily as needed for Nausea., Disp: , Rfl:     ONETOUCH ULTRA TEST Strp, 1 strip by Misc.(Non-Drug; Combo Route) route once daily. To check blood glucose, Disp: , Rfl: 0    oxyCODONE-acetaminophen (PERCOCET)  mg per tablet, Take 1 tablet by mouth every 8 (eight) hours as needed for Pain., Disp: 90 tablet, Rfl: 0    palbociclib 125 mg Tab, Take 125 mg by mouth once daily. On days 1-21 and 7 days off every 28 days, Disp: 21 tablet, Rfl: 11    pantoprazole (PROTONIX) 20 MG tablet, Take 20 mg by mouth 2 (two) times daily before meals., Disp: , Rfl:     sucralfate (CARAFATE) 1 gram tablet, Take 1 g by mouth 4 (four) times daily., Disp: , Rfl:      tiotropium-olodateroL (STIOLTO RESPIMAT) 2.5-2.5 mcg/actuation Mist, Inhale 2 puffs into the lungs once daily. ControllerINHALE 2 INHALATIONS BY MOUTH  INTO THE LUNGS ONCE DAILY  (CONTROLLER), Disp: 12 g, Rfl: 6    UNITHROID 75 mcg tablet, Take 75 mcg by mouth before breakfast., Disp: , Rfl:     ZINC GLUCONATE ORAL, Take 50 mg by mouth 3 (three) times a week., Disp: , Rfl:      Objective:         Vitals:    11/07/24 1454   BP: (!) 154/73   Pulse: 70   Resp: 18   Temp: 97.5 °F (36.4 °C)     Physical Exam   Constitutional: normal appearance.   HENT:   Head: Normocephalic and atraumatic.   Nose: Nose normal.   Mouth/Throat: Mucous membranes are dry.   Eyes: Conjunctivae are normal.   Pulmonary/Chest: Effort normal and breath sounds normal.   Musculoskeletal:         General: Normal range of motion.      Cervical back: Normal range of motion.   Neurological: She is alert.   Skin: Skin is dry.   Flakiness of dry skin in arms, hands and legs  Red, scaly rash in R upper eye lid     Psychiatric: Mood normal.       Reviewed old and all outside pertinent medical records available.    All lab results personally reviewed and interpreted by me.  Lab Results   Component Value Date    WBC 2.46 (L) 10/17/2024    WBC 2.46 (L) 10/17/2024    HGB 12.9 10/17/2024    HGB 12.9 10/17/2024    HCT 37.1 10/17/2024    HCT 37.1 10/17/2024     (H) 10/17/2024     (H) 10/17/2024    MCH 37.1 (H) 10/17/2024    MCH 37.1 (H) 10/17/2024    MCHC 34.8 10/17/2024    MCHC 34.8 10/17/2024    RDW 13.7 10/17/2024    RDW 13.7 10/17/2024     10/17/2024     10/17/2024    MPV 9.8 10/17/2024    MPV 9.8 10/17/2024    NEUTROABS 4.1 08/11/2015    PLTEST Appears normal 07/12/2024       Lab Results   Component Value Date     (L) 10/17/2024     (L) 10/17/2024    K 4.1 10/17/2024    K 4.1 10/17/2024    CL 99 10/17/2024    CL 99 10/17/2024    CO2 28 10/17/2024    CO2 28 10/17/2024     (H) 10/17/2024     (H)  "10/17/2024    BUN 22 (H) 10/17/2024    BUN 22 (H) 10/17/2024    CALCIUM 9.9 10/17/2024    CALCIUM 9.9 10/17/2024    PROT 6.8 10/17/2024    PROT 6.8 10/17/2024    ALBUMIN 3.8 10/17/2024    ALBUMIN 3.8 10/17/2024    BILITOT 0.6 10/17/2024    BILITOT 0.6 10/17/2024    AST 22 10/17/2024    AST 22 10/17/2024    ALKPHOS 92 10/17/2024    ALKPHOS 92 10/17/2024    ALT 18 10/17/2024    ALT 18 10/17/2024       Lab Results   Component Value Date    COLORU Yellow 04/04/2024    APPEARANCEUA Clear 04/04/2024    SPECGRAV 1.010 04/04/2024    PHUR 5.5 04/04/2024    PROTEINUA Negative 04/04/2024    KETONESU Negative 04/04/2024    LEUKOCYTESUR 1+ (A) 04/04/2024    NITRITE Negative 04/04/2024    UROBILINOGEN 0.2 04/04/2024       Lab Results   Component Value Date    CRP 2.60 (H) 03/20/2024       Lab Results   Component Value Date    SEDRATE 38 (H) 03/20/2024       Lab Results   Component Value Date    RF <8.6 03/20/2024    SEDRATE 38 (H) 03/20/2024       No components found for: "25OHVITDTOT", "85SMQYMU8", "58ICJDFU3", "METHODNOTE"    Lab Results   Component Value Date    URICACID 5.2 10/17/2024       Imaging:  All imaging reviewed and independently interpreted by me.         ASSESSMENT / PLAN:     Yasmin Verde is a 68 y.o. White female with:      1. Sicca syndrome with keratoconjunctivitis  - SSA and SSB negative- will test for other etiologies  - Explained to patient that will try and get more information - if there is a possibility that she could have ILD or diaphragmatic dysfunction could be 2.2 to S will send patient for lip biopsy  - For now will test and treat sicca symptoms with symptomatic treatment   - patient has been   - Start Cevimeline if does not help due swish and spit explained in patient's instructions        2. ? ILD/diaphragmatic dysfunction   - Unclear if ILD since CT chest appears improved  - R hemidiaphragm dysfunction but unclear etiology   - Sjogren's can cause neurologic dysfunction which sometimes can " cause paralysis of hemidiaphragm   - If no etiology for these causes- would get an EMG of diaphragm and lip biopsy to diagnosed- I really want to make sure patient needs further treatment because of comorbidity issues would avoid immunosuppression if possible.      3. Other specified counseling  - over 10 minutes spent regarding below topics:  - Immunization counseling done.  - Weight loss counseling done.  - Nutrition and exercise counseling.  - Limitation of alcohol consumption.  - Regular exercise:  Aerobic and resistance.  - Medication counseling provided.      4. Obesity  - would benefit from decreasing at least 10% of body weight.  - recommended goal of losing 1 lb per week.  - consider nutritionist evaluation.    Follow up in about 3 months (around 2/7/2025).    Method of contact with patient concerns: [facility]     Time spent: 60 minutes in face to face discussion concerning diagnosis, prognosis, review of lab and test results, benefits of treatment as well as management of disease, counseling of patient and coordination of care between various health care providers.  Greater than half the time spent was used for coordination of care and counseling of patient.    Pop Rooney M.D.  Rheumatology Department   [facility]

## 2024-11-07 NOTE — PATIENT INSTRUCTIONS
"SalivaSure       A lower dose is prepared by dissolving the desired fraction of a 30 mg capsule's contents in water. The desired fraction can be taken to achieve the desired reduced dose, or the solution can be used in a "rinse-and-spit" regimen to minimize systemic absorption     Pilocarpine oral rinse has been shown to increase salivary flow and relieve dry mouth symptoms in a small study of 19 older adult patients [58]. Systemic side effects were less prevalent than in a clinical trial of oral pilocarpine tablets (5 mg three times daily). It is compounded by dissolving three 5 mg tablets in 150 mL of water. Patients held the rinse in their mouth for two minutes before spitting it out and were allowed to use up to 150 mL of the rinse per day. Similarly, the application of topical ocular pilocarpine drops to the oral cavity (2 to 3 drops of a 4% ocular pilocarpine solution is equivalent to 4 to 6 mg) may facilitate dose titration in an effort to avoid the systemic side effects of oral pilocarpine tablets [59].   "

## 2025-02-19 PROBLEM — Z71.89 ACP (ADVANCE CARE PLANNING): Status: RESOLVED | Noted: 2024-07-11 | Resolved: 2025-02-19

## 2025-03-11 ENCOUNTER — TELEPHONE (OUTPATIENT)
Dept: NEPHROLOGY | Facility: CLINIC | Age: 70
End: 2025-03-11
Payer: MEDICARE

## 2025-03-11 NOTE — TELEPHONE ENCOUNTER
Called and spoke to patient. Confirmed patient needed labs prior to appointment. Patient going to [facility] on [facility] today.

## 2025-03-11 NOTE — TELEPHONE ENCOUNTER
----- Message from Ave sent at 3/11/2025  2:52 PM CDT -----  Contact: self  Type: Needs Medical AdviceWho Called:  the patient Best Call Back Number: [phone] Information: pt called about appt tomorrow, no one told her to do labs. Is it ok for her to come to her appt? Must she have labs done first? Can someone call her to let her know?

## 2025-03-12 ENCOUNTER — OFFICE VISIT (OUTPATIENT)
Dept: NEPHROLOGY | Facility: CLINIC | Age: 70
End: 2025-03-12
Payer: MEDICARE

## 2025-03-12 VITALS
SYSTOLIC BLOOD PRESSURE: 130 MMHG | DIASTOLIC BLOOD PRESSURE: 70 MMHG | WEIGHT: 218.06 LBS | HEIGHT: 64 IN | OXYGEN SATURATION: 96 % | BODY MASS INDEX: 37.23 KG/M2 | HEART RATE: 85 BPM

## 2025-03-12 DIAGNOSIS — E66.812 CLASS 2 OBESITY WITH BODY MASS INDEX (BMI) OF 35.0 TO 35.9 IN ADULT, UNSPECIFIED OBESITY TYPE, UNSPECIFIED WHETHER SERIOUS COMORBIDITY PRESENT: ICD-10-CM

## 2025-03-12 DIAGNOSIS — I25.10 CORONARY ARTERY DISEASE DUE TO CALCIFIED CORONARY LESION: ICD-10-CM

## 2025-03-12 DIAGNOSIS — N18.32 STAGE 3B CHRONIC KIDNEY DISEASE: Primary | ICD-10-CM

## 2025-03-12 DIAGNOSIS — E66.01 CLASS 2 SEVERE OBESITY WITH SERIOUS COMORBIDITY AND BODY MASS INDEX (BMI) OF 36.0 TO 36.9 IN ADULT, UNSPECIFIED OBESITY TYPE: ICD-10-CM

## 2025-03-12 DIAGNOSIS — I25.84 CORONARY ARTERY DISEASE DUE TO CALCIFIED CORONARY LESION: ICD-10-CM

## 2025-03-12 DIAGNOSIS — E78.00 PURE HYPERCHOLESTEROLEMIA: ICD-10-CM

## 2025-03-12 DIAGNOSIS — E66.812 CLASS 2 SEVERE OBESITY WITH SERIOUS COMORBIDITY AND BODY MASS INDEX (BMI) OF 36.0 TO 36.9 IN ADULT, UNSPECIFIED OBESITY TYPE: ICD-10-CM

## 2025-03-12 DIAGNOSIS — I10 ESSENTIAL HYPERTENSION: ICD-10-CM

## 2025-03-12 DIAGNOSIS — Z85.3 HISTORY OF BREAST CANCER: ICD-10-CM

## 2025-03-12 PROCEDURE — 99999 PR PBB SHADOW E&M-EST. PATIENT-LVL III: CPT | Mod: PBBFAC,,, | Performed by: STUDENT IN AN ORGANIZED HEALTH CARE EDUCATION/TRAINING PROGRAM

## 2025-03-12 PROCEDURE — 99214 OFFICE O/P EST MOD 30 MIN: CPT | Mod: S$PBB,,, | Performed by: STUDENT IN AN ORGANIZED HEALTH CARE EDUCATION/TRAINING PROGRAM

## 2025-03-12 PROCEDURE — 99213 OFFICE O/P EST LOW 20 MIN: CPT | Mod: PBBFAC,PN | Performed by: STUDENT IN AN ORGANIZED HEALTH CARE EDUCATION/TRAINING PROGRAM

## 2025-03-12 RX ORDER — OLMESARTAN MEDOXOMIL 5 MG/1
5 TABLET ORAL DAILY
Qty: 30 TABLET | Refills: 11 | Status: SHIPPED | OUTPATIENT
Start: 2025-03-12 | End: 2025-03-13

## 2025-03-12 NOTE — PROGRESS NOTES
[facility]  Nephrology Clinic    Subjective:       HPI ID: Yasmin Verde is a 69 y.o. female who returns for ongoing evaluation and management of CKD.   Yasmin Verde is referred by Freida Warren MD to be evaluated for chronic renal failure.  She was previously followed by me for chronic kidney disease management under a different practice setting.  She has ongoing medical conditions of coronary artery disease status PCI times 10, hypothyroidism, right breast cancer metastatic to left hip, hypertension, chronic esophageal stricture with history of dilations, diabetes type 2 not a long-term use of insulin, LALO on CPAP, restrictive lung disease, sicca syndrome, and chronic kidney disease stage IIIB.  We reviewed her recent lab trends at chair side.  Last chemistry panel was from 09/16/2024 and featured a serum creatinine 1.8 mg/dL which is consistent with her trends over the last 12 months.    Pulmonology: Mark  Cardiology: Beatriz  Oncology: Serafin    Interval history:  10/11/24 - here today to establish continued CKD care. Continues to follow with oncology for regular checks. She reports stable trends of her parameters and PET scan every 6 months.   She has chronic complaints of dry mouth - rx diagnosed with Sicca syndrome. Reviewed medication list with patient.  She has no uremic or urinary symptoms and is in her usual state of health.    Labs reviewed with patient at chairside.     3/12/25 - here today for f/u evaluation. Continues to follow with oncology for metastatic breast cancer; stable disease process per her report. She feels well. Denies acute complaints. We reviewed recent labs at chairside. Reports her glucose has been uptrending lately d/t running out of her oral DM medications - she tells me she is going to discuss with her PCP about starting insulin.  Renal function based on serum creatinine trend remains at baseline.      Review of Systems   Constitutional:   Negative for chills, diaphoresis and fever.   Respiratory:  Negative for cough and shortness of breath.    Cardiovascular:  Negative for chest pain and leg swelling.   Gastrointestinal:  Negative for abdominal pain, diarrhea, nausea and vomiting.   Genitourinary:  Negative for difficulty urinating, dysuria and hematuria.   Musculoskeletal:  Negative for myalgias.   Neurological:  Negative for headaches.   Hematological:  Does not bruise/bleed easily.       The past medical, family and social histories were reviewed for this encounter.     Past Medical History:   Diagnosis Date    Cancer     right breast with mets to left hip     Chronic kidney disease, unspecified     Chronic kidney disease, unspecified     Coronary artery disease     Diabetes mellitus     Dizziness     Headache(784.0)     Hypertension     Hypothyroidism     Obesity     Otitis media     Renal disorder     CKD    Sleep apnea            Current Outpatient Medications   Medication Sig    albuterol (PROVENTIL/VENTOLIN HFA) 90 mcg/actuation inhaler Inhale 1-2 puffs into the lungs every 6 (six) hours as needed for Wheezing or Shortness of Breath (chest tightness). Rescue    amLODIPine (NORVASC) 5 MG tablet Take 1 tablet (5 mg total) by mouth once daily.    aspirin (ECOTRIN) 81 MG EC tablet Take 81 mg by mouth after lunch.    cyanocobalamin 500 MCG tablet Take 1,000 mcg by mouth Daily. (Patient taking differently: Take 1,000 mcg by mouth Daily. Not everyday)    glyBURIDE-metformin 5-500 mg (GLUCOVANCE) 5-500 mg Tab Take 1 tablet by mouth 2 (two) times daily with meals.    letrozole (FEMARA) 2.5 mg Tab Take 1 tablet (2.5 mg total) by mouth once daily.    liothyronine (CYTOMEL) 5 MCG Tab Take 10 mcg by mouth every evening.    metoprolol succinate (TOPROL-XL) 25 MG 24 hr tablet Take 25 mg by mouth after lunch.    ondansetron (ZOFRAN) 4 MG tablet Take 4 mg by mouth daily as needed for Nausea.    ONETOUCH ULTRA TEST Strp 1 strip by Misc.(Non-Drug; Combo Route)  "route once daily. To check blood glucose    oxyCODONE-acetaminophen (PERCOCET)  mg per tablet Take 1 tablet by mouth every 8 (eight) hours as needed for Pain.    palbociclib 125 mg Tab Take 125 mg by mouth once daily. On days 1-21 and 7 days off every 28 days    pantoprazole (PROTONIX) 20 MG tablet Take 20 mg by mouth 2 (two) times daily before meals.    RESTASIS 0.05 % ophthalmic emulsion     sucralfate (CARAFATE) 1 gram tablet Take 1 g by mouth 4 (four) times daily.    tiotropium-olodateroL (STIOLTO RESPIMAT) 2.5-2.5 mcg/actuation Mist Inhale 2 puffs into the lungs once daily. ControllerINHALE 2 INHALATIONS BY MOUTH  INTO THE LUNGS ONCE DAILY  (CONTROLLER)    UNITHROID 75 mcg tablet Take 75 mcg by mouth before breakfast. (Patient taking differently: Take 137 mcg by mouth before breakfast.)    ZINC GLUCONATE ORAL Take 50 mg by mouth 3 (three) times a week.    ALPRAZolam (XANAX) 0.5 MG tablet Take by mouth. (Patient not taking: Reported on 3/12/2025)    olmesartan (BENICAR) 5 MG Tab Take 1 tablet (5 mg total) by mouth once daily.     No current facility-administered medications for this visit.         Objective:   /70 (BP Location: Left arm, Patient Position: Sitting)   Pulse 85   Ht 5' 4" (1.626 m)   Wt 98.9 kg (218 lb 0.6 oz)   SpO2 96%   BMI 37.43 kg/m²      Physical Exam  Vitals reviewed.   Constitutional:       General: She is not in acute distress.     Appearance: Normal appearance.   Cardiovascular:      Pulses: Normal pulses.      Heart sounds: Normal heart sounds.      No friction rub.   Pulmonary:      Effort: Pulmonary effort is normal. No respiratory distress.      Breath sounds: Normal breath sounds.   Abdominal:      General: Abdomen is flat.      Palpations: Abdomen is soft.   Musculoskeletal:         General: No swelling.      Right lower leg: No edema.      Left lower leg: No edema.   Neurological:      General: No focal deficit present.      Mental Status: She is oriented to " person, place, and time.      Motor: No weakness.   Psychiatric:         Mood and Affect: Mood normal.         Behavior: Behavior normal.         Assessment:     Lab Results   Component Value Date    CREATININE 1.73 (H) 03/11/2025    CREATININE 1.72 (H) 03/11/2025    BUN 19 03/11/2025    BUN 19 03/11/2025     (L) 03/11/2025     (L) 03/11/2025    K 4.3 03/11/2025    K 4.4 03/11/2025    CL 97 03/11/2025    CL 99 03/11/2025    CO2 27 03/11/2025    CO2 28 03/11/2025     Lab Results   Component Value Date    PTH 13.9 03/11/2025    CALCIUM 10.3 03/11/2025    CALCIUM 10.4 03/11/2025    PHOS 4.4 03/11/2025     Lab Results   Component Value Date    HCT 35.9 (L) 03/11/2025    HCT 36.0 (L) 03/11/2025     Prot/Creat Ratio, Urine   Date Value Ref Range Status   03/11/2025 0.1 0.0 - 0.2 Final   12/01/2023 130.1 (H) 10.0 - 107.0 mg/g Final   08/22/2023 51.8 10.0 - 107.0 mg/g Final       Lab Results   Component Value Date    MICALBCREAT 31.9 (H) 03/11/2025    MICALBCREAT 11.4 12/01/2023    MICALBCREAT 8.6 08/22/2023    MICALBCREAT 15.9 05/05/2023           1. Stage 3b chronic kidney disease    2. Essential hypertension    3. History of breast cancer    4. Coronary artery disease due to calcified coronary lesion    5. Pure hypercholesterolemia    6. Class 2 obesity with body mass index (BMI) of 35.0 to 35.9 in adult, unspecified obesity type, unspecified whether serious comorbidity present    7. Class 2 severe obesity with serious comorbidity and body mass index (BMI) of 36.0 to 36.9 in adult, unspecified obesity type          Plan:   Return to clinic in 4 months  Labs for next visit include uacr, upcr, ua, pth, cbc, rfp.  Baseline creatinine is 1.6-1.9mg/dL.    CKD stage G3b / A1 PLAN  -renal fxn stable lately. Former renal decline believed related to uncontrolled DM and oncology therapy  -blood pressure trend has been more stable as of late.  Willing to retry a low-dose of angiotensin receptor blocker at this time.  At  Benicar 5 mg daily    -avoiding SGLT2-I d/t history of yeast infections.   -low risk of renal progression based on KFRE model (discussed that this model does not factor uncontrolled DM)    Hypertension - blood pressure trends reviewed.  Medication list reviewed.  Restart low-dose angiotensin receptor blocker (Benicar 5 mg).  If becomes hypotensive recommend discontinuing calcium channel blocker.    DM type II, uncontrolled - f/u with PCP for continued titration. Discussed that this is currently one of her biggest modifiable risk factors for CKD progression.    MBD evaluation - previously had h/o hypercalcemia d/t vit d tox. Improved recently    Breast cancer - followed by dr. Betancourt.  Past Tx w/ letrozole and Ibrance (palbociclib)    Anemia of CKD/chronic disease - followed by oncology    __________________________  James Ireland MD  [facility] [place]    Part of this note has been created using Meliuz dictation system. Errors in transcription may not be completely avoided.      KFRE 2-Year: 1.7% at 3/11/2025  4:24 PM  Calculated from:  Serum Creatinine: 1.73 mg/dL at 3/11/2025  3:47 PM  Urine Albumin Creatinine Ratio: 31.9 ug/mg at 3/11/2025  4:24 PM  Age: 69 years  Sex: Female at 3/11/2025  4:24 PM  Has CKD-3 to CKD-5: Yes    KFRE 5-Year: 5.4% at 3/11/2025  4:24 PM  Calculated from:  Serum Creatinine: 1.73 mg/dL at 3/11/2025  3:47 PM  Urine Albumin Creatinine Ratio: 31.9 ug/mg at 3/11/2025  4:24 PM  Age: 69 years  Sex: Female at 3/11/2025  4:24 PM  Has CKD-3 to CKD-5: Yes

## 2025-03-13 RX ORDER — OLMESARTAN MEDOXOMIL 5 MG/1
5 TABLET ORAL DAILY
Qty: 30 TABLET | Refills: 11 | Status: SHIPPED | OUTPATIENT
Start: 2025-03-13 | End: 2026-03-13

## 2025-03-13 NOTE — TELEPHONE ENCOUNTER
Called patient to inquire where she wanted her Olmesartan 5 mg PO daily Per MD filled. Patient requested script be filled at [facility] [address] store # [id number].

## 2025-04-14 PROBLEM — C50.919 BREAST CANCER: Status: ACTIVE | Noted: 2025-04-14

## 2025-04-14 PROBLEM — I24.9 ACS (ACUTE CORONARY SYNDROME): Status: ACTIVE | Noted: 2025-04-14

## 2025-04-14 PROBLEM — E11.65 HYPERGLYCEMIA DUE TO DIABETES MELLITUS: Status: ACTIVE | Noted: 2025-04-14

## 2025-04-22 DIAGNOSIS — I10 ESSENTIAL HYPERTENSION: Primary | ICD-10-CM

## 2025-04-22 RX ORDER — METOPROLOL SUCCINATE 25 MG/1
25 TABLET, EXTENDED RELEASE ORAL
Qty: 30 TABLET | Refills: 0 | OUTPATIENT
Start: 2025-04-22

## 2025-04-22 NOTE — TELEPHONE ENCOUNTER
----- Message from Ave sent at 4/22/2025  2:13 PM CDT -----  Contact: self  Type:  RX Refill RequestWho Called:  the patientRefill or New Rx:  refillRX Name and Strength:  metoprolol succinate (TOPROL-XL) 25 MG 24 hr tabletHow is the patient currently taking it? (ex. 1XDay):  as directedIs this a 30 day or 90 day RX:  90Preferred Pharmacy with phone number:   [facility] - [place], [place] - [address] [facility] [address] [address]Nkaxp: [phone] Fax: [phone] or Mail Order:  mailOrdering Provider:  Janett Call Back Number:  [phone] Information:  pt is completely out is there a way she could get 7 day supply until mail order can be received, sent to local pharm[id number] [place] - [id number] [place] [address]Lmjgx: [phone] Fax: [phone] 3/12

## 2025-04-25 NOTE — TELEPHONE ENCOUNTER
----- Message from Erika sent at 4/25/2025  1:04 PM CDT -----  Contact: pt  Type:  RX Refill RequestWho Called:  ptRefill or New Rx:   refillRX Name and Strength:   metoprolol succinate (TOPROL-XL) 25 MG 24 hr tablet How is the patient currently taking it? (ex. 1XDay):  as orderedIs this a 30 day or 90 day RX:  90Preferred Pharmacy with phone number:  [id number] [place] - [address] [facility] [id number] [facility] [address] [place] [address]Jzixy: [phone] Fax: [phone] or Mail Order:  localOrdering Provider:  past provider Best Call Back Number:  [phone] Information:   pt said she requested refill Tuesday and she is out of it.

## 2025-04-28 ENCOUNTER — TELEPHONE (OUTPATIENT)
Dept: NEPHROLOGY | Facility: CLINIC | Age: 70
End: 2025-04-28
Payer: MEDICARE

## 2025-04-28 NOTE — TELEPHONE ENCOUNTER
----- Message from Yamileth sent at 4/28/2025  3:07 PM CDT -----  Contact: Patient  Type:  RX Refill RequestWho Called:  PatientRefill or New Rx:  RefillRX Name and Strength:  metoprolol succinate (TOPROL-XL) 25 MG 24 hr tablet Preferred Pharmacy with phone number:  [id number] [place] - [address] [facility] [id number] [facility] [address] [address]Lcsmw: [phone] Fax: [phone] or Mail Order:  LocalOrdering Provider: Dr James Paredesould the patient rather a call back or a response via [facility]?  Call [facility] Call Back Number:  [phone] Information:   States she has been completely out of this medication since last Thursday, 4/24 - states she has sent in refill requests on 4/22 and 4/25 - please call - thank you

## 2025-04-29 RX ORDER — METOPROLOL SUCCINATE 25 MG/1
25 TABLET, EXTENDED RELEASE ORAL
Qty: 90 TABLET | Refills: 3 | Status: SHIPPED | OUTPATIENT
Start: 2025-04-29 | End: 2026-04-24

## 2025-04-30 PROBLEM — Z98.61 S/P PTCA (PERCUTANEOUS TRANSLUMINAL CORONARY ANGIOPLASTY): Status: ACTIVE | Noted: 2025-04-30

## 2025-05-01 ENCOUNTER — TELEPHONE (OUTPATIENT)
Dept: NEPHROLOGY | Facility: CLINIC | Age: 70
End: 2025-05-01
Payer: MEDICARE

## 2025-05-01 NOTE — TELEPHONE ENCOUNTER
----- Message from Nayla sent at 5/1/2025  8:06 AM CDT -----  Regarding: Pt drop off  Pt came to clinic and dropped off an envelope for Dr Beka Cantrell and she would like a call back to discuss the importance of the letter.I am going to bring back to you guys.

## 2025-05-01 NOTE — TELEPHONE ENCOUNTER
Patient left a note with reception requesting a callback. Called and spoke to patient. Patient requested a refill on rx metoprolol succinate. Patient informed refills have already been sent to [facility] on 04/29/25. Patient verbalized understanding.

## 2025-06-07 PROBLEM — I99.8 ISCHEMIC FOOT PAIN AT REST: Status: ACTIVE | Noted: 2025-06-07

## 2025-06-07 PROBLEM — N18.9 ACUTE KIDNEY INJURY SUPERIMPOSED ON CKD: Status: ACTIVE | Noted: 2017-05-24

## 2025-06-07 PROBLEM — M79.673 ISCHEMIC FOOT PAIN AT REST: Status: ACTIVE | Noted: 2025-06-07

## 2025-06-10 PROBLEM — Z73.6 LIMITATION OF ACTIVITY DUE TO DISABILITY: Status: ACTIVE | Noted: 2025-06-10

## 2025-06-14 ENCOUNTER — ANESTHESIA (OUTPATIENT)
Dept: SURGERY | Facility: HOSPITAL | Age: 70
DRG: 253 | End: 2025-06-14
Payer: MEDICARE

## 2025-06-14 ENCOUNTER — HOSPITAL ENCOUNTER (INPATIENT)
Facility: HOSPITAL | Age: 70
LOS: 3 days | Discharge: HOME HEALTH | DRG: 253 | End: 2025-06-17
Attending: INTERNAL MEDICINE | Admitting: INTERNAL MEDICINE
Payer: MEDICARE

## 2025-06-14 ENCOUNTER — ANESTHESIA EVENT (OUTPATIENT)
Dept: SURGERY | Facility: HOSPITAL | Age: 70
DRG: 253 | End: 2025-06-14
Payer: MEDICARE

## 2025-06-14 DIAGNOSIS — I72.9 PSEUDOANEURYSM: ICD-10-CM

## 2025-06-14 DIAGNOSIS — C50.811 MALIGNANT NEOPLASM OF OVERLAPPING SITES OF RIGHT FEMALE BREAST, UNSPECIFIED ESTROGEN RECEPTOR STATUS: ICD-10-CM

## 2025-06-14 DIAGNOSIS — I72.4 PSEUDOANEURYSM OF LEFT FEMORAL ARTERY: Primary | ICD-10-CM

## 2025-06-14 DIAGNOSIS — R07.9 CHEST PAIN: ICD-10-CM

## 2025-06-14 PROBLEM — D62 ACUTE BLOOD LOSS ANEMIA: Status: ACTIVE | Noted: 2025-06-14

## 2025-06-14 LAB
ALBUMIN SERPL-MCNC: 3.1 G/DL (ref 3.5–5.2)
ALP SERPL-CCNC: 141 UNIT/L (ref 55–135)
ALT SERPL-CCNC: 43 UNIT/L (ref 10–44)
ANION GAP (SMH): 21 MMOL/L (ref 8–16)
APTT PPP: 27.5 SECONDS (ref 21–32)
AST SERPL-CCNC: 43 UNIT/L (ref 10–40)
BILIRUB SERPL-MCNC: 0.6 MG/DL (ref 0.1–1)
BUN SERPL-MCNC: 17 MG/DL (ref 8–23)
CALCIUM SERPL-MCNC: 8.1 MG/DL (ref 8.7–10.5)
CHLORIDE SERPL-SCNC: 103 MMOL/L (ref 95–110)
CO2 SERPL-SCNC: 14 MMOL/L (ref 23–29)
CREAT SERPL-MCNC: 2.1 MG/DL (ref 0.5–1.4)
ERYTHROCYTE [DISTWIDTH] IN BLOOD BY AUTOMATED COUNT: 15.1 % (ref 11.5–14.5)
GFR SERPLBLD CREATININE-BSD FMLA CKD-EPI: 25 ML/MIN/1.73/M2
GLUCOSE SERPL-MCNC: 270 MG/DL (ref 70–110)
HCT VFR BLD AUTO: 23.4 % (ref 37–48.5)
HCT VFR BLD AUTO: 23.4 % (ref 37–48.5)
HGB BLD-MCNC: 7.6 GM/DL (ref 12–16)
HGB BLD-MCNC: 7.6 GM/DL (ref 12–16)
INDIRECT COOMBS: NORMAL
INR PPP: 1.1 (ref 0.8–1.2)
MCH RBC QN AUTO: 37.6 PG (ref 27–31)
MCHC RBC AUTO-ENTMCNC: 32.5 G/DL (ref 32–36)
MCV RBC AUTO: 116 FL (ref 82–98)
NUCLEATED RBC (/100WBC) (SMH): 5 /100 WBC
PLATELET # BLD AUTO: 329 K/UL (ref 150–450)
PMV BLD AUTO: 10.5 FL (ref 9.2–12.9)
POTASSIUM SERPL-SCNC: 4.5 MMOL/L (ref 3.5–5.1)
PROT SERPL-MCNC: 5.8 GM/DL (ref 6–8.4)
PROTHROMBIN TIME: 12.6 SECONDS (ref 9–12.5)
RBC # BLD AUTO: 2.02 M/UL (ref 4–5.4)
RH BLD: NORMAL
SODIUM SERPL-SCNC: 138 MMOL/L (ref 136–145)
SPECIMEN OUTDATE: NORMAL
T4 FREE SERPL-MCNC: 0.9 NG/DL (ref 0.71–1.51)
TSH SERPL-ACNC: 0.32 UIU/ML (ref 0.34–5.6)
WBC # BLD AUTO: 6.66 K/UL (ref 3.9–12.7)

## 2025-06-14 PROCEDURE — 94799 UNLISTED PULMONARY SVC/PX: CPT

## 2025-06-14 PROCEDURE — 36000707: Performed by: SURGERY

## 2025-06-14 PROCEDURE — 99900035 HC TECH TIME PER 15 MIN (STAT)

## 2025-06-14 PROCEDURE — 27201423 OPTIME MED/SURG SUP & DEVICES STERILE SUPPLY: Performed by: SURGERY

## 2025-06-14 PROCEDURE — 85730 THROMBOPLASTIN TIME PARTIAL: CPT | Performed by: INTERNAL MEDICINE

## 2025-06-14 PROCEDURE — D9220A PRA ANESTHESIA: Mod: CRNA,,, | Performed by: STUDENT IN AN ORGANIZED HEALTH CARE EDUCATION/TRAINING PROGRAM

## 2025-06-14 PROCEDURE — 99406 BEHAV CHNG SMOKING 3-10 MIN: CPT

## 2025-06-14 PROCEDURE — 84439 ASSAY OF FREE THYROXINE: CPT | Performed by: INTERNAL MEDICINE

## 2025-06-14 PROCEDURE — 84443 ASSAY THYROID STIM HORMONE: CPT | Performed by: INTERNAL MEDICINE

## 2025-06-14 PROCEDURE — 37000008 HC ANESTHESIA 1ST 15 MINUTES: Performed by: SURGERY

## 2025-06-14 PROCEDURE — 36000706: Performed by: SURGERY

## 2025-06-14 PROCEDURE — 85018 HEMOGLOBIN: CPT | Performed by: INTERNAL MEDICINE

## 2025-06-14 PROCEDURE — 63600175 PHARM REV CODE 636 W HCPCS: Performed by: STUDENT IN AN ORGANIZED HEALTH CARE EDUCATION/TRAINING PROGRAM

## 2025-06-14 PROCEDURE — 94761 N-INVAS EAR/PLS OXIMETRY MLT: CPT

## 2025-06-14 PROCEDURE — 36415 COLL VENOUS BLD VENIPUNCTURE: CPT | Performed by: INTERNAL MEDICINE

## 2025-06-14 PROCEDURE — D9220A PRA ANESTHESIA: Mod: ANES,,, | Performed by: ANESTHESIOLOGY

## 2025-06-14 PROCEDURE — C1729 CATH, DRAINAGE: HCPCS | Performed by: SURGERY

## 2025-06-14 PROCEDURE — 80053 COMPREHEN METABOLIC PANEL: CPT | Performed by: INTERNAL MEDICINE

## 2025-06-14 PROCEDURE — 85610 PROTHROMBIN TIME: CPT | Performed by: INTERNAL MEDICINE

## 2025-06-14 PROCEDURE — 20600001 HC STEP DOWN PRIVATE ROOM

## 2025-06-14 PROCEDURE — 85025 COMPLETE CBC W/AUTO DIFF WBC: CPT | Performed by: SURGERY

## 2025-06-14 PROCEDURE — 25000003 PHARM REV CODE 250: Performed by: SURGERY

## 2025-06-14 PROCEDURE — 99900031 HC PATIENT EDUCATION (STAT)

## 2025-06-14 PROCEDURE — 63600175 PHARM REV CODE 636 W HCPCS: Performed by: SURGERY

## 2025-06-14 PROCEDURE — 25000003 PHARM REV CODE 250: Performed by: STUDENT IN AN ORGANIZED HEALTH CARE EDUCATION/TRAINING PROGRAM

## 2025-06-14 PROCEDURE — 86850 RBC ANTIBODY SCREEN: CPT | Performed by: INTERNAL MEDICINE

## 2025-06-14 PROCEDURE — 37000009 HC ANESTHESIA EA ADD 15 MINS: Performed by: SURGERY

## 2025-06-14 PROCEDURE — 85014 HEMATOCRIT: CPT | Performed by: INTERNAL MEDICINE

## 2025-06-14 RX ORDER — LIOTHYRONINE SODIUM 5 UG/1
10 TABLET ORAL NIGHTLY
Status: DISCONTINUED | OUTPATIENT
Start: 2025-06-14 | End: 2025-06-17 | Stop reason: HOSPADM

## 2025-06-14 RX ORDER — HYDROCODONE BITARTRATE AND ACETAMINOPHEN 5; 325 MG/1; MG/1
1 TABLET ORAL EVERY 6 HOURS PRN
Refills: 0 | Status: DISCONTINUED | OUTPATIENT
Start: 2025-06-14 | End: 2025-06-14

## 2025-06-14 RX ORDER — OXYCODONE AND ACETAMINOPHEN 10; 325 MG/1; MG/1
1 TABLET ORAL EVERY 4 HOURS PRN
Refills: 0 | Status: DISCONTINUED | OUTPATIENT
Start: 2025-06-14 | End: 2025-06-17 | Stop reason: HOSPADM

## 2025-06-14 RX ORDER — IBUPROFEN 200 MG
24 TABLET ORAL
Status: DISCONTINUED | OUTPATIENT
Start: 2025-06-14 | End: 2025-06-17 | Stop reason: HOSPADM

## 2025-06-14 RX ORDER — INSULIN ASPART 100 [IU]/ML
0-5 INJECTION, SOLUTION INTRAVENOUS; SUBCUTANEOUS EVERY 6 HOURS PRN
Status: DISCONTINUED | OUTPATIENT
Start: 2025-06-14 | End: 2025-06-16

## 2025-06-14 RX ORDER — ONDANSETRON HYDROCHLORIDE 2 MG/ML
4 INJECTION, SOLUTION INTRAVENOUS EVERY 6 HOURS PRN
Status: DISCONTINUED | OUTPATIENT
Start: 2025-06-14 | End: 2025-06-17 | Stop reason: HOSPADM

## 2025-06-14 RX ORDER — NALOXONE HCL 0.4 MG/ML
0.02 VIAL (ML) INJECTION
Status: DISCONTINUED | OUTPATIENT
Start: 2025-06-14 | End: 2025-06-17 | Stop reason: HOSPADM

## 2025-06-14 RX ORDER — ARFORMOTEROL TARTRATE 15 UG/2ML
15 SOLUTION RESPIRATORY (INHALATION) 2 TIMES DAILY
Status: DISCONTINUED | OUTPATIENT
Start: 2025-06-15 | End: 2025-06-15

## 2025-06-14 RX ORDER — ACETAMINOPHEN 325 MG/1
650 TABLET ORAL EVERY 8 HOURS PRN
Status: DISCONTINUED | OUTPATIENT
Start: 2025-06-14 | End: 2025-06-17 | Stop reason: HOSPADM

## 2025-06-14 RX ORDER — SUCRALFATE 1 G/10ML
1 SUSPENSION ORAL EVERY 6 HOURS
Status: DISCONTINUED | OUTPATIENT
Start: 2025-06-15 | End: 2025-06-15

## 2025-06-14 RX ORDER — PANTOPRAZOLE SODIUM 40 MG/1
40 TABLET, DELAYED RELEASE ORAL DAILY
Status: DISCONTINUED | OUTPATIENT
Start: 2025-06-15 | End: 2025-06-17 | Stop reason: HOSPADM

## 2025-06-14 RX ORDER — ACETAMINOPHEN 325 MG/1
650 TABLET ORAL EVERY 4 HOURS PRN
Status: DISCONTINUED | OUTPATIENT
Start: 2025-06-14 | End: 2025-06-17 | Stop reason: HOSPADM

## 2025-06-14 RX ORDER — HYDROCODONE BITARTRATE AND ACETAMINOPHEN 500; 5 MG/1; MG/1
TABLET ORAL
Status: DISCONTINUED | OUTPATIENT
Start: 2025-06-14 | End: 2025-06-17 | Stop reason: HOSPADM

## 2025-06-14 RX ORDER — AMOXICILLIN 250 MG
1 CAPSULE ORAL DAILY PRN
Status: DISCONTINUED | OUTPATIENT
Start: 2025-06-14 | End: 2025-06-17 | Stop reason: HOSPADM

## 2025-06-14 RX ORDER — LEVOTHYROXINE SODIUM 112 UG/1
112 TABLET ORAL
Status: DISCONTINUED | OUTPATIENT
Start: 2025-06-15 | End: 2025-06-17 | Stop reason: HOSPADM

## 2025-06-14 RX ORDER — TALC
6 POWDER (GRAM) TOPICAL NIGHTLY PRN
Status: DISCONTINUED | OUTPATIENT
Start: 2025-06-14 | End: 2025-06-17 | Stop reason: HOSPADM

## 2025-06-14 RX ORDER — IBUPROFEN 200 MG
16 TABLET ORAL
Status: DISCONTINUED | OUTPATIENT
Start: 2025-06-14 | End: 2025-06-17 | Stop reason: HOSPADM

## 2025-06-14 RX ORDER — GLUCAGON 1 MG
1 KIT INJECTION
Status: DISCONTINUED | OUTPATIENT
Start: 2025-06-14 | End: 2025-06-17 | Stop reason: HOSPADM

## 2025-06-14 RX ORDER — IPRATROPIUM BROMIDE 0.5 MG/2.5ML
0.5 SOLUTION RESPIRATORY (INHALATION) EVERY 6 HOURS
Status: DISCONTINUED | OUTPATIENT
Start: 2025-06-15 | End: 2025-06-15

## 2025-06-14 RX ORDER — GLUCAGON 1 MG
1 KIT INJECTION
Status: DISCONTINUED | OUTPATIENT
Start: 2025-06-14 | End: 2025-06-16

## 2025-06-14 RX ADMIN — FENTANYL CITRATE 50 MCG: 50 INJECTION, SOLUTION INTRAMUSCULAR; INTRAVENOUS at 11:06

## 2025-06-14 RX ADMIN — ROCURONIUM BROMIDE 5 MG: 10 INJECTION, SOLUTION INTRAVENOUS at 11:06

## 2025-06-14 RX ADMIN — LIDOCAINE HYDROCHLORIDE 4 ML: 20 JELLY TOPICAL at 11:06

## 2025-06-14 RX ADMIN — LIDOCAINE HYDROCHLORIDE 60 MG: 20 INJECTION, SOLUTION INTRAVENOUS at 11:06

## 2025-06-14 RX ADMIN — SODIUM CHLORIDE, SODIUM LACTATE, POTASSIUM CHLORIDE, AND CALCIUM CHLORIDE: .6; .31; .03; .02 INJECTION, SOLUTION INTRAVENOUS at 11:06

## 2025-06-14 RX ADMIN — Medication 200 MG: at 11:06

## 2025-06-14 RX ADMIN — ETOMIDATE 14 MG: 2 INJECTION, SOLUTION INTRAVENOUS at 11:06

## 2025-06-14 RX ADMIN — PROTAMINE SULFATE 25 MG: 10 INJECTION, SOLUTION INTRAVENOUS at 09:06

## 2025-06-15 LAB
ABO + RH BLD: NORMAL
ABO + RH BLD: NORMAL
ABORH RETYPE: NORMAL
ALBUMIN SERPL-MCNC: 2.7 G/DL (ref 3.5–5.2)
ALP SERPL-CCNC: 116 UNIT/L (ref 55–135)
ALT SERPL-CCNC: 36 UNIT/L (ref 10–44)
ANION GAP (SMH): 15 MMOL/L (ref 8–16)
AST SERPL-CCNC: 29 UNIT/L (ref 10–40)
BILIRUB SERPL-MCNC: 1 MG/DL (ref 0.1–1)
BLD PROD TYP BPU: NORMAL
BLD PROD TYP BPU: NORMAL
BLOOD UNIT EXPIRATION DATE: NORMAL
BLOOD UNIT EXPIRATION DATE: NORMAL
BLOOD UNIT TYPE CODE: 5100
BLOOD UNIT TYPE CODE: 5100
BUN SERPL-MCNC: 20 MG/DL (ref 8–23)
CALCIUM SERPL-MCNC: 7.5 MG/DL (ref 8.7–10.5)
CHLORIDE SERPL-SCNC: 105 MMOL/L (ref 95–110)
CO2 SERPL-SCNC: 17 MMOL/L (ref 23–29)
CREAT SERPL-MCNC: 2.1 MG/DL (ref 0.5–1.4)
CROSSMATCH INTERPRETATION: NORMAL
CROSSMATCH INTERPRETATION: NORMAL
DISPENSE STATUS: NORMAL
DISPENSE STATUS: NORMAL
ERYTHROCYTE [DISTWIDTH] IN BLOOD BY AUTOMATED COUNT: 19.6 % (ref 11.5–14.5)
GFR SERPLBLD CREATININE-BSD FMLA CKD-EPI: 25 ML/MIN/1.73/M2
GLUCOSE SERPL-MCNC: 251 MG/DL (ref 70–110)
HCT VFR BLD AUTO: 26.6 % (ref 37–48.5)
HCT VFR BLD AUTO: 31.3 % (ref 37–48.5)
HCT VFR BLD AUTO: 33.5 % (ref 37–48.5)
HCT VFR BLD AUTO: 34.4 % (ref 37–48.5)
HGB BLD-MCNC: 10.9 GM/DL (ref 12–16)
HGB BLD-MCNC: 11.5 GM/DL (ref 12–16)
HGB BLD-MCNC: 11.6 GM/DL (ref 12–16)
HGB BLD-MCNC: 9.3 GM/DL (ref 12–16)
HYPOCHROMIA BLD QL SMEAR: ABNORMAL
LYMPHOCYTES NFR BLD MANUAL: 12 % (ref 18–48)
LYMPHOCYTES NFR BLD MANUAL: 6 % (ref 18–48)
MAGNESIUM SERPL-MCNC: 1.2 MG/DL (ref 1.6–2.6)
MAGNESIUM SERPL-MCNC: 1.3 MG/DL (ref 1.6–2.6)
MCH RBC QN AUTO: 32.9 PG (ref 27–31)
MCHC RBC AUTO-ENTMCNC: 33.7 G/DL (ref 32–36)
MCV RBC AUTO: 98 FL (ref 82–98)
METAMYELOCYTES NFR BLD MANUAL: 1 %
MONOCYTES NFR BLD MANUAL: 6 % (ref 4–15)
MONOCYTES NFR BLD MANUAL: 8 % (ref 4–15)
MYELOCYTES NFR BLD MANUAL: 2 %
NEUTROPHILS NFR BLD MANUAL: 74 % (ref 38–73)
NEUTROPHILS NFR BLD MANUAL: 82 % (ref 38–73)
NEUTS BAND NFR BLD MANUAL: 3 %
NEUTS BAND NFR BLD MANUAL: 6 %
NUCLEATED RBC (/100WBC) (SMH): 5 /100 WBC
OVALOCYTES BLD QL SMEAR: ABNORMAL
PLATELET # BLD AUTO: 212 K/UL (ref 150–450)
PLATELET BLD QL SMEAR: ABNORMAL
PLATELET BLD QL SMEAR: ABNORMAL
PMV BLD AUTO: 10 FL (ref 9.2–12.9)
POCT GLUCOSE: 274 MG/DL (ref 70–110)
POCT GLUCOSE: 279 MG/DL (ref 70–110)
POLYCHROMASIA BLD QL SMEAR: ABNORMAL
POTASSIUM SERPL-SCNC: 4.7 MMOL/L (ref 3.5–5.1)
PROT SERPL-MCNC: 5 GM/DL (ref 6–8.4)
RBC # BLD AUTO: 3.53 M/UL (ref 4–5.4)
SCHISTOCYTES BLD QL SMEAR: ABNORMAL
SODIUM SERPL-SCNC: 137 MMOL/L (ref 136–145)
UNIT NUMBER: NORMAL
UNIT NUMBER: NORMAL
WBC # BLD AUTO: 6.06 K/UL (ref 3.9–12.7)

## 2025-06-15 PROCEDURE — 25000242 PHARM REV CODE 250 ALT 637 W/ HCPCS: Performed by: SURGERY

## 2025-06-15 PROCEDURE — 86920 COMPATIBILITY TEST SPIN: CPT | Performed by: SURGERY

## 2025-06-15 PROCEDURE — 25000003 PHARM REV CODE 250: Performed by: HOSPITALIST

## 2025-06-15 PROCEDURE — 25000003 PHARM REV CODE 250: Performed by: SURGERY

## 2025-06-15 PROCEDURE — 63600175 PHARM REV CODE 636 W HCPCS: Performed by: SURGERY

## 2025-06-15 PROCEDURE — 94640 AIRWAY INHALATION TREATMENT: CPT

## 2025-06-15 PROCEDURE — 04QL0ZZ REPAIR LEFT FEMORAL ARTERY, OPEN APPROACH: ICD-10-PCS | Performed by: SURGERY

## 2025-06-15 PROCEDURE — 04CL0ZZ EXTIRPATION OF MATTER FROM LEFT FEMORAL ARTERY, OPEN APPROACH: ICD-10-PCS | Performed by: SURGERY

## 2025-06-15 PROCEDURE — 82040 ASSAY OF SERUM ALBUMIN: CPT | Performed by: SURGERY

## 2025-06-15 PROCEDURE — 85025 COMPLETE CBC W/AUTO DIFF WBC: CPT | Performed by: INTERNAL MEDICINE

## 2025-06-15 PROCEDURE — 36415 COLL VENOUS BLD VENIPUNCTURE: CPT | Performed by: INTERNAL MEDICINE

## 2025-06-15 PROCEDURE — 85014 HEMATOCRIT: CPT | Performed by: HOSPITALIST

## 2025-06-15 PROCEDURE — P9016 RBC LEUKOCYTES REDUCED: HCPCS | Performed by: INTERNAL MEDICINE

## 2025-06-15 PROCEDURE — 63600175 PHARM REV CODE 636 W HCPCS: Performed by: HOSPITALIST

## 2025-06-15 PROCEDURE — 99900031 HC PATIENT EDUCATION (STAT)

## 2025-06-15 PROCEDURE — 11000001 HC ACUTE MED/SURG PRIVATE ROOM

## 2025-06-15 PROCEDURE — 99900035 HC TECH TIME PER 15 MIN (STAT)

## 2025-06-15 PROCEDURE — 83735 ASSAY OF MAGNESIUM: CPT | Performed by: HOSPITALIST

## 2025-06-15 PROCEDURE — 63600175 PHARM REV CODE 636 W HCPCS: Mod: JZ | Performed by: ANESTHESIOLOGY

## 2025-06-15 PROCEDURE — 25000003 PHARM REV CODE 250: Performed by: STUDENT IN AN ORGANIZED HEALTH CARE EDUCATION/TRAINING PROGRAM

## 2025-06-15 PROCEDURE — 94799 UNLISTED PULMONARY SVC/PX: CPT

## 2025-06-15 PROCEDURE — 86920 COMPATIBILITY TEST SPIN: CPT | Performed by: INTERNAL MEDICINE

## 2025-06-15 PROCEDURE — 99406 BEHAV CHNG SMOKING 3-10 MIN: CPT

## 2025-06-15 PROCEDURE — 25000003 PHARM REV CODE 250: Performed by: INTERNAL MEDICINE

## 2025-06-15 PROCEDURE — 63600175 PHARM REV CODE 636 W HCPCS: Performed by: STUDENT IN AN ORGANIZED HEALTH CARE EDUCATION/TRAINING PROGRAM

## 2025-06-15 PROCEDURE — 25000003 PHARM REV CODE 250: Performed by: ANESTHESIOLOGY

## 2025-06-15 PROCEDURE — 85014 HEMATOCRIT: CPT | Performed by: SURGERY

## 2025-06-15 PROCEDURE — 21400001 HC TELEMETRY ROOM

## 2025-06-15 PROCEDURE — 85018 HEMOGLOBIN: CPT | Performed by: HOSPITALIST

## 2025-06-15 PROCEDURE — 83735 ASSAY OF MAGNESIUM: CPT | Performed by: SURGERY

## 2025-06-15 PROCEDURE — 85018 HEMOGLOBIN: CPT | Performed by: SURGERY

## 2025-06-15 PROCEDURE — 36556 INSERT NON-TUNNEL CV CATH: CPT | Mod: XU,,, | Performed by: ANESTHESIOLOGY

## 2025-06-15 PROCEDURE — 27000221 HC OXYGEN, UP TO 24 HOURS

## 2025-06-15 PROCEDURE — 94761 N-INVAS EAR/PLS OXIMETRY MLT: CPT

## 2025-06-15 RX ORDER — HYDROMORPHONE HYDROCHLORIDE 1 MG/ML
1.5 INJECTION, SOLUTION INTRAMUSCULAR; INTRAVENOUS; SUBCUTANEOUS EVERY 4 HOURS PRN
Refills: 0 | Status: DISCONTINUED | OUTPATIENT
Start: 2025-06-15 | End: 2025-06-15

## 2025-06-15 RX ORDER — ARFORMOTEROL TARTRATE 15 UG/2ML
15 SOLUTION RESPIRATORY (INHALATION) 2 TIMES DAILY
Status: DISCONTINUED | OUTPATIENT
Start: 2025-06-15 | End: 2025-06-17 | Stop reason: HOSPADM

## 2025-06-15 RX ORDER — CLOPIDOGREL BISULFATE 75 MG/1
75 TABLET ORAL DAILY
Status: DISCONTINUED | OUTPATIENT
Start: 2025-06-15 | End: 2025-06-17 | Stop reason: HOSPADM

## 2025-06-15 RX ORDER — SUCRALFATE 1 G/10ML
1 SUSPENSION ORAL
Status: DISCONTINUED | OUTPATIENT
Start: 2025-06-15 | End: 2025-06-17 | Stop reason: HOSPADM

## 2025-06-15 RX ORDER — ETOMIDATE 2 MG/ML
INJECTION INTRAVENOUS
Status: DISCONTINUED | OUTPATIENT
Start: 2025-06-14 | End: 2025-06-15

## 2025-06-15 RX ORDER — MUPIROCIN 20 MG/G
OINTMENT TOPICAL 2 TIMES DAILY
Status: DISCONTINUED | OUTPATIENT
Start: 2025-06-15 | End: 2025-06-17 | Stop reason: HOSPADM

## 2025-06-15 RX ORDER — SODIUM CHLORIDE, SODIUM LACTATE, POTASSIUM CHLORIDE, CALCIUM CHLORIDE 600; 310; 30; 20 MG/100ML; MG/100ML; MG/100ML; MG/100ML
INJECTION, SOLUTION INTRAVENOUS CONTINUOUS
Status: ACTIVE | OUTPATIENT
Start: 2025-06-15 | End: 2025-06-15

## 2025-06-15 RX ORDER — HYDROMORPHONE HYDROCHLORIDE 1 MG/ML
0.5 INJECTION, SOLUTION INTRAMUSCULAR; INTRAVENOUS; SUBCUTANEOUS EVERY 4 HOURS PRN
Refills: 0 | Status: DISCONTINUED | OUTPATIENT
Start: 2025-06-15 | End: 2025-06-17 | Stop reason: HOSPADM

## 2025-06-15 RX ORDER — LIDOCAINE HYDROCHLORIDE 20 MG/ML
JELLY TOPICAL
Status: DISCONTINUED | OUTPATIENT
Start: 2025-06-14 | End: 2025-06-15

## 2025-06-15 RX ORDER — ACETAMINOPHEN 10 MG/ML
INJECTION, SOLUTION INTRAVENOUS
Status: DISCONTINUED | OUTPATIENT
Start: 2025-06-15 | End: 2025-06-15

## 2025-06-15 RX ORDER — ONDANSETRON HYDROCHLORIDE 2 MG/ML
INJECTION, SOLUTION INTRAVENOUS
Status: DISCONTINUED | OUTPATIENT
Start: 2025-06-15 | End: 2025-06-15

## 2025-06-15 RX ORDER — SUCCINYLCHOLINE CHLORIDE 20 MG/ML
INJECTION INTRAMUSCULAR; INTRAVENOUS
Status: DISCONTINUED | OUTPATIENT
Start: 2025-06-14 | End: 2025-06-15

## 2025-06-15 RX ORDER — FAMOTIDINE 10 MG/ML
INJECTION, SOLUTION INTRAVENOUS
Status: DISCONTINUED | OUTPATIENT
Start: 2025-06-15 | End: 2025-06-15

## 2025-06-15 RX ORDER — FENTANYL CITRATE 50 UG/ML
INJECTION, SOLUTION INTRAMUSCULAR; INTRAVENOUS
Status: DISCONTINUED | OUTPATIENT
Start: 2025-06-14 | End: 2025-06-15

## 2025-06-15 RX ORDER — LIDOCAINE HYDROCHLORIDE 20 MG/ML
INJECTION, SOLUTION EPIDURAL; INFILTRATION; INTRACAUDAL; PERINEURAL
Status: DISCONTINUED | OUTPATIENT
Start: 2025-06-14 | End: 2025-06-15

## 2025-06-15 RX ORDER — CEFAZOLIN SODIUM 1 G/3ML
INJECTION, POWDER, FOR SOLUTION INTRAMUSCULAR; INTRAVENOUS
Status: DISCONTINUED | OUTPATIENT
Start: 2025-06-15 | End: 2025-06-15

## 2025-06-15 RX ORDER — IPRATROPIUM BROMIDE 0.5 MG/2.5ML
0.5 SOLUTION RESPIRATORY (INHALATION) EVERY 6 HOURS
Status: DISCONTINUED | OUTPATIENT
Start: 2025-06-15 | End: 2025-06-17 | Stop reason: HOSPADM

## 2025-06-15 RX ORDER — ROCURONIUM BROMIDE 10 MG/ML
INJECTION, SOLUTION INTRAVENOUS
Status: DISCONTINUED | OUTPATIENT
Start: 2025-06-14 | End: 2025-06-15

## 2025-06-15 RX ORDER — METOPROLOL SUCCINATE 25 MG/1
25 TABLET, EXTENDED RELEASE ORAL
Status: DISCONTINUED | OUTPATIENT
Start: 2025-06-15 | End: 2025-06-17 | Stop reason: HOSPADM

## 2025-06-15 RX ORDER — MAGNESIUM SULFATE HEPTAHYDRATE 40 MG/ML
2 INJECTION, SOLUTION INTRAVENOUS ONCE
Status: COMPLETED | OUTPATIENT
Start: 2025-06-15 | End: 2025-06-15

## 2025-06-15 RX ORDER — KETAMINE HCL IN 0.9 % NACL 50 MG/5 ML
SYRINGE (ML) INTRAVENOUS
Status: DISCONTINUED | OUTPATIENT
Start: 2025-06-15 | End: 2025-06-15

## 2025-06-15 RX ORDER — SODIUM CHLORIDE, SODIUM LACTATE, POTASSIUM CHLORIDE, CALCIUM CHLORIDE 600; 310; 30; 20 MG/100ML; MG/100ML; MG/100ML; MG/100ML
INJECTION, SOLUTION INTRAVENOUS CONTINUOUS PRN
Status: DISCONTINUED | OUTPATIENT
Start: 2025-06-14 | End: 2025-06-15

## 2025-06-15 RX ORDER — LETROZOLE 2.5 MG/1
2.5 TABLET, FILM COATED ORAL DAILY
Status: DISCONTINUED | OUTPATIENT
Start: 2025-06-15 | End: 2025-06-17 | Stop reason: HOSPADM

## 2025-06-15 RX ADMIN — SODIUM CHLORIDE, POTASSIUM CHLORIDE, SODIUM LACTATE AND CALCIUM CHLORIDE: 600; 310; 30; 20 INJECTION, SOLUTION INTRAVENOUS at 04:06

## 2025-06-15 RX ADMIN — ARFORMOTEROL TARTRATE 15 MCG: 15 SOLUTION RESPIRATORY (INHALATION) at 08:06

## 2025-06-15 RX ADMIN — ARFORMOTEROL TARTRATE 15 MCG: 15 SOLUTION RESPIRATORY (INHALATION) at 07:06

## 2025-06-15 RX ADMIN — HYDROMORPHONE HYDROCHLORIDE 1.5 MG: 1 INJECTION, SOLUTION INTRAMUSCULAR; INTRAVENOUS; SUBCUTANEOUS at 09:06

## 2025-06-15 RX ADMIN — APIXABAN 5 MG: 5 TABLET, FILM COATED ORAL at 09:06

## 2025-06-15 RX ADMIN — INSULIN ASPART 3 UNITS: 100 INJECTION, SOLUTION INTRAVENOUS; SUBCUTANEOUS at 06:06

## 2025-06-15 RX ADMIN — CLOPIDOGREL 75 MG: 75 TABLET ORAL at 03:06

## 2025-06-15 RX ADMIN — ACETAMINOPHEN 1000 MG: 10 INJECTION, SOLUTION INTRAVENOUS at 12:06

## 2025-06-15 RX ADMIN — LIOTHYRONINE SODIUM 10 MCG: 5 TABLET ORAL at 09:06

## 2025-06-15 RX ADMIN — PANTOPRAZOLE SODIUM 40 MG: 40 TABLET, DELAYED RELEASE ORAL at 06:06

## 2025-06-15 RX ADMIN — MUPIROCIN 1 G: 20 OINTMENT TOPICAL at 09:06

## 2025-06-15 RX ADMIN — IPRATROPIUM BROMIDE 0.5 MG: 0.5 SOLUTION RESPIRATORY (INHALATION) at 08:06

## 2025-06-15 RX ADMIN — CEFAZOLIN 2 G: 330 INJECTION, POWDER, FOR SOLUTION INTRAMUSCULAR; INTRAVENOUS at 12:06

## 2025-06-15 RX ADMIN — MAGNESIUM SULFATE HEPTAHYDRATE 2 G: 40 INJECTION, SOLUTION INTRAVENOUS at 06:06

## 2025-06-15 RX ADMIN — ROCURONIUM BROMIDE 45 MG: 10 INJECTION, SOLUTION INTRAVENOUS at 12:06

## 2025-06-15 RX ADMIN — METOPROLOL SUCCINATE 25 MG: 25 TABLET, EXTENDED RELEASE ORAL at 03:06

## 2025-06-15 RX ADMIN — FENTANYL CITRATE 50 MCG: 50 INJECTION, SOLUTION INTRAMUSCULAR; INTRAVENOUS at 12:06

## 2025-06-15 RX ADMIN — LETROZOLE 2.5 MG: 2.5 TABLET ORAL at 03:06

## 2025-06-15 RX ADMIN — ONDANSETRON 4 MG: 2 INJECTION INTRAMUSCULAR; INTRAVENOUS at 08:06

## 2025-06-15 RX ADMIN — SUCRALFATE ORAL 1 G: 1 SUSPENSION ORAL at 05:06

## 2025-06-15 RX ADMIN — SUCRALFATE ORAL 1 G: 1 SUSPENSION ORAL at 12:06

## 2025-06-15 RX ADMIN — FAMOTIDINE 20 MG: 10 INJECTION, SOLUTION INTRAVENOUS at 12:06

## 2025-06-15 RX ADMIN — SUGAMMADEX 200 MG: 100 INJECTION, SOLUTION INTRAVENOUS at 02:06

## 2025-06-15 RX ADMIN — INSULIN ASPART 1 UNITS: 100 INJECTION, SOLUTION INTRAVENOUS; SUBCUTANEOUS at 10:06

## 2025-06-15 RX ADMIN — SUCRALFATE ORAL 1 G: 1 SUSPENSION ORAL at 08:06

## 2025-06-15 RX ADMIN — IPRATROPIUM BROMIDE 0.5 MG: 0.5 SOLUTION RESPIRATORY (INHALATION) at 07:06

## 2025-06-15 RX ADMIN — Medication 50 MG: at 12:06

## 2025-06-15 RX ADMIN — HYDROMORPHONE HYDROCHLORIDE 0.5 MG: 1 INJECTION, SOLUTION INTRAMUSCULAR; INTRAVENOUS; SUBCUTANEOUS at 10:06

## 2025-06-15 RX ADMIN — LEVOTHYROXINE SODIUM 112 MCG: 112 TABLET ORAL at 06:06

## 2025-06-15 RX ADMIN — MUPIROCIN 1 G: 20 OINTMENT TOPICAL at 08:06

## 2025-06-15 RX ADMIN — SUCRALFATE ORAL 1 G: 1 SUSPENSION ORAL at 10:06

## 2025-06-15 RX ADMIN — ONDANSETRON 4 MG: 2 INJECTION INTRAMUSCULAR; INTRAVENOUS at 12:06

## 2025-06-15 RX ADMIN — SODIUM CHLORIDE, SODIUM LACTATE, POTASSIUM CHLORIDE, AND CALCIUM CHLORIDE: .6; .31; .03; .02 INJECTION, SOLUTION INTRAVENOUS at 01:06

## 2025-06-15 NOTE — PLAN OF CARE
Assessment completed over phone with spouse. All demographic information verified as correct. Pt lives wit spouse in wheelchair accessible home. Pt utilizes CPAP and walker at home. Denies any current home health. PT/OT consulted with recommendations pending. Case management to continue to follow for needs.     [facility]  Initial Discharge Assessment       Primary Care Provider: Freida Warren MD    Admission Diagnosis: Pseudoaneurysm of left femoral artery [I72.4]  Pseudoaneurysm [I72.9]    Admission Date: 6/14/2025  Expected Discharge Date: 6/17/2025         Payor: MEDICARE / Plan: MEDICARE PART A & B / Product Type: Government /     Extended Emergency Contact Information  Primary Emergency Contact: TremaineclarkMarcus  Address: [address]           [place] [address] [address] [facility]  Home Phone: [phone]  Mobile Phone: [phone]  Relation: Spouse    Discharge Plan A: Home Health  Discharge Plan B: Home with family      [id number] [place] - [address]  [address]  [place] [address]  Phone: [phone] Fax: [phone]    [facility] - [place] [address] [facility]  [address] [address]  Phone: [phone] Fax: [phone]    [facility] - [place], [place] - [address]  [address]  [facility] [address]  [facility] [address]  Phone: [phone] Fax: [phone]      Initial Assessment (most recent)       Adult Discharge Assessment - 06/15/25 1226          Discharge Assessment    Assessment Type Discharge Planning Assessment     Confirmed/corrected address, phone number and insurance Yes     Confirmed Demographics Correct on Facesheet     Source of Information family     Communicated ЮЛИЯ with patient/caregiver Yes     People in Home spouse     Facility Arrived From: home.     Do you expect to return to your current living situation? Yes     Do you have help at  home or someone to help you manage your care at home? Yes     Who are your caregiver(s) and their phone number(s)? spouse     Prior to hospitilization cognitive status: Alert/Oriented     Walking or Climbing Stairs Difficulty yes     Walking or Climbing Stairs ambulation difficulty, requires equipment;stair climbing difficulty, requires equipment;transferring difficulty, requires equipment     Dressing/Bathing Difficulty no     Home Accessibility wheelchair accessible     Home Layout Able to live on 1st floor     Equipment Currently Used at Home walker, rolling;CPAP     Readmission within 30 days? Yes     Patient currently being followed by outpatient case management? No     Do you currently have service(s) that help you manage your care at home? No     Do you take prescription medications? Yes     Do you have prescription coverage? Yes     Do you have any problems affording any of your prescribed medications? No     Is the patient taking medications as prescribed? yes     Who is going to help you get home at discharge? spouse, Marcus     How do you get to doctors appointments? family or friend will provide     Are you on dialysis? No     Do you take coumadin? No     Discharge Plan A Home Health     Discharge Plan B Home with family     DME Needed Upon Discharge  none     Discharge Plan discussed with: Spouse/sig other

## 2025-06-15 NOTE — ANESTHESIA PROCEDURE NOTES
Central Line    Diagnosis: Pseudoaneurysm  Patient location during procedure: done in OR  Procedure Urgency: Emergent  Procedure start time: 6/15/2025 12:02 AM  Timeout: 6/15/2025 12:01 AM  Procedure end time: 6/15/2025 12:17 AM      Staffing  Authorizing Provider: Rich Martines MD  Performing Provider: Rich Martines MD    Staffing  Performed by: Rich Martines MD  Authorized by: Rich Martines MD    Anesthesiologist was present at the time of the procedure.  Preanesthetic Checklist  Completed: patient identified, IV checked, site marked, risks and benefits discussed, surgical consent, monitors and equipment checked, pre-op evaluation, timeout performed and anesthesia consent given  Indication   Indication: vascular access     Anesthesia   general anesthesia    Central Line   Skin Prep: skin prepped with ChloraPrep, skin prep agent completely dried prior to procedure  Sterile Barriers Followed: Yes    All five maximal barriers used- gloves, gown, cap, mask, and large sterile sheet    hand hygiene performed prior to central venous catheter insertion  Location: internal jugular, right internal jugular.   Catheter type: triple lumen  Catheter Size: 7 Fr  Inserted Catheter Length: 15 cm  Ultrasound: vascular probe with ultrasound   Vessel Caliber: medium, patent, compressibility normal  Needle advanced into vessel with real time Ultrasound guidance.  Guidewire confirmed in vessel.  sterile gel and probe cover used in ultrasound-guided central venous catheter insertion  Manometry: none  Insertion Attempts: 1   Securement:line sutured, chlorhexidine patch, sterile dressing applied and blood return through all ports    Post-Procedure   X-Ray: no pneumothorax on x-ray, placement verified by x-ray, tip termination and successful placement  Tip termination comments: SVC   Adverse Events:none      Guidewire Guidewire removed intact.   Additional Notes  No indication of arterial puncture at any time.  All ports  aspirated and flushed easily.

## 2025-06-15 NOTE — PROGRESS NOTES
Nocturnists    Subjective:  Patient now POD #0 from left femoral artery pseudoaneurysm repair per Vascular surgery.  Intraoperatively, she had received 2 units PRBCs.  Patient has had a stable/uncomplicated postoperative course.  She remains hemodynamically stable and now complains of 0/10 pain.  Her drain has only put out 35 cc    Objective:  Temperature 98° F, heart rate 80s, respiratory rate 22-23 breaths per minute, /69, sats 100% on 2 L nasal cannula; I/O:2369/1150    General: Patient awake but groggy, no distress   Neck: No retractions   Heart:  S1-S2, 3/6 systolic murmur, no gallop or rub   Lungs: Clear to auscultation bilaterally anteriorly, normal respiratory effort   Abdomen: Soft, decreased bowel sounds, no mass, no tenderness   Left groin:  Marked bruising still persists but markedly improved swelling; dressing in place  Extremities: No edema bilateral calves    Labs:  No leukocytosis; hemoglobin up to 11.6 (from 7.6) and platelets were normal; creatinine 2.1 (unchanged from last night)<-1.50 three days ago; albumin 2.7; Mag 1.3    Assessment/Plan:    Pseudoaneurysm of left femoral artery/Acute blood loss anemia, accept Essential hypertension     -In the context of recent angiography  -Rapidly progressing left inguinal swelling followed by precipitous drop in hemoglobin  -Protamine ordered by Vascular surgery-> required operative management  -Blood pressure/hemoglobin stable overnight  -Trend H/H q.4 hours  -Hold all anticoagulation until cleared by Vascular surgery: Aspirin/Lovenox; patient's Plavix had already been held previously (was supposed to have an EGD with dilation procedure, at some point)  -Home Percocet resumed  -Hold on IV pain medications until blood pressure improves (did receive prior to transfer)  -Appreciate Vascular surgery consult     Type 2 diabetes mellitus without complication, without long-term current use of insulin     -Sliding scale insulin per protocol  -Hemoglobin A1c  7.9 this month     History of breast cancer     -Hold home Ibrance/Femara for now until acute issues stabilize     Acquired hypothyroidism     -Home Synthroid/Cytomel resumed  -TSH nonsuppressed at 9.320 in February-> free T4 normal at 0.90 and TSH mildly suppressed at 0.321-> recheck in the outpatient venue     Acute on CKD (chronic kidney disease) stage 3, GFR 30-59 ml/min     -Per review of the chart, learned the patient's creatinine has been increasing over the last couple of days (baseline @ 1.5-1.7)  -Minimize nephrotoxic medications  -LR initiated by Vascular surgery  -Follow daily weights, strict Is&Os, and clinical exam  -Daily BMP->stable since last night      Tasha Rea MD

## 2025-06-15 NOTE — OP NOTE
[facility]  Vascular Surgery  Operative Note    SUMMARY     Date of Procedure: 6/14/2025     Procedure: Procedure(s) (LRB):  EVACUATION, HEMATOMA, LEFT GROIN (Left) , left femoral artery pseudoaneurysm repair    Surgeons and Role:     * Khoobehi, Ali, MD - Primary    Assisting Surgeon: None    Pre-Operative Diagnosis: Pseudoaneurysm of left femoral artery [I72.4]    Post-Operative Diagnosis: Post-Op Diagnosis Codes:     * Pseudoaneurysm of left femoral artery [I72.4]    Anesthesia: * No anesthesia type entered *    Operative Findings (including complications, if any): large hematoma    Description of Technical Procedures:   Indications, risks, and benefits of femoral artery PSA repair and hematoma evacuation were discussed with the patient. Risks discussed included possible bleeding, infection, cardiac arrest, limb loss, renal failure, stroke, and death, among other risks. Patient was agreeable for the procedure and signed consent.    Patient was brought to the operating theater and placed under general anesthesia. Bilateral groins and thighs was prepped and draped in a sterile fashion.    Longitudinal incision was made at the left groin.  A large hematoma was immediately encountered and about 600 cc of serous fluid and hematoma were evacuated.    Bleeding was encountered from the pseudoaneurysm and pressure held as dissection was continued down to the artery.  The bleeding appeared to be coming from the common femoral artery.  The common femoral artery was dissected out, and once we reached the area of bleeding, the bleeding appeared to have stopped but there was a visible defect.  6-0 Prolene suture was used to repair this hole to ensure no further bleeding from the pseudoaneurysm.    Wound was copiously irrigated.  There is a large potential space, and a #15 round drain was placed.      Groin wound was closed with 2-0 Vicryl, 3-0 Vicryl and 4-0 Monocryl. Skin was dressed with dermabond.    Patient  was brought to the ICU in stable condition.  She has Doppler DP signals bilaterally.    Significant Surgical Tasks Conducted by the Assistant(s), if Applicable: n/a    Estimated Blood Loss (EBL): 200 cc           Implants: * No implants in log *    Specimens:   Specimen (24h ago, onward)      None                    Condition: Good    Disposition: PACU - hemodynamically stable.    Attestation: I performed the procedure.

## 2025-06-15 NOTE — PROGRESS NOTES
Automatic Inhaler to Nebulizer Interchange    Tiotropium/Olodaterol (Stiolto) 5 mcg/5 mcg changed to Ipratropium 0.5 mg every 6 hours AND Arformoterol 15 mcg BID per [facility] Automatic Therapeutic Substitutions Protocol.    Please contact pharmacy at extension [phone] with any questions.     Thank you,   Gayatri Waite

## 2025-06-15 NOTE — CONSULTS
[facility]  Vascular Surgery  Consult Note    Inpatient consult to Vascular Surgery  Consult performed by: Khoobehi, Ali, MD  Consult ordered by: Tasha Rea MD        Subjective:     Chief Complaint/Reason for Admission: groin pseudoaneurysm    History of Present Illness:   69-year-old  female with a history of breast cancer, status post chemotherapy/radiation (currently on Imbrance/Femara), htn, severe CAD, COPD, DM, PAD, s/p right SFA shockwave apl on 6/9/25. Pt developed left groin pain and swelling this AM, and was diagnosed with a large left PSA and transferred as there were no in-house vascular services.    Pt was previously on Plavix but states she was started on Lovenox with a plan to transition her to Eliquis.    Prescriptions Prior to Admission[1]    Review of patient's allergies indicates:   Allergen Reactions    Bactrim [sulfamethoxazole-trimethoprim] Other (See Comments)     Cause tongue to turn black    Morphine Rash    Opioids - morphine analogues Anaphylaxis    Phenergan [promethazine] Other (See Comments) and Hallucinations    Betadine [povidone-iodine] Rash    Statins-hmg-coa reductase inhibitors Nausea And Vomiting and Other (See Comments)     GI upset and myalgias With Lipitor and Crestor    Zetia [ezetimibe] Nausea And Vomiting       Past Medical History:   Diagnosis Date    Cancer     right breast with mets to left hip     Chronic kidney disease, unspecified     Chronic kidney disease, unspecified     Coronary artery disease     Diabetes mellitus     Dizziness     Headache(784.0)     Hypertension     Hypothyroidism     Obesity     Otitis media     Renal disorder     CKD    Sleep apnea      Past Surgical History:   Procedure Laterality Date    ANGIOGRAM, CORONARY, WITH LEFT HEART CATHETERIZATION  5/27/2021    Procedure: Angiogram, Coronary, with Left Heart Cath;  Surgeon: Luis Henderson MD;  Location: [facility] CATH;  Service: Cardiovascular;;    ANGIOGRAM, EXTREMITY,  UNILATERAL  6/9/2025    Procedure: Rt. Leg angiogram;  Surgeon: Luis Pichardo MD;  Location: [facility] CATH;  Service: Cardiovascular;;    AORTOGRAPHY WITH SERIALOGRAPHY  6/9/2025    Procedure: Abdominal angiogram;  Surgeon: Luis Pichardo MD;  [facility];  Service: Cardiovascular;;    APPENDECTOMY      CARDIAC CATHETERIZATION      1 stent   in LAD    CHOLECYSTECTOMY      COLONOSCOPY      ESOPHAGOGASTRODUODENOSCOPY N/A 7/11/2024    Procedure: EGD (ESOPHAGOGASTRODUODENOSCOPY);  Surgeon: Frantz Loaiza MD;  Location: [facility] ENDO;  Service: Endoscopy;  Laterality: N/A;    INSTANTANEOUS WAVE-FREE RATIO (IFR)  8/1/2023    Procedure: (IFR) RCA;  Surgeon: Luis Pichardo MD;  Location: [facility] CATH;  Service: Cardiovascular;;    LEFT HEART CATHETERIZATION Left 7/2/2019    Procedure: Left heart cath;  Surgeon: Mirela Ivan MD;  [facility];  Service: Cardiology;  Laterality: Left;    LEFT HEART CATHETERIZATION Left 5/27/2021    Procedure: Left heart cath;  Surgeon: Luis Henderson MD;  Location: [facility] CATH;  Service: Cardiovascular;  Laterality: Left;    LEFT HEART CATHETERIZATION Left 9/20/2022    Procedure: Left heart cath;  Surgeon: Luis Henderson MD;  Location: [facility] CATH;  Service: Cardiovascular;  Laterality: Left;    LEFT HEART CATHETERIZATION  8/1/2023    Procedure: Left heart cath;  Surgeon: Luis Pichardo MD;  [facility];  Service: Cardiovascular;;    LEFT HEART CATHETERIZATION  4/15/2025    Procedure: Left heart cath;  Surgeon: Luis Pichardo MD;  Location: [facility] CATH;  Service: Cardiovascular;;    MASTECTOMY Bilateral     with bilateral reconstruction CHERY flaps    PERCUTANEOUS CORONARY INTERVENTION, ARTERY  4/15/2025    Procedure: PTCA OM;  Surgeon: Luis Pichardo MD;  Location: [facility] CATH;  Service: Cardiovascular;;    PTA, ARTERY, PERIPHERAL  6/9/2025    Procedure: PTA (Shockwave) / Atherectomy (CSI) Rt. SFA;   Surgeon: Luis Pichardo MD;  Location: [facility] CATH;  Service: Cardiovascular;;    RIGHT HEART CATHETERIZATION  8/1/2023    Procedure: Right heart cath;  Surgeon: Luis Pichardo MD;  Location: [facility] CATH;  Service: Cardiovascular;;     Family History       Problem Relation (Age of Onset)    Breast cancer Mother    COPD Father    Diabetes Mother    Heart disease Father    Prostate cancer Father          Tobacco Use    Smoking status: Never    Smokeless tobacco: Never   Substance and Sexual Activity    Alcohol use: No    Drug use: Never    Sexual activity: Not on file     Review of Systems  Objective:     Vital Signs (Most Recent):  Temp: 98.2 °F (36.8 °C) (06/14/25 1946)  Pulse: 99 (06/14/25 2150)  Resp: 18 (06/14/25 2150)  BP: (!) 109/52 (06/14/25 1946)  SpO2: 99 % (06/14/25 2150) Vital Signs (24h Range):  Temp:  [98.2 °F (36.8 °C)-99.5 °F (37.5 °C)] 98.2 °F (36.8 °C)  Pulse:  [] 99  Resp:  [15-19] 18  SpO2:  [99 %-100 %] 99 %  BP: ()/() 109/52     Weight: 96.2 kg (212 lb 1.3 oz)  Body mass index is 36.4 kg/m².        Physical Exam  Vitals and nursing note reviewed.   Constitutional:       Appearance: Normal appearance.   Cardiovascular:      Rate and Rhythm: Normal rate and regular rhythm.      Pulses:           Radial pulses are 2+ on the right side and 2+ on the left side.        Femoral pulses are 2+ on the right side.       Dorsalis pedis pulses are detected w/ Doppler on the right side and detected w/ Doppler on the left side.      Heart sounds: Normal heart sounds.      Comments: Large left groin hematoma with extensive ecchymoses  Pulmonary:      Effort: Pulmonary effort is normal.      Breath sounds: Normal breath sounds.   Abdominal:      Palpations: Abdomen is soft.      Tenderness: There is no abdominal tenderness.   Skin:     Capillary Refill: Capillary refill takes less than 2 seconds.   Neurological:      Mental Status: She is alert.         Significant  Labs:  CBC:   Recent Labs   Lab 06/14/25  2251   WBC 6.66   RBC 2.02*   HGB 7.6*  7.6*   HCT 23.4*  23.4*      *   MCH 37.6*   MCHC 32.5     CMP:   Recent Labs   Lab 06/14/25  1301   *   CALCIUM 9.2   ALBUMIN 3.2*   PROT 7.0      K 3.8   CO2 20*      BUN 10   CREATININE 1.83*   ALKPHOS 131   ALT 39   AST 43*   BILITOT 0.7       Significant Diagnostics:  CXR: No results found in the last 24 hours.    Assessment/Plan:     Pt is a 70 yo F with a large left groin PSA. It appears to have some flow but at the bedside with the US tech we are unable to identify the neck to safely inject the PSA due to the extensive hematoma.    I am concerned that it will continue to bleed despite protamine and recommend we take her to the OR for exploration. She may also benefit from evacuation of this sizable hematoma.    We had an extensive discussion and pt agrees for the procedure.    Active Diagnoses:    Diagnosis Date Noted POA    Pseudoaneurysm of left femoral artery [I72.4] 06/14/2025 Yes    Acute blood loss anemia [D62] 06/14/2025 Yes    CKD (chronic kidney disease) stage 3, GFR 30-59 ml/min [N18.30] 07/12/2024 Yes    Acquired hypothyroidism [E03.9] 05/24/2017 Yes    Essential hypertension [I10] 08/06/2015 Yes    Type 2 diabetes mellitus without complication, without long-term current use of insulin [E11.9] 08/06/2015 Yes    History of breast cancer [Z85.3] 08/06/2015 Not Applicable      Problems Resolved During this Admission:       Thank you for your consult. I will follow-up with patient. Please contact us if you have any additional questions.    Ali Khoobehi, MD  Vascular Surgery  [facility]         [1]   Medications Prior to Admission   Medication Sig Dispense Refill Last Dose/Taking    ALPRAZolam (XANAX) 0.5 MG tablet Take 0.5 mg by mouth as needed for Anxiety.       [Paused] amLODIPine (NORVASC) 5 MG tablet Take 1 tablet (5 mg total) by mouth once daily. 90 tablet 3      aspirin (ECOTRIN) 81 MG EC tablet Take 81 mg by mouth after lunch.       [Paused] clopidogreL (PLAVIX) 75 mg tablet Take 75 mg by mouth once daily.       enoxaparin (LOVENOX) 100 mg/mL Syrg Inject 0.9 mLs (90 mg total) into the skin every 12 (twelve) hours. for 5 days 9 mL 0     glyBURIDE-metformin 5-500 mg (GLUCOVANCE) 5-500 mg Tab Take 1 tablet by mouth 2 (two) times daily with meals. 60 tablet 3     LANTUS SOLOSTAR U-100 INSULIN 100 unit/mL (3 mL) InPn pen Inject 10 Units into the skin 2 (two) times a day.       letrozole (FEMARA) 2.5 mg Tab Take 1 tablet (2.5 mg total) by mouth once daily. 90 tablet 3     levothyroxine (SYNTHROID) 112 MCG tablet Take 112 mcg by mouth before breakfast.       liothyronine (CYTOMEL) 5 MCG Tab Take 10 mcg by mouth every evening.       metoprolol succinate (TOPROL-XL) 25 MG 24 hr tablet Take 1 tablet (25 mg total) by mouth after lunch. 90 tablet 3     neomycin-polymyxin-dexamethasone (DEXACINE) 3.5 mg/g-10,000 unit/g-0.1 % Oint Place 1 application  into both eyes 2 (two) times daily.       olmesartan (BENICAR) 5 MG Tab Take 1 tablet (5 mg total) by mouth once daily. 30 tablet 11     oxyCODONE-acetaminophen (PERCOCET)  mg per tablet Take 1 tablet by mouth every 8 (eight) hours as needed for Pain. (Patient taking differently: Take 1 tablet by mouth every 4 (four) hours as needed for Pain.) 90 tablet 0     palbociclib 125 mg Tab Take 125 mg by mouth once daily. On days 1-21 and 7 days off every 28 days 21 tablet 11     pantoprazole (PROTONIX) 40 MG tablet Take 1 tablet (40 mg total) by mouth 2 (two) times a day. (Patient taking differently: Take 40 mg by mouth once daily.) 60 tablet 0     STIOLTO RESPIMAT 2.5-2.5 mcg/actuation Mist INHALE 2 INHALATIONS BY MOUTH  INTO THE LUNGS ONCE DAILY  (CONTROLLER) (Patient taking differently: Inhale 2 puffs into the lungs once daily.) 12 g 3     sucralfate (CARAFATE) 100 mg/mL suspension Take 10 mLs (1 g total) by mouth every 6 (six) hours.  for 120 doses 1200 mL 0     ZINC GLUCONATE ORAL Take 50 mg by mouth 3 (three) times a week.

## 2025-06-15 NOTE — NURSING
Pt transferred from [facility]. Notified Dr. Khoobehi and Dr. Rea. Orders placed. Khoobehi at bedside. Pt brought down to OR and will transfer to ICU after. Report given to Saloni DODD.

## 2025-06-15 NOTE — TRANSFER OF CARE
"Anesthesia Transfer of Care Note    Patient: Yasmin Verde    Procedure(s) Performed: Procedure(s) (LRB):  EVACUATION, HEMATOMA, LEFT GROIN (Left)    Patient location: ICU    Anesthesia Type: general    Transport from OR: Transported from OR on 6-10 L/min O2 by face mask with adequate spontaneous ventilation    Post pain: adequate analgesia    Post assessment: no apparent anesthetic complications and tolerated procedure well    Post vital signs: stable    Level of consciousness: alert and awake    Nausea/Vomiting: no nausea/vomiting    Complications: none    Transfer of care protocol was followed    Last vitals: Visit Vitals  BP (!) 109/52 (BP Location: Left forearm, Patient Position: Lying)   Pulse 102   Temp 36.8 °C (98.2 °F) (Oral)   Resp (!) 28   Ht 5' 4" (1.626 m)   Wt 96.2 kg (212 lb 1.3 oz)   SpO2 100%   BMI 36.40 kg/m²     "

## 2025-06-15 NOTE — CONSULTS
Pt has a EGD scheduled with Dr. Loaiza for tomorrow at [facility] for moderate intermittent dysphagia.  Due to the urgent surgery last night she will not be able to have the EGD tomorrow.  She is tolerating a soft diet now.  We will reschedule the EGD with Dr. Loaiza at [facility] in the near future.

## 2025-06-15 NOTE — SUBJECTIVE & OBJECTIVE
Past Medical History:   Diagnosis Date    Cancer     right breast with mets to left hip     Chronic kidney disease, unspecified     Chronic kidney disease, unspecified     Coronary artery disease     Diabetes mellitus     Dizziness     Headache(784.0)     Hypertension     Hypothyroidism     Obesity     Otitis media     Renal disorder     CKD    Sleep apnea        Past Surgical History:   Procedure Laterality Date    ANGIOGRAM, CORONARY, WITH LEFT HEART CATHETERIZATION  5/27/2021    Procedure: Angiogram, Coronary, with Left Heart Cath;  Surgeon: Luis Henderson MD;  Location: [facility] CATH;  Service: Cardiovascular;;    ANGIOGRAM, EXTREMITY, UNILATERAL  6/9/2025    Procedure: Rt. Leg angiogram;  Surgeon: Luis Pichardo MD;  Location: [facility] CATH;  Service: Cardiovascular;;    AORTOGRAPHY WITH SERIALOGRAPHY  6/9/2025    Procedure: Abdominal angiogram;  Surgeon: Luis Pichardo MD;  Location: [facility]ATH;  Service: Cardiovascular;;    APPENDECTOMY      CARDIAC CATHETERIZATION      1 stent   in LAD    CHOLECYSTECTOMY      COLONOSCOPY      ESOPHAGOGASTRODUODENOSCOPY N/A 7/11/2024    Procedure: EGD (ESOPHAGOGASTRODUODENOSCOPY);  Surgeon: Frantz Loaiza MD;  Location: [facility] ENDO;  Service: Endoscopy;  Laterality: N/A;    INSTANTANEOUS WAVE-FREE RATIO (IFR)  8/1/2023    Procedure: (IFR) RCA;  Surgeon: Luis Pichardo MD;  Location: [facility] CATH;  Service: Cardiovascular;;    LEFT HEART CATHETERIZATION Left 7/2/2019    Procedure: Left heart cath;  Surgeon: Mirela Ivan MD;  Location: [facility]ATH;  Service: Cardiology;  Laterality: Left;    LEFT HEART CATHETERIZATION Left 5/27/2021    Procedure: Left heart cath;  Surgeon: Luis Henderson MD;  Location: [facility];  Service: Cardiovascular;  Laterality: Left;    LEFT HEART CATHETERIZATION Left 9/20/2022    Procedure: Left heart cath;  Surgeon: Luis Henderson MD;  Location: [facility];  Service: Cardiovascular;  Laterality: Left;    LEFT HEART  CATHETERIZATION  8/1/2023    Procedure: Left heart cath;  Surgeon: Luis Pichardo MD;  Location: [facility];  Service: Cardiovascular;;    LEFT HEART CATHETERIZATION  4/15/2025    Procedure: Left heart cath;  Surgeon: Luis Pichardo MD;  Location: [facility];  Service: Cardiovascular;;    MASTECTOMY Bilateral     with bilateral reconstruction CHERY flaps    PERCUTANEOUS CORONARY INTERVENTION, ARTERY  4/15/2025    Procedure: PTCA OM;  Surgeon: Luis Pichardo MD;  Location: [facility];  Service: Cardiovascular;;    PTA, ARTERY, PERIPHERAL  6/9/2025    Procedure: PTA (Shockwave) / Atherectomy (CSI) Rt. SFA;  Surgeon: Luis Pichardo MD;  Location: [facility];  Service: Cardiovascular;;    RIGHT HEART CATHETERIZATION  8/1/2023    Procedure: Right heart cath;  Surgeon: Luis Pichardo MD;  Location: [facility];  Service: Cardiovascular;;       Review of patient's allergies indicates:   Allergen Reactions    Bactrim [sulfamethoxazole-trimethoprim] Other (See Comments)     Cause tongue to turn black    Morphine Rash    Opioids - morphine analogues Anaphylaxis    Phenergan [promethazine] Other (See Comments) and Hallucinations    Betadine [povidone-iodine] Rash    Statins-hmg-coa reductase inhibitors Nausea And Vomiting and Other (See Comments)     GI upset and myalgias With Lipitor and Crestor    Zetia [ezetimibe] Nausea And Vomiting       Current Facility-Administered Medications on File Prior to Encounter   Medication    [COMPLETED] HYDROmorphone injection 0.5 mg    [COMPLETED] HYDROmorphone injection 1 mg    [COMPLETED] HYDROmorphone injection 1 mg    [COMPLETED] HYDROmorphone injection 1 mg    [COMPLETED] ondansetron injection 4 mg    [COMPLETED] sodium chloride 0.9% bolus 1,000 mL 1,000 mL     Current Outpatient Medications on File Prior to Encounter   Medication Sig    ALPRAZolam (XANAX) 0.5 MG tablet Take 0.5 mg by mouth as needed for Anxiety.    [Paused]  amLODIPine (NORVASC) 5 MG tablet Take 1 tablet (5 mg total) by mouth once daily.    aspirin (ECOTRIN) 81 MG EC tablet Take 81 mg by mouth after lunch.    [Paused] clopidogreL (PLAVIX) 75 mg tablet Take 75 mg by mouth once daily.    enoxaparin (LOVENOX) 100 mg/mL Syrg Inject 0.9 mLs (90 mg total) into the skin every 12 (twelve) hours. for 5 days    glyBURIDE-metformin 5-500 mg (GLUCOVANCE) 5-500 mg Tab Take 1 tablet by mouth 2 (two) times daily with meals.    LANTUS SOLOSTAR U-100 INSULIN 100 unit/mL (3 mL) InPn pen Inject 10 Units into the skin 2 (two) times a day.    letrozole (FEMARA) 2.5 mg Tab Take 1 tablet (2.5 mg total) by mouth once daily.    levothyroxine (SYNTHROID) 112 MCG tablet Take 112 mcg by mouth before breakfast.    liothyronine (CYTOMEL) 5 MCG Tab Take 10 mcg by mouth every evening.    metoprolol succinate (TOPROL-XL) 25 MG 24 hr tablet Take 1 tablet (25 mg total) by mouth after lunch.    neomycin-polymyxin-dexamethasone (DEXACINE) 3.5 mg/g-10,000 unit/g-0.1 % Oint Place 1 application  into both eyes 2 (two) times daily.    olmesartan (BENICAR) 5 MG Tab Take 1 tablet (5 mg total) by mouth once daily.    oxyCODONE-acetaminophen (PERCOCET)  mg per tablet Take 1 tablet by mouth every 8 (eight) hours as needed for Pain. (Patient taking differently: Take 1 tablet by mouth every 4 (four) hours as needed for Pain.)    palbociclib 125 mg Tab Take 125 mg by mouth once daily. On days 1-21 and 7 days off every 28 days    pantoprazole (PROTONIX) 40 MG tablet Take 1 tablet (40 mg total) by mouth 2 (two) times a day. (Patient taking differently: Take 40 mg by mouth once daily.)    STIOLTO RESPIMAT 2.5-2.5 mcg/actuation Mist INHALE 2 INHALATIONS BY MOUTH  INTO THE LUNGS ONCE DAILY  (CONTROLLER) (Patient taking differently: Inhale 2 puffs into the lungs once daily.)    sucralfate (CARAFATE) 100 mg/mL suspension Take 10 mLs (1 g total) by mouth every 6 (six) hours. for 120 doses    ZINC GLUCONATE ORAL Take 50  mg by mouth 3 (three) times a week.    [DISCONTINUED] albuterol (PROVENTIL/VENTOLIN HFA) 90 mcg/actuation inhaler Inhale 1-2 puffs into the lungs every 6 (six) hours as needed for Wheezing or Shortness of Breath (chest tightness). Rescue    [DISCONTINUED] blood sugar diagnostic Strp To check BG 3-4 times daily, to use with insurance preferred meter    [DISCONTINUED] blood-glucose meter kit To check BG 3 - 4 times daily, to use with insurance preferred meter    [DISCONTINUED] cyanocobalamin 500 MCG tablet Take 2 tablets (1,000 mcg total) by mouth Daily. Not everyday    [DISCONTINUED] evolocumab (REPATHA SURECLICK) 140 mg/mL PnIj Inject 1 mL (140 mg total) into the skin every 14 (fourteen) days. Resume Repatha    [DISCONTINUED] lancets Misc To check BG 3-4 times daily, to use with insurance preferred meter     Family History       Problem Relation (Age of Onset)    Breast cancer Mother    COPD Father    Diabetes Mother    Heart disease Father    Prostate cancer Father          Tobacco Use    Smoking status: Never    Smokeless tobacco: Never   Substance and Sexual Activity    Alcohol use: No    Drug use: Never    Sexual activity: Not on file     Review of Systems   Constitutional:  Negative for chills, fever and unexpected weight change.   HENT:  Negative for rhinorrhea and sore throat.    Respiratory:  Negative for shortness of breath.    Cardiovascular:  Negative for chest pain and leg swelling.   Gastrointestinal:  Positive for abdominal pain (Preceded left inguinal swelling). Negative for blood in stool, constipation, diarrhea, nausea and vomiting.   Genitourinary:  Negative for dysuria.   Musculoskeletal:  Negative for myalgias.   Skin:  Positive for color change (Patient had erythema and purplish discoloration of her right foot and this is improved after the angiography procedure). Negative for rash.   Neurological:  Negative for numbness.   Hematological:  Negative for adenopathy.        Patient with  "progressively enlarging swelling of her left inguinal area but no discrete lymphadenopathy was noted     Objective:     Vital Signs (Most Recent):  Temp: 98.2 °F (36.8 °C) (06/14/25 1946)  Pulse: 99 (06/14/25 2150)  Resp: 18 (06/14/25 2150)  BP: (!) 109/52 (06/14/25 1946)  SpO2: 99 % (06/14/25 2150) Vital Signs (24h Range):  Temp:  [98.2 °F (36.8 °C)-99.5 °F (37.5 °C)] 98.2 °F (36.8 °C)  Pulse:  [] 99  Resp:  [15-19] 18  SpO2:  [99 %-100 %] 99 %  BP: ()/() 109/52     Weight: 96.2 kg (212 lb 1.3 oz)  Body mass index is 36.4 kg/m².     Physical Exam  Constitutional:       General: She is not in acute distress.     Appearance: Normal appearance. She is not ill-appearing, toxic-appearing or diaphoretic.   HENT:      Head: Normocephalic and atraumatic.   Eyes:      General: No scleral icterus.  Neck:      Comments: No JVD/retractions  Cardiovascular:      Rate and Rhythm: Regular rhythm.      Heart sounds: Murmur (3/6 systolic murmur) heard.      No friction rub. No gallop.      Comments: Intact dorsalis pedis pulses bilaterally, but left is greater than right  Pulmonary:      Effort: Pulmonary effort is normal. No respiratory distress.      Breath sounds: Normal breath sounds.   Abdominal:      General: Bowel sounds are normal. There is no distension.      Palpations: Abdomen is soft. There is no mass.      Tenderness: There is no abdominal tenderness.   Musculoskeletal:      Right lower leg: No edema.      Left lower leg: No edema.      Comments: Large, several cm firm area of bruising ("knot") in the left inguinal area.  Right foot is mildly erythematous and there is purple discoloration on the distal aspect of the 3rd toe (patient reports that coloration is actually improved)   Skin:     General: Skin is warm and dry.      Coloration: Skin is pale. Skin is not jaundiced.   Neurological:      Mental Status: She is alert.      Cranial Nerves: Cranial nerve deficit: Olfactory nerves not assessed.      " Motor: Weakness: Normal strength in all 4 extremities.      Deep Tendon Reflexes: Abnormal reflex: Normal symmetric reflexes bilaterally in the biceps/patellar reflexes.   Psychiatric:         Behavior: Behavior normal.         Thought Content: Thought content normal.         Judgment: Judgment normal.                Significant studies: Reviewed.

## 2025-06-15 NOTE — CARE UPDATE
06/14/25 3010   Patient Assessment/Suction   Level of Consciousness (AVPU) alert   Respiratory Effort Normal   Expansion/Accessory Muscles/Retractions no use of accessory muscles   Rhythm/Pattern, Respiratory unlabored   Cough Frequency no cough   PRE-TX-O2   Device (Oxygen Therapy) room air   SpO2 99 %   Pulse Oximetry Type Continuous   $ Pulse Oximetry - Multiple Charge Pulse Oximetry - Multiple   Pulse 99   Resp 18   Education   $ Education 15 min   Respiratory Evaluation   $ Care Plan Tech Time 15 min

## 2025-06-15 NOTE — ANESTHESIA PROCEDURE NOTES
Intubation    Date/Time: 6/14/2025 11:52 PM    Performed by: Tracy William CRNA  Authorized by: Rich Martines MD    Intubation:     Induction:  Intravenous    Intubated:  Postinduction    Mask Ventilation:  N/a (RSI)    Attempts:  1    Attempted By:  CRNA    Method of Intubation:  Video laryngoscopy    Blade:  Wang 3    Laryngeal View Grade: Grade I - full view of cords      Difficult Airway Encountered?: No      Complications:  None    Airway Device:  Oral endotracheal tube    Airway Device Size:  7.5    Style/Cuff Inflation:  Cuffed    Secured at:  The lips    Placement Verified By:  Capnometry    Complicating Factors:  None    Findings Post-Intubation:  BS equal bilateral and atraumatic/condition of teeth unchanged

## 2025-06-15 NOTE — H&P
"  [facility] Medicine  History & Physical    Patient Name: Yasmin Verde  MRN: [id number]  Patient Class: IP- Inpatient  Admission Date: 6/14/2025  Attending Physician: Tasha Rea MD  Primary Care Provider: Freida Warren MD    Patient seen at 9:06 p.m. on 06/14/2025.  History is obtained from the patient/records/nursing staff  Subjective:     Principal Problem:  Left common femoral artery pseudoaneurysm    Chief Complaint:  Rapidly progressing left groin knot     HPI: 69-year-old  female with a history of breast cancer, status post chemotherapy/radiation (currently on Imbrance/Femara), hypertension, obstructive sleep apnea (does not use device), hypothyroidism, diabetes, and peripheral arterial disease  -The patient has chronic "neuropathy" in her feet and she had progressively worsening symptoms with right foot erythema and swollen toes-> at some point, she had purplish discoloration  -She was recently admitted at [facility] (6/6-6/12) and during that time underwent abdominal angiogram with shockwave/atherectomy of the right SFA and right leg angiogram  -The patient had improvement in her PAD symptoms in that right foot (as coloration has improved as well as numbness) but on the night prior to presentation, she had developed lower abdominal pain  -This morning she developed late that a "knot" in her left groin (where the angiogram occurred) which has gotten progressively larger  -At [facility], she had a low-grade temperature of 99.5° F and heart rate was in the 110s (later 80s); she was hemodynamically stable with 100% sats  -Hemoglobin went from 10.1-> 7.7 in a matter of 4 hours; creatinine was 1.83 (baseline) and CO2 is 20 with an anion gap of 17 (glucose was 177)  -Left lower extremity arterial ultrasound showed, per radiologist Dr. Mendoza:    "Large multilobulated left common femoral artery pseudoaneurysm with 5 mm neck."    -Patient " received, in total, 3.5 mg of Dilaudid, a L of fluid, and Zofran was transferred here for further diagnosis, treatment, and care  -While here, she is afebrile and hemodynamically stable although at one point she did drop her pressure to 92/42 (MAP 59)-> protamine is forthcoming as per Vascular surgery who is aware of her case and facilitated transferring her here to us  -Repeat CBC/CMP are pending as well as coags and type and screen    Past Medical History:   Diagnosis Date    Cancer     right breast with mets to left hip     Chronic kidney disease, unspecified     Chronic kidney disease, unspecified     Coronary artery disease     Diabetes mellitus     Dizziness     Headache(784.0)     Hypertension     Hypothyroidism     Obesity     Otitis media     Renal disorder     CKD    Sleep apnea        Past Surgical History:   Procedure Laterality Date    ANGIOGRAM, CORONARY, WITH LEFT HEART CATHETERIZATION  5/27/2021    Procedure: Angiogram, Coronary, with Left Heart Cath;  Surgeon: Luis Henderson MD;  Location: [facility] CATH;  Service: Cardiovascular;;    ANGIOGRAM, EXTREMITY, UNILATERAL  6/9/2025    Procedure: Rt. Leg angiogram;  Surgeon: Luis Pichardo MD;  Location: [facility] CATH;  Service: Cardiovascular;;    AORTOGRAPHY WITH SERIALOGRAPHY  6/9/2025    Procedure: Abdominal angiogram;  Surgeon: Luis Pichardo MD;  [facility];  Service: Cardiovascular;;    APPENDECTOMY      CARDIAC CATHETERIZATION      1 stent   in LAD    CHOLECYSTECTOMY      COLONOSCOPY      ESOPHAGOGASTRODUODENOSCOPY N/A 7/11/2024    Procedure: EGD (ESOPHAGOGASTRODUODENOSCOPY);  Surgeon: Frantz Loaiza MD;  Location: [facility] ENDO;  Service: Endoscopy;  Laterality: N/A;    INSTANTANEOUS WAVE-FREE RATIO (IFR)  8/1/2023    Procedure: (IFR) RCA;  Surgeon: Luis Pichardo MD;  Location: [facility] CATH;  Service: Cardiovascular;;    LEFT HEART CATHETERIZATION Left 7/2/2019    Procedure: Left heart cath;  Surgeon: Mirela  MD Moncho;  Location: [facility] CATH;  Service: Cardiology;  Laterality: Left;    LEFT HEART CATHETERIZATION Left 5/27/2021    Procedure: Left heart cath;  Surgeon: Luis Henderson MD;  Location: [facility] CATH;  Service: Cardiovascular;  Laterality: Left;    LEFT HEART CATHETERIZATION Left 9/20/2022    Procedure: Left heart cath;  Surgeon: Luis Henderson MD;  Location: [facility] CATH;  Service: Cardiovascular;  Laterality: Left;    LEFT HEART CATHETERIZATION  8/1/2023    Procedure: Left heart cath;  Surgeon: Luis Pichardo MD;  Location: [facility] CATH;  Service: Cardiovascular;;    LEFT HEART CATHETERIZATION  4/15/2025    Procedure: Left heart cath;  Surgeon: Luis Pichardo MD;  Location: [facility] CATH;  Service: Cardiovascular;;    MASTECTOMY Bilateral     with bilateral reconstruction CHERY flaps    PERCUTANEOUS CORONARY INTERVENTION, ARTERY  4/15/2025    Procedure: PTCA OM;  Surgeon: Luis Pichardo MD;  Location: [facility] CATH;  Service: Cardiovascular;;    PTA, ARTERY, PERIPHERAL  6/9/2025    Procedure: PTA (Shockwave) / Atherectomy (CSI) Rt. SFA;  Surgeon: Luis Pichardo MD;  Location: [facility] CATH;  Service: Cardiovascular;;    RIGHT HEART CATHETERIZATION  8/1/2023    Procedure: Right heart cath;  Surgeon: Luis Pichardo MD;  Location: [facility] CATH;  Service: Cardiovascular;;       Review of patient's allergies indicates:   Allergen Reactions    Bactrim [sulfamethoxazole-trimethoprim] Other (See Comments)     Cause tongue to turn black    Morphine Rash    Opioids - morphine analogues Anaphylaxis    Phenergan [promethazine] Other (See Comments) and Hallucinations    Betadine [povidone-iodine] Rash    Statins-hmg-coa reductase inhibitors Nausea And Vomiting and Other (See Comments)     GI upset and myalgias With Lipitor and Crestor    Zetia [ezetimibe] Nausea And Vomiting       Current Facility-Administered Medications on File Prior to Encounter   Medication    [COMPLETED]  HYDROmorphone injection 0.5 mg    [COMPLETED] HYDROmorphone injection 1 mg    [COMPLETED] HYDROmorphone injection 1 mg    [COMPLETED] HYDROmorphone injection 1 mg    [COMPLETED] ondansetron injection 4 mg    [COMPLETED] sodium chloride 0.9% bolus 1,000 mL 1,000 mL     Current Outpatient Medications on File Prior to Encounter   Medication Sig    ALPRAZolam (XANAX) 0.5 MG tablet Take 0.5 mg by mouth as needed for Anxiety.    [Paused] amLODIPine (NORVASC) 5 MG tablet Take 1 tablet (5 mg total) by mouth once daily.    aspirin (ECOTRIN) 81 MG EC tablet Take 81 mg by mouth after lunch.    [Paused] clopidogreL (PLAVIX) 75 mg tablet Take 75 mg by mouth once daily.    enoxaparin (LOVENOX) 100 mg/mL Syrg Inject 0.9 mLs (90 mg total) into the skin every 12 (twelve) hours. for 5 days    glyBURIDE-metformin 5-500 mg (GLUCOVANCE) 5-500 mg Tab Take 1 tablet by mouth 2 (two) times daily with meals.    LANTUS SOLOSTAR U-100 INSULIN 100 unit/mL (3 mL) InPn pen Inject 10 Units into the skin 2 (two) times a day.    letrozole (FEMARA) 2.5 mg Tab Take 1 tablet (2.5 mg total) by mouth once daily.    levothyroxine (SYNTHROID) 112 MCG tablet Take 112 mcg by mouth before breakfast.    liothyronine (CYTOMEL) 5 MCG Tab Take 10 mcg by mouth every evening.    metoprolol succinate (TOPROL-XL) 25 MG 24 hr tablet Take 1 tablet (25 mg total) by mouth after lunch.    neomycin-polymyxin-dexamethasone (DEXACINE) 3.5 mg/g-10,000 unit/g-0.1 % Oint Place 1 application  into both eyes 2 (two) times daily.    olmesartan (BENICAR) 5 MG Tab Take 1 tablet (5 mg total) by mouth once daily.    oxyCODONE-acetaminophen (PERCOCET)  mg per tablet Take 1 tablet by mouth every 8 (eight) hours as needed for Pain. (Patient taking differently: Take 1 tablet by mouth every 4 (four) hours as needed for Pain.)    palbociclib 125 mg Tab Take 125 mg by mouth once daily. On days 1-21 and 7 days off every 28 days    pantoprazole (PROTONIX) 40 MG tablet Take 1 tablet (40  mg total) by mouth 2 (two) times a day. (Patient taking differently: Take 40 mg by mouth once daily.)    STIOLTO RESPIMAT 2.5-2.5 mcg/actuation Mist INHALE 2 INHALATIONS BY MOUTH  INTO THE LUNGS ONCE DAILY  (CONTROLLER) (Patient taking differently: Inhale 2 puffs into the lungs once daily.)    sucralfate (CARAFATE) 100 mg/mL suspension Take 10 mLs (1 g total) by mouth every 6 (six) hours. for 120 doses    ZINC GLUCONATE ORAL Take 50 mg by mouth 3 (three) times a week.    [DISCONTINUED] albuterol (PROVENTIL/VENTOLIN HFA) 90 mcg/actuation inhaler Inhale 1-2 puffs into the lungs every 6 (six) hours as needed for Wheezing or Shortness of Breath (chest tightness). Rescue    [DISCONTINUED] blood sugar diagnostic Strp To check BG 3-4 times daily, to use with insurance preferred meter    [DISCONTINUED] blood-glucose meter kit To check BG 3 - 4 times daily, to use with insurance preferred meter    [DISCONTINUED] cyanocobalamin 500 MCG tablet Take 2 tablets (1,000 mcg total) by mouth Daily. Not everyday    [DISCONTINUED] evolocumab (REPATHA SURECLICK) 140 mg/mL PnIj Inject 1 mL (140 mg total) into the skin every 14 (fourteen) days. Resume Repatha    [DISCONTINUED] lancets Misc To check BG 3-4 times daily, to use with insurance preferred meter     Family History       Problem Relation (Age of Onset)    Breast cancer Mother    COPD Father    Diabetes Mother    Heart disease Father    Prostate cancer Father          Tobacco Use    Smoking status: Never    Smokeless tobacco: Never   Substance and Sexual Activity    Alcohol use: No    Drug use: Never          Review of Systems   Constitutional:  Negative for chills, fever and unexpected weight change.   HENT:  Negative for rhinorrhea and sore throat.    Respiratory:  Negative for shortness of breath.    Cardiovascular:  Negative for chest pain and leg swelling.   Gastrointestinal:  Positive for abdominal pain (Preceded left inguinal swelling). Negative for blood in stool,  "constipation, diarrhea, nausea and vomiting.   Genitourinary:  Negative for dysuria.   Musculoskeletal:  Negative for myalgias.   Skin:  Positive for color change (Patient had erythema and purplish discoloration of her right foot and this is improved after the angiography procedure). Negative for rash.   Neurological:  Negative for numbness.   Hematological:  Negative for adenopathy.        Patient with progressively enlarging swelling of her left inguinal area but no discrete lymphadenopathy was noted     Objective:     Vital Signs (Most Recent):  Temp: 98.2 °F (36.8 °C) (06/14/25 1946)  Pulse: 99 (06/14/25 2150)  Resp: 18 (06/14/25 2150)  BP: (!) 109/52 (06/14/25 1946)  SpO2: 99 % (06/14/25 2150) Vital Signs (24h Range):  Temp:  [98.2 °F (36.8 °C)-99.5 °F (37.5 °C)] 98.2 °F (36.8 °C)  Pulse:  [] 99  Resp:  [15-19] 18  SpO2:  [99 %-100 %] 99 %  BP: ()/() 109/52     Weight: 96.2 kg (212 lb 1.3 oz)  Body mass index is 36.4 kg/m².     Physical Exam  Constitutional:       General: She is not in acute distress.     Appearance: Normal appearance. She is not ill-appearing, toxic-appearing or diaphoretic.   HENT:      Head: Normocephalic and atraumatic.   Eyes:      General: No scleral icterus.  Neck:      Comments: No JVD/retractions  Cardiovascular:      Rate and Rhythm: Regular rhythm.      Heart sounds: Murmur (3/6 systolic murmur) heard.      No friction rub. No gallop.      Comments: Intact dorsalis pedis pulses bilaterally, but left is greater than right  Pulmonary:      Effort: Pulmonary effort is normal. No respiratory distress.      Breath sounds: Normal breath sounds.   Abdominal:      General: Bowel sounds are normal. There is no distension.      Palpations: Abdomen is soft. There is no mass.      Tenderness: There is no abdominal tenderness.   Musculoskeletal:      Right lower leg: No edema.      Left lower leg: No edema.      Comments: Large, several cm firm area of bruising ("knot") in the " left inguinal area.  Right foot is mildly erythematous and there is purple discoloration on the distal aspect of the 3rd toe (patient reports that coloration is actually improved)   Skin:     General: Skin is warm and dry.      Coloration: Skin is pale. Skin is not jaundiced.   Neurological:      Mental Status: She is alert.   Psychiatric:         Behavior: Behavior normal.         Thought Content: Thought content normal.         Judgment: Judgment normal.     Significant studies: Reviewed.  Assessment/Plan:     Assessment & Plan    Pseudoaneurysm of left femoral artery/Acute blood loss anemia, accept Essential hypertension    -In the context of recent angiography  -Rapidly progressing left inguinal swelling throughout the day, now was precipitous drop in hemoglobin, and blood pressure just dropped; for now:  -Protamine as ordered by Vascular surgery  -Hold antihypertensives  -Check stat CBC/CMP/coags/type and screen-> transfuse for hemoglobin less than 7.0 and/or development of hypotension  -Trend H/H q.4 hours  -Hold all anticoagulation: Aspirin/Lovenox; patient's Plavix had already been held previously (was supposed to have an EGD with dilation procedure, at some point)  -Home Percocet resumed  -Hold on IV pain medications until blood pressure improves (did receive prior to transfer)  -Vascular surgery consult    Type 2 diabetes mellitus without complication, without long-term current use of insulin    -Sliding scale insulin per protocol  -Hemoglobin A1c 7.9 this month    History of breast cancer    -Hold home Ibrance/Femara for now until acute issues stabilize    Acquired hypothyroidism    -Home Synthroid/Cytomel resumed  -TSH nonsuppressed at 9.320 in February-> will recheck TSH and add free T4    CKD (chronic kidney disease) stage 3, GFR 30-59 ml/min    -At baseline  -Minimize nephrotoxic medications  -Follow daily weights, strict Is&Os, and clinical exam  -Daily BMP       Tasha Rea MD  Department of  [facility] Medicine  [facility]

## 2025-06-15 NOTE — ASSESSMENT & PLAN NOTE
Sliding scale insulin per protocol  Add lantus BID as taken at home   Hemoglobin A1c 7.9 this month

## 2025-06-15 NOTE — ASSESSMENT & PLAN NOTE
At baseline  Minimize nephrotoxic medications  Follow daily weights, strict Is&Os, and clinical exam  Daily BMP

## 2025-06-15 NOTE — Clinical Note
Pt transferred from [facility]. Notified Dr. Khoobehi and Dr. Rea. Dr. Khoobehi en route to hospital and placed orders. Pt brought down to OR and will transfer to ICU after. Report given to ICU nurse.

## 2025-06-15 NOTE — CARE UPDATE
06/15/25 0237   Patient Assessment/Suction   Level of Consciousness (AVPU) alert   Respiratory Effort Unlabored   Expansion/Accessory Muscles/Retractions expansion symmetric   All Lung Fields Breath Sounds clear   Rhythm/Pattern, Respiratory depth regular;pattern regular   Cough Frequency infrequent   Cough Type good;nonproductive   PRE-TX-O2   Device (Oxygen Therapy) nasal cannula   $ Is the patient on Low Flow Oxygen? Yes   Flow (L/min) (Oxygen Therapy) 2   SpO2 100 %   Pulse Oximetry Type Continuous   $ Pulse Oximetry - Multiple Charge Pulse Oximetry - Multiple   Pulse 76   Resp (!) 22   Education   $ Education Oxygen;15 min

## 2025-06-15 NOTE — HPI
"69-year-old  female with a history of breast cancer, status post chemotherapy/radiation (currently on Imbrance/Femara), hypertension, obstructive sleep apnea (does not use device), hypothyroidism, diabetes, and peripheral arterial disease  The patient has chronic "neuropathy" in her feet and she had progressively worsening symptoms with right foot erythema and swollen told-> at some point, she had purplish discoloration  She was recently admitted at [facility] (6/6-6/12) and during that time underwent abdominal angiogram with shockwave/atherectomy of the right SFA and right leg angiogram  The patient had improvement in her PID symptoms but on the night prior to presentation, she had developed lower abdominal pain and then this morning had a "knot" in her left groin (where they angiogram occurred) that has gotten progressively larger  At [facility], she had a low-grade temperature of 99.5° F and heart rate was in the 110s (later 80s); she was hemodynamically stable with 100% sats  Hemoglobin went from 10.1-> 7.7 in a matter of 4 hours; creatinine was 1.83 (baseline) and CO2 is 20 with an anion gap of 17 (glucose was 177)  Left lower extremity arterial ultrasound showed, per radiologist Dr. Mendoza:    "Large multilobulated left common femoral artery pseudoaneurysm with 5 mm neck."    Patient received, in total, 3.5 mg of Dilaudid, a L of fluid, and Zofran was transferred here for further diagnosis, treatment, and care  While here, she is afebrile and hemodynamically stable although at 1 point she did drop her pressure 92/42 (MAP 59)-> protamine is forthcoming as per Vascular surgery who is aware of her case and facilitated transferring her here to us  Repeat CBC/CMP are pending as well as coags and type and screen  "

## 2025-06-15 NOTE — ANESTHESIA POSTPROCEDURE EVALUATION
Anesthesia Post Evaluation    Patient: Yasmin Verde    Procedure(s) Performed: Procedure(s) (LRB):  EVACUATION, HEMATOMA, LEFT GROIN (Left)    Final Anesthesia Type: general      Patient location during evaluation: ICU  Patient participation: Yes- Able to Participate  Level of consciousness: awake and alert  Post-procedure vital signs: reviewed and stable  Pain management: adequate  Airway patency: patent  LALO mitigation strategies: Extubation while patient is awake, Multimodal analgesia and Extubation and recovery carried out in lateral, semiupright, or other nonsupine position  PONV status at discharge: No PONV  Anesthetic complications: no      Cardiovascular status: stable  Respiratory status: unassisted and spontaneous ventilation  Hydration status: euvolemic  Follow-up not needed.              Vitals Value Taken Time   /55 06/15/25 08:00   Temp 36.7 °C (98 °F) 06/15/25 04:30   Pulse 97 06/15/25 08:19   Resp 25 06/15/25 08:19   SpO2 96 % 06/15/25 08:19   Vitals shown include unfiled device data.      No case tracking events are documented in the log.      Pain/Rosa Elena Score: Pain Rating Prior to Med Admin: 9 (6/14/2025  6:16 PM)

## 2025-06-15 NOTE — ASSESSMENT & PLAN NOTE
In the context of recent angiography  Rapidly progressing left inguinal swelling throughout the day, now was precipitous drop in hemoglobin; for now:  Protamine as ordered by vascular surgery  Check stat CBC/CMP/coags/type and screen-> transfuse for hemoglobin less than 7.0 and/or development of hypotension  Trend H/H daily   Plavix and eliquis initiated   Vascular surgery consult  S/p evacuation of hematoma 6/14

## 2025-06-15 NOTE — ANESTHESIA PREPROCEDURE EVALUATION
06/14/2025  Yasmin Verde is a 69 y.o., female.      Pre-op Assessment    I have reviewed the Patient Summary Reports.     I have reviewed the Nursing Notes. I have reviewed the NPO Status.   I have reviewed the Medications.     Review of Systems  Anesthesia Hx:    Contrary to allergy list, patient states that the only opioid she has had problems with in the past was morphine.  She tolerated Dilaudid earlier today and takes Percocet 10 mg every night for painful neuropathy of her feet.           Denies Family Hx of Anesthesia complications.    Denies Personal Hx of Anesthesia complications.                    Social:  Non-Smoker, No Alcohol Use       Hematology/Oncology:       -- Anemia (acute):               Hematology Comments: Plavix discontinued 4 days ago with Lovenox bridge up until this morning, for upcoming esophageal dilation.      --  Cancer in past history (bilateral mastectomy for right breast cancer, with right lymph node dissection):                     EENT/Dental:  EENT/Dental Normal           Cardiovascular:     Hypertension Valvular problems/Murmurs (mild AS)  CAD  asymptomatic CABG/stent (History of 10 coronary stents.  Balloon angioplasty in April of 2025.)      Denies Orthopnea.  PVD     Left femoral pseudoaneurysm status post recent angiogram per left groin.  05/13/2025 echo shows 70% EF, grade 2 LV diastolic dysfunction, and mild AS                           Pulmonary:       Denies Shortness of breath.  Sleep Apnea, CPAP Occasional inhaler use for diaphragm dysfunction.  Patient denies any lung disease.          Education provided regarding risk of obstructive sleep apnea            Renal/:  Chronic Renal Disease                Hepatic/GI:        History of dysphagia requiring esophageal dilation.  NPO for the past 7 hours and only clear liquids before then.              Musculoskeletal:  Musculoskeletal Normal                Neurological:      Headaches           Peripheral Neuropathy (Feet)                          Endocrine:  Diabetes Hypothyroidism        Obesity / BMI > 30  Psych:  Psychiatric Normal                    Physical Exam  General: Cooperative, Alert and Oriented    Airway:  Mallampati: III   Mouth Opening: Normal  TM Distance: > 6 cm  Tongue: Normal  Neck ROM: Normal ROM    Dental:  Intact    Chest/Lungs:  Clear to auscultation, Normal Respiratory Rate    Heart:  Rate: Normal  Rhythm: Regular Rhythm  Sounds: Normal  Murmur: Systolic;        Anesthesia Plan  Type of Anesthesia, risks & benefits discussed:    Anesthesia Type: Gen ETT  Intra-op Monitoring Plan: Standard ASA Monitors and Art Line  Post Op Pain Control Plan: multimodal analgesia  Induction:  IV  Airway Plan: Video, Post-Induction  Informed Consent: Informed consent signed with the Patient and all parties understand the risks and agree with anesthesia plan.  All questions answered.   ASA Score: 4 Emergent  Anesthesia Plan Notes: GETA, etomidate  Arterial line and possible central line  Sleep apnea precautions: No Versed, minimize opioids, extubate awake and sitting up with oral airway in place  Multimodal analgesia:  IV acetaminophen, ketamine  Antiemetics:  Zofran, Pepcid, Benadryl 6.25 mg  No Decadron        Ready For Surgery From Anesthesia Perspective.     .

## 2025-06-15 NOTE — ASSESSMENT & PLAN NOTE
Home Synthroid/Cytomel resumed  TSH nonsuppressed at 9.320 in February-> will recheck TSH and add free T4

## 2025-06-16 LAB
ABSOLUTE EOSINOPHIL (SMH): 0.17 K/UL
ABSOLUTE MONOCYTE (SMH): 0.8 K/UL (ref 0.3–1)
ABSOLUTE NEUTROPHIL COUNT (SMH): 3.4 K/UL (ref 1.8–7.7)
ALBUMIN SERPL-MCNC: 2.5 G/DL (ref 3.5–5.2)
ALP SERPL-CCNC: 111 UNIT/L (ref 55–135)
ALT SERPL-CCNC: 26 UNIT/L (ref 10–44)
ANION GAP (SMH): 9 MMOL/L (ref 8–16)
AST SERPL-CCNC: 37 UNIT/L (ref 10–40)
BASOPHILS # BLD AUTO: 0.05 K/UL
BASOPHILS NFR BLD AUTO: 1 %
BILIRUB SERPL-MCNC: 0.6 MG/DL (ref 0.1–1)
BUN SERPL-MCNC: 23 MG/DL (ref 8–23)
CALCIUM SERPL-MCNC: 7 MG/DL (ref 8.7–10.5)
CHLORIDE SERPL-SCNC: 103 MMOL/L (ref 95–110)
CO2 SERPL-SCNC: 23 MMOL/L (ref 23–29)
CREAT SERPL-MCNC: 2.3 MG/DL (ref 0.5–1.4)
ERYTHROCYTE [DISTWIDTH] IN BLOOD BY AUTOMATED COUNT: 21.7 % (ref 11.5–14.5)
GFR SERPLBLD CREATININE-BSD FMLA CKD-EPI: 23 ML/MIN/1.73/M2
GLUCOSE SERPL-MCNC: 214 MG/DL (ref 70–110)
HCT VFR BLD AUTO: 24.7 % (ref 37–48.5)
HCT VFR BLD AUTO: 25.3 % (ref 37–48.5)
HGB BLD-MCNC: 8.7 GM/DL (ref 12–16)
HGB BLD-MCNC: 8.8 GM/DL (ref 12–16)
IMM GRANULOCYTES # BLD AUTO: 0.1 K/UL (ref 0–0.04)
IMM GRANULOCYTES NFR BLD AUTO: 2 % (ref 0–0.5)
LYMPHOCYTES # BLD AUTO: 0.42 K/UL (ref 1–4.8)
MAGNESIUM SERPL-MCNC: 1.9 MG/DL (ref 1.6–2.6)
MCH RBC QN AUTO: 33 PG (ref 27–31)
MCHC RBC AUTO-ENTMCNC: 34.8 G/DL (ref 32–36)
MCV RBC AUTO: 95 FL (ref 82–98)
NUCLEATED RBC (/100WBC) (SMH): 2 /100 WBC
PLATELET # BLD AUTO: 216 K/UL (ref 150–450)
PMV BLD AUTO: 9.8 FL (ref 9.2–12.9)
POCT GLUCOSE: 224 MG/DL (ref 70–110)
POCT GLUCOSE: 255 MG/DL (ref 70–110)
POCT GLUCOSE: 307 MG/DL (ref 70–110)
POTASSIUM SERPL-SCNC: 3.6 MMOL/L (ref 3.5–5.1)
PROT SERPL-MCNC: 4.6 GM/DL (ref 6–8.4)
RBC # BLD AUTO: 2.67 M/UL (ref 4–5.4)
RELATIVE EOSINOPHIL (SMH): 3.5 % (ref 0–8)
RELATIVE LYMPHOCYTE (SMH): 8.5 % (ref 18–48)
RELATIVE MONOCYTE (SMH): 16.3 % (ref 4–15)
RELATIVE NEUTROPHIL (SMH): 68.7 % (ref 38–73)
SODIUM SERPL-SCNC: 135 MMOL/L (ref 136–145)
WBC # BLD AUTO: 4.92 K/UL (ref 3.9–12.7)

## 2025-06-16 PROCEDURE — 99900031 HC PATIENT EDUCATION (STAT)

## 2025-06-16 PROCEDURE — 11000001 HC ACUTE MED/SURG PRIVATE ROOM

## 2025-06-16 PROCEDURE — 94660 CPAP INITIATION&MGMT: CPT

## 2025-06-16 PROCEDURE — 25000003 PHARM REV CODE 250: Performed by: HOSPITALIST

## 2025-06-16 PROCEDURE — 94640 AIRWAY INHALATION TREATMENT: CPT

## 2025-06-16 PROCEDURE — 99900035 HC TECH TIME PER 15 MIN (STAT)

## 2025-06-16 PROCEDURE — 85025 COMPLETE CBC W/AUTO DIFF WBC: CPT | Performed by: SURGERY

## 2025-06-16 PROCEDURE — 85014 HEMATOCRIT: CPT | Performed by: HOSPITALIST

## 2025-06-16 PROCEDURE — 25000003 PHARM REV CODE 250: Performed by: SURGERY

## 2025-06-16 PROCEDURE — 85018 HEMOGLOBIN: CPT | Performed by: HOSPITALIST

## 2025-06-16 PROCEDURE — 63600175 PHARM REV CODE 636 W HCPCS: Performed by: HOSPITALIST

## 2025-06-16 PROCEDURE — 63600175 PHARM REV CODE 636 W HCPCS: Mod: JZ | Performed by: ANESTHESIOLOGY

## 2025-06-16 PROCEDURE — 83735 ASSAY OF MAGNESIUM: CPT | Performed by: SURGERY

## 2025-06-16 PROCEDURE — 80053 COMPREHEN METABOLIC PANEL: CPT | Performed by: SURGERY

## 2025-06-16 PROCEDURE — 25000003 PHARM REV CODE 250: Performed by: INTERNAL MEDICINE

## 2025-06-16 PROCEDURE — 94761 N-INVAS EAR/PLS OXIMETRY MLT: CPT

## 2025-06-16 PROCEDURE — 97161 PT EVAL LOW COMPLEX 20 MIN: CPT

## 2025-06-16 PROCEDURE — 97535 SELF CARE MNGMENT TRAINING: CPT

## 2025-06-16 PROCEDURE — 97165 OT EVAL LOW COMPLEX 30 MIN: CPT

## 2025-06-16 PROCEDURE — 25000242 PHARM REV CODE 250 ALT 637 W/ HCPCS: Performed by: SURGERY

## 2025-06-16 RX ORDER — GLUCAGON 1 MG
1 KIT INJECTION
Status: DISCONTINUED | OUTPATIENT
Start: 2025-06-16 | End: 2025-06-17 | Stop reason: HOSPADM

## 2025-06-16 RX ORDER — IBUPROFEN 200 MG
24 TABLET ORAL
Status: DISCONTINUED | OUTPATIENT
Start: 2025-06-16 | End: 2025-06-17 | Stop reason: HOSPADM

## 2025-06-16 RX ORDER — INSULIN GLARGINE 100 [IU]/ML
10 INJECTION, SOLUTION SUBCUTANEOUS 2 TIMES DAILY
Status: DISCONTINUED | OUTPATIENT
Start: 2025-06-16 | End: 2025-06-17 | Stop reason: HOSPADM

## 2025-06-16 RX ORDER — INSULIN ASPART 100 [IU]/ML
0-10 INJECTION, SOLUTION INTRAVENOUS; SUBCUTANEOUS
Status: DISCONTINUED | OUTPATIENT
Start: 2025-06-16 | End: 2025-06-17 | Stop reason: HOSPADM

## 2025-06-16 RX ORDER — IBUPROFEN 200 MG
16 TABLET ORAL
Status: DISCONTINUED | OUTPATIENT
Start: 2025-06-16 | End: 2025-06-17 | Stop reason: HOSPADM

## 2025-06-16 RX ADMIN — IPRATROPIUM BROMIDE 0.5 MG: 0.5 SOLUTION RESPIRATORY (INHALATION) at 07:06

## 2025-06-16 RX ADMIN — APIXABAN 5 MG: 5 TABLET, FILM COATED ORAL at 08:06

## 2025-06-16 RX ADMIN — ARFORMOTEROL TARTRATE 15 MCG: 15 SOLUTION RESPIRATORY (INHALATION) at 07:06

## 2025-06-16 RX ADMIN — SUCRALFATE ORAL 1 G: 1 SUSPENSION ORAL at 06:06

## 2025-06-16 RX ADMIN — INSULIN GLARGINE 10 UNITS: 100 INJECTION, SOLUTION SUBCUTANEOUS at 09:06

## 2025-06-16 RX ADMIN — INSULIN ASPART 4 UNITS: 100 INJECTION, SOLUTION INTRAVENOUS; SUBCUTANEOUS at 01:06

## 2025-06-16 RX ADMIN — INSULIN ASPART 6 UNITS: 100 INJECTION, SOLUTION INTRAVENOUS; SUBCUTANEOUS at 05:06

## 2025-06-16 RX ADMIN — CLOPIDOGREL 75 MG: 75 TABLET ORAL at 09:06

## 2025-06-16 RX ADMIN — IPRATROPIUM BROMIDE 0.5 MG: 0.5 SOLUTION RESPIRATORY (INHALATION) at 02:06

## 2025-06-16 RX ADMIN — HYDROMORPHONE HYDROCHLORIDE 0.5 MG: 1 INJECTION, SOLUTION INTRAMUSCULAR; INTRAVENOUS; SUBCUTANEOUS at 08:06

## 2025-06-16 RX ADMIN — SUCRALFATE ORAL 1 G: 1 SUSPENSION ORAL at 05:06

## 2025-06-16 RX ADMIN — LETROZOLE 2.5 MG: 2.5 TABLET ORAL at 09:06

## 2025-06-16 RX ADMIN — MUPIROCIN 1 G: 20 OINTMENT TOPICAL at 08:06

## 2025-06-16 RX ADMIN — APIXABAN 5 MG: 5 TABLET, FILM COATED ORAL at 09:06

## 2025-06-16 RX ADMIN — INSULIN ASPART 2 UNITS: 100 INJECTION, SOLUTION INTRAVENOUS; SUBCUTANEOUS at 08:06

## 2025-06-16 RX ADMIN — OXYCODONE HYDROCHLORIDE AND ACETAMINOPHEN 1 TABLET: 10; 325 TABLET ORAL at 07:06

## 2025-06-16 RX ADMIN — LEVOTHYROXINE SODIUM 112 MCG: 112 TABLET ORAL at 05:06

## 2025-06-16 RX ADMIN — SUCRALFATE ORAL 1 G: 1 SUSPENSION ORAL at 11:06

## 2025-06-16 RX ADMIN — PANTOPRAZOLE SODIUM 40 MG: 40 TABLET, DELAYED RELEASE ORAL at 05:06

## 2025-06-16 RX ADMIN — IPRATROPIUM BROMIDE 0.5 MG: 0.5 SOLUTION RESPIRATORY (INHALATION) at 12:06

## 2025-06-16 RX ADMIN — OXYCODONE HYDROCHLORIDE AND ACETAMINOPHEN 1 TABLET: 10; 325 TABLET ORAL at 11:06

## 2025-06-16 RX ADMIN — LIOTHYRONINE SODIUM 10 MCG: 5 TABLET ORAL at 08:06

## 2025-06-16 RX ADMIN — MUPIROCIN 1 G: 20 OINTMENT TOPICAL at 09:06

## 2025-06-16 RX ADMIN — METOPROLOL SUCCINATE 25 MG: 25 TABLET, EXTENDED RELEASE ORAL at 01:06

## 2025-06-16 NOTE — PLAN OF CARE
FERNANDO met with pt at bedside to discuss discharge plan - HH.  List delivered to bedside  Pt wants to review the list with her spouse.  Pt to discharge to home today.    SW to complete HH tomorrow to give pt time to review list.    Update: pt to discharge tomorrow. FERNANDO to follow up on choice before discharge.   06/16/25 0345   Post-Acute Status   Post-Acute Authorization Home Health   Patient choice form signed by patient/caregiver List with quality metrics by geographic area provided   Discharge Delays None known at this time   Discharge Plan   Discharge Plan A Home Health   Discharge Plan B Home Health

## 2025-06-16 NOTE — PT/OT/SLP EVAL
"Physical Therapy Evaluation    Patient Name:  Yasmin Verde   MRN:  [id number]    Recommendations:     Discharge Recommendations: Low Intensity Therapy   Discharge Equipment Recommendations: none   Barriers to discharge: None    Assessment:     Yasmin Verde is a 69 y.o. female admitted with a medical diagnosis of <principal problem not specified>.  She presents with the following impairments/functional limitations: weakness, impaired endurance, gait instability, impaired functional mobility, pain.    Patient agreeable to therapy evaluation. Patient reports she has used a RW for 2 weeks prior to admit. Up in chair with family/nursing present upon entering room. Patient able to perform transfers and gait with CGA. Ambulated 30' with RW. Slow antalgic gait pattern noted. Patient returned to bed secondary to fatigue. Sit>sup: Mod A. Patient left with nursing/family present.     Rehab Prognosis: Good; patient would benefit from acute skilled PT services to address these deficits and reach maximum level of function.    Recent Surgery: Procedure(s) (LRB):  EVACUATION, HEMATOMA, INGUINAL REGION (Left) 2 Days Post-Op    Plan:     During this hospitalization, patient to be seen 5 x/week to address the identified rehab impairments via gait training, therapeutic activities, therapeutic exercises and progress toward the following goals:    Plan of Care Expires:  07/16/25    Subjective     Chief Complaint: Patient reports she feels weak because she hasn't been able to eat.   Patient/Family Comments/goals: "I need to lie down."  Pain/Comfort:  Pain Rating 1: 5/10  Location - Side 1: Bilateral  Location 1: leg (shoulders, neck)    Patients cultural, spiritual, Orthodoxy conflicts given the current situation: no    Living Environment:  Home with  in [facility]. No SOFYA.   Prior to admission, patients level of function was Mod.I. with RW. Using RW for 2 weeks secondary to BLE pain.  Equipment used at home: walker, rolling, " rollator, other (see comments) (Tripod walker).  DME owned (not currently used): none.  Upon discharge, patient will have assistance from family.    Objective:     Communicated with nursing prior to session.  Patient found up in chair with blood pressure cuff, VINNY drain, peripheral IV, pulse ox (continuous), telemetry, Other (comments) (family present)  upon PT entry to room.    General Precautions: Standard, fall  Orthopedic Precautions:N/A   Braces: N/A  Respiratory Status: Room air    Exams:  RLE ROM: WFL  RLE Strength: Hip/knee: 4/5 (pain); ankle: 5/5  LLE ROM: WFL  LLE Strength: Hip/knee: Grossly at least 3/5 (no formal MMT secondary to pain and s/p surgery); ankle: 5/5    Functional Mobility:  Bed Mobility:     Bridging: stand by assistance  Sit to Supine: moderate assistance  Transfers:     Sit to Stand:  contact guard assistance with rolling walker  Gait: CGA with RW x 30'  Balance: sitting: Independent, standing: CGA      AM-PAC 6 CLICK MOBILITY  Total Score:17       Treatment & Education:  Pt educated on POC, discharge recommendation, need for assist with mobility, use of call bell to seek assistance as needed and fall prevention     Patient left supine with all lines intact, call button in reach, bed alarm on, and nursing/family present.    GOALS:   Multidisciplinary Problems       Physical Therapy Goals          Problem: Physical Therapy    Goal Priority Disciplines Outcome Interventions   Physical Therapy Goal     PT, PT/OT     Description: Goals to be met by: 25     Patient will increase functional independence with mobility by performin. Supine to sit with Modified [place]  2. Sit to supine with Modified [place]  3. Sit to stand transfer with Modified [place]  4. Gait  x 150 feet with Modified [place] using Rolling Walker.                          DME Justifications:  No DME recommended requiring DME justifications    History:     Past Medical History:   Diagnosis Date     Cancer     right breast with mets to left hip     Chronic kidney disease, unspecified     Chronic kidney disease, unspecified     Coronary artery disease     Diabetes mellitus     Dizziness     Headache(784.0)     Hypertension     Hypothyroidism     Obesity     Otitis media     Renal disorder     CKD    Sleep apnea        Past Surgical History:   Procedure Laterality Date    ANGIOGRAM, CORONARY, WITH LEFT HEART CATHETERIZATION  5/27/2021    Procedure: Angiogram, Coronary, with Left Heart Cath;  Surgeon: Luis Henderson MD;  Location: [facility] CATH;  Service: Cardiovascular;;    ANGIOGRAM, EXTREMITY, UNILATERAL  6/9/2025    Procedure: Rt. Leg angiogram;  Surgeon: Luis Pichardo MD;  Location: [facility] CATH;  Service: Cardiovascular;;    AORTOGRAPHY WITH SERIALOGRAPHY  6/9/2025    Procedure: Abdominal angiogram;  Surgeon: Luis Pichardo MD;  Location: [facility] CATH;  Service: Cardiovascular;;    APPENDECTOMY      CARDIAC CATHETERIZATION      1 stent   in LAD    CHOLECYSTECTOMY      COLONOSCOPY      ESOPHAGOGASTRODUODENOSCOPY N/A 7/11/2024    Procedure: EGD (ESOPHAGOGASTRODUODENOSCOPY);  Surgeon: Frantz Loaiza MD;  Location: [facility] ENDO;  Service: Endoscopy;  Laterality: N/A;    EVACUATION, HEMATOMA, INGUINAL REGION Left 6/14/2025    Procedure: EVACUATION, HEMATOMA, INGUINAL REGION;  Surgeon: Khoobehi, Ali, MD;  Location: [facility] OR;  Service: Vascular;  Laterality: Left;    INSTANTANEOUS WAVE-FREE RATIO (IFR)  8/1/2023    Procedure: (IFR) RCA;  Surgeon: Luis Pichardo MD;  [facility];  Service: Cardiovascular;;    LEFT HEART CATHETERIZATION Left 7/2/2019    Procedure: Left heart cath;  Surgeon: Mirela Ivan MD;  Location: [facility] CATH;  Service: Cardiology;  Laterality: Left;    LEFT HEART CATHETERIZATION Left 5/27/2021    Procedure: Left heart cath;  Surgeon: Luis Henderson MD;  Location: [facility] CATH;  Service: Cardiovascular;  Laterality: Left;    LEFT HEART CATHETERIZATION Left  9/20/2022    Procedure: Left heart cath;  Surgeon: Luis Henderson MD;  Location: [facility] CATH;  Service: Cardiovascular;  Laterality: Left;    LEFT HEART CATHETERIZATION  8/1/2023    Procedure: Left heart cath;  Surgeon: Luis Pichardo MD;  Location: [facility] CATH;  Service: Cardiovascular;;    LEFT HEART CATHETERIZATION  4/15/2025    Procedure: Left heart cath;  Surgeon: Luis Pichardo MD;  Location: [facility] CATH;  Service: Cardiovascular;;    MASTECTOMY Bilateral     with bilateral reconstruction CHERY flaps    PERCUTANEOUS CORONARY INTERVENTION, ARTERY  4/15/2025    Procedure: PTCA OM;  Surgeon: Luis Pichardo MD;  Location: [facility] CATH;  Service: Cardiovascular;;    PTA, ARTERY, PERIPHERAL  6/9/2025    Procedure: PTA (Shockwave) / Atherectomy (CSI) Rt. SFA;  Surgeon: Luis Pichardo MD;  Location: [facility] CATH;  Service: Cardiovascular;;    RIGHT HEART CATHETERIZATION  8/1/2023    Procedure: Right heart cath;  Surgeon: Luis Pichardo MD;  [facility];  Service: Cardiovascular;;       Time Tracking:     PT Received On: 06/16/25  PT Start Time: 1037     PT Stop Time: 1052  PT Total Time (min): 15 min     Billable Minutes: Evaluation 15      06/16/2025

## 2025-06-16 NOTE — HOSPITAL COURSE
Patient is s/p  left femoral artery pseudoaneurysm repair per Vascular surgery 6/14/25.  Intraoperatively, she had received 2 units PRBCs.  Patient has had a stable/uncomplicated postoperative course.  She remains hemodynamically stable and now complains of 0/10 pain.  Her VINNY drain remains in place. She has chronic dysphagia condition and was scheduled to have esophageal dilation 6/16 which will need to be rescheduled. She is on liquid-soft diet with still concerns regarding swallowing, though better.  PLan for dc home tomorrow with  services. Follow up vascular surgery for Vinny drain removal.     Your medications have changed   START taking:  clopidogreL (PLAVIX)   ELIQUIS (apixaban)    CHANGE how you take:  oxyCODONE-acetaminophen (PERCOCET)    PAUSE taking:  olmesartan 5 MG Tab (BENICAR)    STOP taking:  aspirin 81 MG EC tablet (ECOTRIN)   enoxaparin 100 mg/mL Syrg (LOVENOX)

## 2025-06-16 NOTE — PLAN OF CARE
ICU Care Plan  [facility]    POC reviewed with Yasmin Verde. Pt verbalized understanding. Questions and concerns addressed. No acute events overnight. Pt progressing toward goals. See below and flowsheets for full assessment and VS info.   Temp:  [98.2 °F (36.8 °C)]   Pulse:  [82-95]   Resp:  [13-29]   BP: ()/(49-68)   SpO2:  [88 %-100 %]     Neuro:  [place] Coma Scale  Best Eye Response: 4-->(E4) spontaneous  Best Motor Response: 6-->(M6) obeys commands  Best Verbal Response: 5-->(V5) oriented  [place] Coma Scale Score: 15   Pupil PERRLA: yes  24hr Temp:  [98.2 °F (36.8 °C)-98.3 °F (36.8 °C)]     CV:   Rhythm: normal sinus rhythm     Resp:   Device (Oxygen Therapy): room air    GI/:  Diet/Nutrition Received: consistent carb/diabetic diet  Last Bowel Movement: 06/12/25  Voiding Characteristics: voids spontaneously without difficulty  I/O this shift:  In: 120 [P.O.:120]  Out: 300 [Urine:200; Drains:100]    Intake/Output Summary (Last 24 hours) at 6/16/2025 0637  Last data filed at 6/16/2025 0600  Gross per 24 hour   Intake 2021.86 ml   Output 810 ml   Net 1211.86 ml       Lines/Drains/Airways       Central Venous Catheter Line  Duration             Percutaneous Central Line - Triple Lumen  06/15/25 0043 Internal Jugular Right 1 day              Drain  Duration                  Closed/Suction Drain 06/15/25 0148 Tube - 1 Left Groin Bulb 15 Fr. 1 day              Peripheral Intravenous Line  Duration                  Peripheral IV - Single Lumen 06/14/25 1300 20 G Anterior;Left;Proximal Forearm 1 day

## 2025-06-16 NOTE — CARE UPDATE
06/15/25 2001   Patient Assessment/Suction   Level of Consciousness (AVPU) alert   Respiratory Effort Unlabored   Expansion/Accessory Muscles/Retractions expansion symmetric   All Lung Fields Breath Sounds clear   Rhythm/Pattern, Respiratory depth regular;pattern regular   Cough Frequency infrequent   Cough Type good;nonproductive   PRE-TX-O2   Device (Oxygen Therapy) room air   $ Is the patient on Low Flow Oxygen? Yes   SpO2 100 %   Pulse Oximetry Type Continuous   $ Pulse Oximetry - Multiple Charge Pulse Oximetry - Multiple   Pulse 87   Resp 18   Aerosol Therapy   $ Aerosol Therapy Charges Aerosol Treatment   Daily Review of Necessity (SVN) completed   Respiratory Treatment Status (SVN) given   Treatment Route (SVN) mask   Patient Position HOB elevated   Post Treatment Assessment (SVN) breath sounds unchanged   Signs of Intolerance (SVN) none   Breath Sounds Post-Respiratory Treatment   Throughout All Fields Post-Treatment All Fields   Throughout All Fields Post-Treatment no change   Post-treatment Heart Rate (beats/min) 82   Post-treatment Resp Rate (breaths/min) 16   Peak Flow   PEFR PREtreatment (L/Min) 0   Education   $ Education Oxygen;15 min

## 2025-06-16 NOTE — SUBJECTIVE & OBJECTIVE
Interval History: patient seen and examined, bruising to left groin and left UE    Review of Systems   HENT:  Positive for trouble swallowing.    Respiratory: Negative.     Cardiovascular: Negative.    Gastrointestinal: Negative.    Genitourinary: Negative.      Objective:     Vital Signs (Most Recent):  Temp: 98.3 °F (36.8 °C) (06/16/25 0730)  Pulse: 95 (06/16/25 1730)  Resp: 18 (06/16/25 1730)  BP: 124/82 (06/16/25 1730)  SpO2: 96 % (06/16/25 1400) Vital Signs (24h Range):  Temp:  [98.2 °F (36.8 °C)-98.3 °F (36.8 °C)] 98.3 °F (36.8 °C)  Pulse:  [81-95] 95  Resp:  [13-29] 18  SpO2:  [88 %-100 %] 96 %  BP: ()/(49-82) 124/82     Weight: 96.2 kg (212 lb 1.3 oz)  Body mass index is 36.4 kg/m².    Intake/Output Summary (Last 24 hours) at 6/16/2025 1836  Last data filed at 6/16/2025 1800  Gross per 24 hour   Intake 1080 ml   Output 1235 ml   Net -155 ml         Physical Exam  Constitutional:       General: She is not in acute distress.  HENT:      Mouth/Throat:      Mouth: Mucous membranes are moist.      Pharynx: Oropharynx is clear.   Cardiovascular:      Rate and Rhythm: Normal rate. Rhythm irregular.   Pulmonary:      Effort: Pulmonary effort is normal. No respiratory distress.      Breath sounds: No wheezing.   Abdominal:      General: Bowel sounds are normal. There is no distension.   Skin:     General: Skin is warm and dry.      Capillary Refill: Capillary refill takes less than 2 seconds.      Findings: Bruising present.   Neurological:      General: No focal deficit present.      Mental Status: She is alert and oriented to person, place, and time. Mental status is at baseline.               Significant Labs: All pertinent labs within the past 24 hours have been reviewed.  Recent Lab Results  (Last 5 results in the past 24 hours)        06/16/25  1709   06/16/25  1708   06/16/25  1706   06/16/25  1316   06/16/25  0550        Albumin         2.5       ALP         111       ALT         26       Anion Gap          9       AST         37       Baso #         0.05       Basophil %         1.0       BILIRUBIN TOTAL         0.6  Comment: For infants and newborns, interpretation of results should be based   on gestational age, weight and in agreement with clinical   observations.    Premature Infant recommended reference ranges:   0-24 hours:  <8.0 mg/dL   24-48 hours: <12.0 mg/dL   3-5 days:    <15.0 mg/dL   6-29 days:   <15.0 mg/dL       BUN         23       Calcium         7.0       Chloride         103       CO2         23       Creatinine         2.3       eGFR         23       Eos #         0.17       Eos %         3.5       Glucose         214       Hematocrit 24.7         25.3       Hemoglobin   8.7       8.8       Immature Grans (Abs)         0.10  Comment: Mild elevation in immature granulocytes is non specific and can be seen in a variety of conditions including stress response, acute inflammation, trauma and pregnancy. Correlation with other laboratory and clinical findings is essential.       Immature Granulocytes         2.0       Lymph #         0.42       LYMPH %         8.5       Magnesium          1.9       MCH         33.0       MCHC         34.8       MCV         95       Mono #         0.80       Mono %         16.3       MPV         9.8       Neut #         3.4       Neut %         68.7       nRBC         2       Platelet Count         216       POCT Glucose     255   307         Potassium         3.6       PROTEIN TOTAL         4.6       RBC         2.67       RDW         21.7       Sodium         135       WBC         4.92                              Significant Imaging: I have reviewed all pertinent imaging results/findings within the past 24 hours.

## 2025-06-16 NOTE — PLAN OF CARE
06/16/25 0757   Patient Assessment/Suction   Level of Consciousness (AVPU) alert   Respiratory Effort Normal;Unlabored   Expansion/Accessory Muscles/Retractions no retractions;no use of accessory muscles   All Lung Fields Breath Sounds clear;diminished   Rhythm/Pattern, Respiratory unlabored;pattern regular;depth regular   PRE-TX-O2   Device (Oxygen Therapy) room air   SpO2 97 %   Pulse Oximetry Type Continuous   $ Pulse Oximetry - Multiple Charge Pulse Oximetry - Multiple   Pulse 95   Resp 20   Aerosol Therapy   $ Aerosol Therapy Charges Aerosol Treatment   Daily Review of Necessity (SVN) completed   Respiratory Treatment Status (SVN) given   Treatment Route (SVN) mask;oxygen   Patient Position HOB elevated   Post Treatment Assessment (SVN) breath sounds unchanged   Signs of Intolerance (SVN) none   Breath Sounds Post-Respiratory Treatment   Post-treatment Heart Rate (beats/min) 91   Post-treatment Resp Rate (breaths/min) 12   Education   $ Education Oxygen;15 min

## 2025-06-16 NOTE — ASSESSMENT & PLAN NOTE
Anemia is likely due to acute blood loss which was from hematoma . Most recent hemoglobin and hematocrit are listed below.  Recent Labs     06/15/25  2103 06/16/25  0550 06/16/25  1708 06/16/25  1709   HGB 9.3* 8.8* 8.7*  --    HCT 26.6* 25.3*  --  24.7*     Plan  - Monitor serial CBC: Daily  - Transfuse PRBC if patient becomes hemodynamically unstable, symptomatic or H/H drops below 7/21.  - Patient has received 2 units of PRBCs on 6/14  - Patient's anemia is currently stable

## 2025-06-16 NOTE — ASSESSMENT & PLAN NOTE
Patient's blood pressure range in the last 24 hours was: BP  Min: 94/50  Max: 171/68.The patient's inpatient anti-hypertensive regimen is listed below:  Current Antihypertensives  metoprolol succinate (TOPROL-XL) 24 hr tablet 25 mg, After lunch, Oral    Plan  - BP is controlled, no changes needed to their regimen

## 2025-06-16 NOTE — PROGRESS NOTES
"Former[facility] Medicine  Progress Note    Patient Name: Yasmin Verde  MRN: [id number]  Patient Class: IP- Inpatient   Admission Date: 6/14/2025  Length of Stay: 2 days  Attending Physician: Vivienne Garcia MD  Primary Care Provider: Freida Warren MD        Subjective     Principal Problem:<principal problem not specified>        HPI:  69-year-old  female with a history of breast cancer, status post chemotherapy/radiation (currently on Imbrance/Femara), hypertension, obstructive sleep apnea (does not use device), hypothyroidism, diabetes, and peripheral arterial disease  The patient has chronic "neuropathy" in her feet and she had progressively worsening symptoms with right foot erythema and swollen told-> at some point, she had purplish discoloration  She was recently admitted at [facility] (6/6-6/12) and during that time underwent abdominal angiogram with shockwave/atherectomy of the right SFA and right leg angiogram  The patient had improvement in her PID symptoms but on the night prior to presentation, she had developed lower abdominal pain and then this morning had a "knot" in her left groin (where they angiogram occurred) that has gotten progressively larger  At [facility], she had a low-grade temperature of 99.5° F and heart rate was in the 110s (later 80s); she was hemodynamically stable with 100% sats  Hemoglobin went from 10.1-> 7.7 in a matter of 4 hours; creatinine was 1.83 (baseline) and CO2 is 20 with an anion gap of 17 (glucose was 177)  Left lower extremity arterial ultrasound showed, per radiologist Dr. Mendoza:    "Large multilobulated left common femoral artery pseudoaneurysm with 5 mm neck."    Patient received, in total, 3.5 mg of Dilaudid, a L of fluid, and Zofran was transferred here for further diagnosis, treatment, and care  While here, she is afebrile and hemodynamically stable although at 1 point she did drop her " pressure 92/42 (MAP 59)-> protamine is forthcoming as per Vascular surgery who is aware of her case and facilitated transferring her here to us  Repeat CBC/CMP are pending as well as coags and type and screen    Overview/Hospital Course:  Patient is s/p  left femoral artery pseudoaneurysm repair per Vascular surgery 6/14/25.  Intraoperatively, she had received 2 units PRBCs.  Patient has had a stable/uncomplicated postoperative course.  She remains hemodynamically stable and now complains of 0/10 pain.  Her VINNY drain remains in place. She has chronic dysphagia condition and was scheduled to have esophageal dilation 6/16 which will need to be rescheduled. She is on liquid-soft diet with still concerns regarding swallowing, though better.  PLan for dc home tomorrow with HH services. Follow up vascular surgery for Vinny drain removal.     Interval History: patient seen and examined, bruising to left groin and left UE    Review of Systems   HENT:  Positive for trouble swallowing.    Respiratory: Negative.     Cardiovascular: Negative.    Gastrointestinal: Negative.    Genitourinary: Negative.      Objective:     Vital Signs (Most Recent):  Temp: 98.3 °F (36.8 °C) (06/16/25 0730)  Pulse: 95 (06/16/25 1730)  Resp: 18 (06/16/25 1730)  BP: 124/82 (06/16/25 1730)  SpO2: 96 % (06/16/25 1400) Vital Signs (24h Range):  Temp:  [98.2 °F (36.8 °C)-98.3 °F (36.8 °C)] 98.3 °F (36.8 °C)  Pulse:  [81-95] 95  Resp:  [13-29] 18  SpO2:  [88 %-100 %] 96 %  BP: ()/(49-82) 124/82     Weight: 96.2 kg (212 lb 1.3 oz)  Body mass index is 36.4 kg/m².    Intake/Output Summary (Last 24 hours) at 6/16/2025 1836  Last data filed at 6/16/2025 1800  Gross per 24 hour   Intake 1080 ml   Output 1235 ml   Net -155 ml         Physical Exam  Constitutional:       General: She is not in acute distress.  HENT:      Mouth/Throat:      Mouth: Mucous membranes are moist.      Pharynx: Oropharynx is clear.   Cardiovascular:      Rate and Rhythm: Normal rate.  Rhythm irregular.   Pulmonary:      Effort: Pulmonary effort is normal. No respiratory distress.      Breath sounds: No wheezing.   Abdominal:      General: Bowel sounds are normal. There is no distension.   Skin:     General: Skin is warm and dry.      Capillary Refill: Capillary refill takes less than 2 seconds.      Findings: Bruising present.   Neurological:      General: No focal deficit present.      Mental Status: She is alert and oriented to person, place, and time. Mental status is at baseline.               Significant Labs: All pertinent labs within the past 24 hours have been reviewed.  Recent Lab Results  (Last 5 results in the past 24 hours)        06/16/25  1709   06/16/25  1708   06/16/25  1706   06/16/25  1316   06/16/25  0550        Albumin         2.5       ALP         111       ALT         26       Anion Gap         9       AST         37       Baso #         0.05       Basophil %         1.0       BILIRUBIN TOTAL         0.6  Comment: For infants and newborns, interpretation of results should be based   on gestational age, weight and in agreement with clinical   observations.    Premature Infant recommended reference ranges:   0-24 hours:  <8.0 mg/dL   24-48 hours: <12.0 mg/dL   3-5 days:    <15.0 mg/dL   6-29 days:   <15.0 mg/dL       BUN         23       Calcium         7.0       Chloride         103       CO2         23       Creatinine         2.3       eGFR         23       Eos #         0.17       Eos %         3.5       Glucose         214       Hematocrit 24.7         25.3       Hemoglobin   8.7       8.8       Immature Grans (Abs)         0.10  Comment: Mild elevation in immature granulocytes is non specific and can be seen in a variety of conditions including stress response, acute inflammation, trauma and pregnancy. Correlation with other laboratory and clinical findings is essential.       Immature Granulocytes         2.0       Lymph #         0.42       LYMPH %         8.5        Magnesium          1.9       MCH         33.0       MCHC         34.8       MCV         95       Mono #         0.80       Mono %         16.3       MPV         9.8       Neut #         3.4       Neut %         68.7       nRBC         2       Platelet Count         216       POCT Glucose     255   307         Potassium         3.6       PROTEIN TOTAL         4.6       RBC         2.67       RDW         21.7       Sodium         135       WBC         4.92                              Significant Imaging: I have reviewed all pertinent imaging results/findings within the past 24 hours.      Assessment & Plan  Essential hypertension  Patient's blood pressure range in the last 24 hours was: BP  Min: 94/50  Max: 171/68.The patient's inpatient anti-hypertensive regimen is listed below:  Current Antihypertensives  metoprolol succinate (TOPROL-XL) 24 hr tablet 25 mg, After lunch, Oral    Plan  - BP is controlled, no changes needed to their regimen    Type 2 diabetes mellitus without complication, without long-term current use of insulin    Sliding scale insulin per protocol  Add lantus BID as taken at home   Hemoglobin A1c 7.9 this month  History of breast cancer    Hold home Ibrance/Femara for now until acute issues stabilize  Acquired hypothyroidism    Home Synthroid/Cytomel resumed  TSH nonsuppressed at 9.320 in February-> will recheck TSH and add free T4  CKD (chronic kidney disease) stage 3, GFR 30-59 ml/min  At baseline  Minimize nephrotoxic medications  Follow daily weights, strict Is&Os, and clinical exam  Daily BMP  Pseudoaneurysm of left femoral artery  In the context of recent angiography  Rapidly progressing left inguinal swelling throughout the day, now was precipitous drop in hemoglobin; for now:  Protamine as ordered by vascular surgery  Check stat CBC/CMP/coags/type and screen-> transfuse for hemoglobin less than 7.0 and/or development of hypotension  Trend H/H daily   Plavix and eliquis initiated   Vascular  surgery consult  S/p evacuation of hematoma 6/14  Acute blood loss anemia  Anemia is likely due to acute blood loss which was from hematoma . Most recent hemoglobin and hematocrit are listed below.  Recent Labs     06/15/25  2103 06/16/25  0550 06/16/25  1708 06/16/25  1709   HGB 9.3* 8.8* 8.7*  --    HCT 26.6* 25.3*  --  24.7*     Plan  - Monitor serial CBC: Daily  - Transfuse PRBC if patient becomes hemodynamically unstable, symptomatic or H/H drops below 7/21.  - Patient has received 2 units of PRBCs on 6/14  - Patient's anemia is currently stable    VTE Risk Mitigation (From admission, onward)           Ordered     apixaban tablet 5 mg  2 times daily         06/15/25 1451     IP VTE HIGH RISK PATIENT  Once         06/14/25 2056     Place sequential compression device  Until discontinued         06/14/25 2056                    Discharge Planning   ЮЛИЯ: 6/17/2025     Code Status: Full Code   Medical Readiness for Discharge Date:   Discharge Plan A: Home Health   Discharge Delays: None known at this time        Critical care time spent on the evaluation and treatment of severe organ dysfunction, review of pertinent labs and imaging studies, discussions with consulting providers and discussions with patient/family: 15 minutes.    Please place Justification for DME        Vivienne Garcia MD  Department of Hospital Medicine   [facility]

## 2025-06-16 NOTE — PT/OT/SLP EVAL
Occupational Therapy   Evaluation    Name: Yasmin Verde  MRN: [id number]  Admitting Diagnosis: <principal problem not specified>  Recent Surgery: Procedure(s) (LRB):  EVACUATION, HEMATOMA, INGUINAL REGION (Left) 2 Days Post-Op    Recommendations:     Discharge Recommendations: Low Intensity Therapy  Discharge Equipment Recommendations:  none  Barriers to discharge:  None    Assessment:     Yasmin Verde is a 69 y.o. female with a medical diagnosis of <principal problem not specified>.  Pt agreeable to limited OT evaluation this AM. Performance deficits affecting function: impaired endurance, weakness, impaired self care skills, impaired functional mobility, gait instability, impaired balance, pain, impaired cardiopulmonary response to activity.  Pt declined EOB/OOB this date d/t fatigue from PT and sitting up in chair earlier.    Rehab Prognosis: Good; patient would benefit from acute skilled OT services to address these deficits and reach maximum level of function.       Plan:     Patient to be seen 3 x/week to address the above listed problems via self-care/home management, therapeutic activities, therapeutic exercises  Plan of Care Expires: 07/16/25  Plan of Care Reviewed with: patient, spouse    Subjective     Chief Complaint: fatigue  Patient/Family Comments/goals: none stated    Occupational Profile:  Living Environment: Pt lives with  in a 1 story home with no SOFYA. Pt has a WIS with a shower chair  Previous level of function: Independent with ADLs and mobility. Pt's  completes IADLs  Roles and Routines: wife  Equipment Used at Home: rollator, walker, rolling, shower chair  Assistance upon Discharge: yes, from     Pain/Comfort:  Pain Rating 1: 5/10  Location - Side 1: Left  Location 1: groin  Pain Addressed 1: Reposition, Distraction    Patients cultural, spiritual, Pentec[employer]al conflicts given the current situation:      Objective:     Communicated with: nursing prior to session.   Patient found HOB elevated with blood pressure cuff, VINNY drain, peripheral IV, pulse ox (continuous), telemetry, bed alarm upon OT entry to room.    General Precautions: Standard, fall  Orthopedic Precautions: N/A  Braces: N/A  Respiratory Status: Room air    Occupational Performance:    Activities of Daily Living:  Feeding:  independence per pt  Grooming: setup assistance bed level for oral care and to wash face      Cognitive/Visual Perceptual:  Cognitive/Psychosocial Skills:     -       Oriented to: Person, Place, Time, and Situation   -       Follows Commands/attention:Follows two-step commands  -       Communication: clear/fluent  -       Memory: No Deficits noted  -       Safety awareness/insight to disability: intact   -       Mood/Affect/Coping skills/emotional control: Appropriate to situation, Cooperative, and Pleasant    Physical Exam:  Upper Extremity Range of Motion:     -       Right Upper Extremity: WFL  -       Left Upper Extremity: WFL  Upper Extremity Strength:    -       Right Upper Extremity: WFL  -       Left Upper Extremity: WFL   Strength:    -       Right Upper Extremity: WFL  -       Left Upper Extremity: WFL  Fine Motor Coordination:    -       Intact  Gross motor coordination:   WFL    AMPAC 6 Click ADL:  AMPAC Total Score: 18    Treatment & Education:  Pt educated on role of OT/POC, importance of OOB/EOB activity, use of call bell, and safety during ADLs, transfers, and functional mobility.    Patient left HOB elevated with all lines intact, call button in reach, bed alarm on, and  present    GOALS:   Multidisciplinary Problems       Occupational Therapy Goals          Problem: Occupational Therapy    Goal Priority Disciplines Outcome Interventions   Occupational Therapy Goal     OT, PT/OT     Description: Goals to be met by: 7/16/25     Patient will increase functional independence with ADLs by performing:    UE Dressing with Supervision.  LE Dressing with  Supervision.  Grooming while standing at sink with Supervision.  Toileting from toilet with Supervision for hygiene and clothing management.   Toilet transfer to toilet with Supervision.                         History:     Past Medical History:   Diagnosis Date    Cancer     right breast with mets to left hip     Chronic kidney disease, unspecified     Chronic kidney disease, unspecified     Coronary artery disease     Diabetes mellitus     Dizziness     Headache(784.0)     Hypertension     Hypothyroidism     Obesity     Otitis media     Renal disorder     CKD    Sleep apnea          Past Surgical History:   Procedure Laterality Date    ANGIOGRAM, CORONARY, WITH LEFT HEART CATHETERIZATION  5/27/2021    Procedure: Angiogram, Coronary, with Left Heart Cath;  Surgeon: Luis Henderson MD;  Location: [facility] CATH;  Service: Cardiovascular;;    ANGIOGRAM, EXTREMITY, UNILATERAL  6/9/2025    Procedure: Rt. Leg angiogram;  Surgeon: Luis Pichardo MD;  Location: [facility] CATH;  Service: Cardiovascular;;    AORTOGRAPHY WITH SERIALOGRAPHY  6/9/2025    Procedure: Abdominal angiogram;  Surgeon: Luis Pichardo MD;  Location: [facility] CATH;  Service: Cardiovascular;;    APPENDECTOMY      CARDIAC CATHETERIZATION      1 stent   in LAD    CHOLECYSTECTOMY      COLONOSCOPY      ESOPHAGOGASTRODUODENOSCOPY N/A 7/11/2024    Procedure: EGD (ESOPHAGOGASTRODUODENOSCOPY);  Surgeon: Frantz Loaiza MD;  Location: [facility] ENDO;  Service: Endoscopy;  Laterality: N/A;    EVACUATION, HEMATOMA, INGUINAL REGION Left 6/14/2025    Procedure: EVACUATION, HEMATOMA, INGUINAL REGION;  Surgeon: Khoobehi, Ali, MD;  Location: [facility] OR;  Service: Vascular;  Laterality: Left;    INSTANTANEOUS WAVE-FREE RATIO (IFR)  8/1/2023    Procedure: (IFR) RCA;  Surgeon: Luis Pichardo MD;  Location: [facility] CATH;  Service: Cardiovascular;;    LEFT HEART CATHETERIZATION Left 7/2/2019    Procedure: Left heart cath;  Surgeon: Mirela Ivan MD;  Location:  [facility] CATH;  Service: Cardiology;  Laterality: Left;    LEFT HEART CATHETERIZATION Left 5/27/2021    Procedure: Left heart cath;  Surgeon: Luis Henderson MD;  Location: [facility] CATH;  Service: Cardiovascular;  Laterality: Left;    LEFT HEART CATHETERIZATION Left 9/20/2022    Procedure: Left heart cath;  Surgeon: Luis Henderson MD;  Location: [facility] CATH;  Service: Cardiovascular;  Laterality: Left;    LEFT HEART CATHETERIZATION  8/1/2023    Procedure: Left heart cath;  Surgeon: Luis Pichardo MD;  Location: [facility] CATH;  Service: Cardiovascular;;    LEFT HEART CATHETERIZATION  4/15/2025    Procedure: Left heart cath;  Surgeon: Luis Pichardo MD;  Location: [facility] CATH;  Service: Cardiovascular;;    MASTECTOMY Bilateral     with bilateral reconstruction CHERY flaps    PERCUTANEOUS CORONARY INTERVENTION, ARTERY  4/15/2025    Procedure: PTCA OM;  Surgeon: Luis Pichardo MD;  Location: [facility] CATH;  Service: Cardiovascular;;    PTA, ARTERY, PERIPHERAL  6/9/2025    Procedure: PTA (Shockwave) / Atherectomy (CSI) Rt. SFA;  Surgeon: Luis Pichardo MD;  Location: [facility] CATH;  Service: Cardiovascular;;    RIGHT HEART CATHETERIZATION  8/1/2023    Procedure: Right heart cath;  Surgeon: Luis Pichardo MD;  Location: [facility] CATH;  Service: Cardiovascular;;       Time Tracking:     OT Date of Treatment: 06/16/25  OT Start Time: 1138  OT Stop Time: 1154  OT Total Time (min): 16 min    Billable Minutes:Evaluation 8  Self Care/Home Management 8    6/16/2025

## 2025-06-16 NOTE — PROGRESS NOTES
Patient is without complaints.  Left groin pain is much improved.  Afeb VSS  Left groin wound dressed  VINNY with 40 cc serosanguinous  Doppler signal right DP, poor signals left foot      Patient is status post emergently left femoral pseudoaneurysm repair, and evacuation of large hematoma.  Patient has been resumed on Plavix and Eliquis, which she is tolerating.  She will be rescheduled for EGD on an outpatient basis.    Patient has chronic PAD, and needs to have a 2nd stage procedure in the right leg and may need a left leg evaluated as well by Dr. Henderson.    -OK for DC from a vascular standpoint  -will need VINNY teaching, plan to remove VINNY in office next week if she is to be discharged

## 2025-06-16 NOTE — CARE UPDATE
Patient admitted last night by nocturnist and seen again this morning.    PLan to start eliquis and resume plavix. Tolerating more of a diet and will not make her EGD tomorrow at [facility]. She will reschedule test. REsume home meds as tolerated.     Ok to transfer out ICU

## 2025-06-17 ENCOUNTER — PATIENT MESSAGE (OUTPATIENT)
Dept: CASE MANAGEMENT | Facility: HOSPITAL | Age: 70
End: 2025-06-17

## 2025-06-17 VITALS
SYSTOLIC BLOOD PRESSURE: 115 MMHG | DIASTOLIC BLOOD PRESSURE: 61 MMHG | WEIGHT: 212.06 LBS | BODY MASS INDEX: 36.2 KG/M2 | HEIGHT: 64 IN | TEMPERATURE: 98 F | RESPIRATION RATE: 29 BRPM | OXYGEN SATURATION: 96 % | HEART RATE: 77 BPM

## 2025-06-17 LAB
ABSOLUTE EOSINOPHIL (SMH): 0.13 K/UL
ABSOLUTE MONOCYTE (SMH): 0.59 K/UL (ref 0.3–1)
ABSOLUTE NEUTROPHIL COUNT (SMH): 2.5 K/UL (ref 1.8–7.7)
ALBUMIN SERPL-MCNC: 2.5 G/DL (ref 3.5–5.2)
ALP SERPL-CCNC: 102 UNIT/L (ref 55–135)
ALT SERPL-CCNC: 15 UNIT/L (ref 10–44)
ANION GAP (SMH): 8 MMOL/L (ref 8–16)
AST SERPL-CCNC: 12 UNIT/L (ref 10–40)
BASOPHILS # BLD AUTO: 0.03 K/UL
BASOPHILS NFR BLD AUTO: 0.8 %
BILIRUB SERPL-MCNC: 0.5 MG/DL (ref 0.1–1)
BUN SERPL-MCNC: 27 MG/DL (ref 8–23)
CALCIUM SERPL-MCNC: 7.3 MG/DL (ref 8.7–10.5)
CHLORIDE SERPL-SCNC: 103 MMOL/L (ref 95–110)
CO2 SERPL-SCNC: 24 MMOL/L (ref 23–29)
CREAT SERPL-MCNC: 1.9 MG/DL (ref 0.5–1.4)
ERYTHROCYTE [DISTWIDTH] IN BLOOD BY AUTOMATED COUNT: 20.8 % (ref 11.5–14.5)
GFR SERPLBLD CREATININE-BSD FMLA CKD-EPI: 29 ML/MIN/1.73/M2
GLUCOSE SERPL-MCNC: 203 MG/DL (ref 70–110)
HCT VFR BLD AUTO: 22.8 % (ref 37–48.5)
HGB BLD-MCNC: 7.8 GM/DL (ref 12–16)
IMM GRANULOCYTES # BLD AUTO: 0.07 K/UL (ref 0–0.04)
IMM GRANULOCYTES NFR BLD AUTO: 1.8 % (ref 0–0.5)
LYMPHOCYTES # BLD AUTO: 0.55 K/UL (ref 1–4.8)
MAGNESIUM SERPL-MCNC: 1.9 MG/DL (ref 1.6–2.6)
MCH RBC QN AUTO: 32.6 PG (ref 27–31)
MCHC RBC AUTO-ENTMCNC: 34.2 G/DL (ref 32–36)
MCV RBC AUTO: 95 FL (ref 82–98)
NUCLEATED RBC (/100WBC) (SMH): 2 /100 WBC
PLATELET # BLD AUTO: 210 K/UL (ref 150–450)
PMV BLD AUTO: 9.8 FL (ref 9.2–12.9)
POCT GLUCOSE: 184 MG/DL (ref 70–110)
POCT GLUCOSE: 190 MG/DL (ref 70–110)
POTASSIUM SERPL-SCNC: 3.2 MMOL/L (ref 3.5–5.1)
PROT SERPL-MCNC: 4.7 GM/DL (ref 6–8.4)
RBC # BLD AUTO: 2.39 M/UL (ref 4–5.4)
RELATIVE EOSINOPHIL (SMH): 3.4 % (ref 0–8)
RELATIVE LYMPHOCYTE (SMH): 14.3 % (ref 18–48)
RELATIVE MONOCYTE (SMH): 15.4 % (ref 4–15)
RELATIVE NEUTROPHIL (SMH): 64.3 % (ref 38–73)
SODIUM SERPL-SCNC: 135 MMOL/L (ref 136–145)
WBC # BLD AUTO: 3.84 K/UL (ref 3.9–12.7)

## 2025-06-17 PROCEDURE — 25000242 PHARM REV CODE 250 ALT 637 W/ HCPCS: Performed by: SURGERY

## 2025-06-17 PROCEDURE — 80053 COMPREHEN METABOLIC PANEL: CPT | Performed by: SURGERY

## 2025-06-17 PROCEDURE — 83735 ASSAY OF MAGNESIUM: CPT | Performed by: SURGERY

## 2025-06-17 PROCEDURE — 25000003 PHARM REV CODE 250: Performed by: SURGERY

## 2025-06-17 PROCEDURE — 25000003 PHARM REV CODE 250: Performed by: HOSPITALIST

## 2025-06-17 PROCEDURE — 82962 GLUCOSE BLOOD TEST: CPT

## 2025-06-17 PROCEDURE — 94640 AIRWAY INHALATION TREATMENT: CPT

## 2025-06-17 PROCEDURE — 25000003 PHARM REV CODE 250: Performed by: INTERNAL MEDICINE

## 2025-06-17 PROCEDURE — 85025 COMPLETE CBC W/AUTO DIFF WBC: CPT | Performed by: SURGERY

## 2025-06-17 RX ORDER — PANTOPRAZOLE SODIUM 40 MG/1
40 TABLET, DELAYED RELEASE ORAL DAILY
Qty: 90 TABLET | Refills: 3 | Status: SHIPPED | OUTPATIENT
Start: 2025-06-18 | End: 2026-06-18

## 2025-06-17 RX ORDER — OXYCODONE AND ACETAMINOPHEN 10; 325 MG/1; MG/1
1 TABLET ORAL EVERY 6 HOURS PRN
Qty: 28 TABLET | Refills: 0
Start: 2025-06-17 | End: 2025-06-24

## 2025-06-17 RX ORDER — OXYCODONE AND ACETAMINOPHEN 10; 325 MG/1; MG/1
1 TABLET ORAL EVERY 4 HOURS PRN
Start: 2025-06-17 | End: 2025-06-17

## 2025-06-17 RX ADMIN — MUPIROCIN 1 G: 20 OINTMENT TOPICAL at 08:06

## 2025-06-17 RX ADMIN — APIXABAN 5 MG: 5 TABLET, FILM COATED ORAL at 08:06

## 2025-06-17 RX ADMIN — CLOPIDOGREL 75 MG: 75 TABLET ORAL at 08:06

## 2025-06-17 RX ADMIN — PANTOPRAZOLE SODIUM 40 MG: 40 TABLET, DELAYED RELEASE ORAL at 06:06

## 2025-06-17 RX ADMIN — OXYCODONE HYDROCHLORIDE AND ACETAMINOPHEN 1 TABLET: 10; 325 TABLET ORAL at 08:06

## 2025-06-17 RX ADMIN — LETROZOLE 2.5 MG: 2.5 TABLET ORAL at 08:06

## 2025-06-17 RX ADMIN — IPRATROPIUM BROMIDE 0.5 MG: 0.5 SOLUTION RESPIRATORY (INHALATION) at 01:06

## 2025-06-17 RX ADMIN — IPRATROPIUM BROMIDE 0.5 MG: 0.5 SOLUTION RESPIRATORY (INHALATION) at 08:06

## 2025-06-17 RX ADMIN — LEVOTHYROXINE SODIUM 112 MCG: 112 TABLET ORAL at 06:06

## 2025-06-17 RX ADMIN — SUCRALFATE ORAL 1 G: 1 SUSPENSION ORAL at 11:06

## 2025-06-17 RX ADMIN — INSULIN GLARGINE 10 UNITS: 100 INJECTION, SOLUTION SUBCUTANEOUS at 08:06

## 2025-06-17 RX ADMIN — ARFORMOTEROL TARTRATE 15 MCG: 15 SOLUTION RESPIRATORY (INHALATION) at 08:06

## 2025-06-17 RX ADMIN — SUCRALFATE ORAL 1 G: 1 SUSPENSION ORAL at 06:06

## 2025-06-17 NOTE — PT/OT/SLP PROGRESS
Occupational Therapy      Patient Name:  Yasmin Verde   MRN:  [id number]    Patient not seen today secondary to Other (Comment) (Pt dischaged prior to OT session).    6/17/2025

## 2025-06-17 NOTE — PLAN OF CARE
Problem: Adult Inpatient Plan of Care  Goal: Plan of Care Review  Outcome: Adequate for Care Transition  Goal: Patient-Specific Goal (Individualized)  Outcome: Adequate for Care Transition  Goal: Absence of Hospital-Acquired Illness or Injury  Outcome: Adequate for Care Transition  Goal: Optimal Comfort and Wellbeing  Outcome: Adequate for Care Transition  Goal: Readiness for Transition of Care  Outcome: Adequate for Care Transition     Problem: Diabetes Comorbidity  Goal: Blood Glucose Level Within Targeted Range  Outcome: Adequate for Care Transition     Problem: Acute Kidney Injury/Impairment  Goal: Fluid and Electrolyte Balance  Outcome: Adequate for Care Transition  Goal: Improved Oral Intake  Outcome: Adequate for Care Transition  Goal: Effective Renal Function  Outcome: Adequate for Care Transition     Problem: Wound  Goal: Optimal Coping  Outcome: Adequate for Care Transition  Goal: Optimal Functional Ability  Outcome: Adequate for Care Transition  Goal: Absence of Infection Signs and Symptoms  Outcome: Adequate for Care Transition  Goal: Improved Oral Intake  Outcome: Adequate for Care Transition  Goal: Optimal Pain Control and Function  Outcome: Adequate for Care Transition  Goal: Skin Health and Integrity  Outcome: Adequate for Care Transition  Goal: Optimal Wound Healing  Outcome: Adequate for Care Transition     Problem: Fall Injury Risk  Goal: Absence of Fall and Fall-Related Injury  Outcome: Adequate for Care Transition     Problem: Skin Injury Risk Increased  Goal: Skin Health and Integrity  Outcome: Adequate for Care Transition     Problem: Infection  Goal: Absence of Infection Signs and Symptoms  Outcome: Adequate for Care Transition

## 2025-06-17 NOTE — PLAN OF CARE
SW called pt to review discharge recommendation of HH and she is agreeable to plan.    Patient/family provided with a list of agencies / facilities in-network with patient's payor plan. Providers that are owned, operated, or affiliated with [facility] are included on the list.     Notified that referrals will be sent to the below listed agencies / facilities from in-network list based on proximity to home / family support:   1.  2.  3.  4.  5. (can send more than 5)    Patient / family instructed to identify preference.    Preferred Agency / Facility: (if more than 1, listed in order of descending preference)  [facility]    If an additional preferred agency / facility not listed above is identified, additional referral to be sent. If above agencies / facilities unable to accept, will send additional referrals to in-network providers.      Pt shared that she does not want PT and OT. Only a nurse to check incision site. RNCN notified.     06/17/25 1037   Post-Acute Status   Post-Acute Authorization Home Health   Patient choice form signed by patient/caregiver List with quality metrics by geographic area provided   Discharge Delays None known at this time   Discharge Plan   Discharge Plan A Home Health   Discharge Plan B Home Health

## 2025-06-17 NOTE — PLAN OF CARE
Met with pt and spouse and notified [facility] unable to staff. They would like referral to [facility]    Unable to schedule TCC with PCP in required time. Message to discharge clinic

## 2025-06-17 NOTE — PLAN OF CARE
FERNANDO sent patient information to [facility] via [employer]. Per [place], they are unable to staff at this time.      FERNANDO sent secure message to [place] requesting patient's preference.  Per [place], FERNANDO sent patient information to [facility].       06/17/25 1111   Post-Acute Status   Post-Acute Authorization [facility]   Home Health Status Referrals Sent   Patient choice form signed by patient/caregiver List from [employer]

## 2025-06-17 NOTE — PHYSICIAN QUERY
Please clarify Left femoral artery pseudoaneurysm as it relates to the 6/9/2025 abdominal angiogram with shockwave/atherectomy of the right SFA and right leg angiogram procedure.  Clinically undetermined

## 2025-06-17 NOTE — PLAN OF CARE
Patient accepted by [facility] with start of care 06/18/2025.       06/17/25 1212   Post-Acute Status   Post-Acute Authorization [facility] Status Set-up Complete/Auth obtained

## 2025-06-17 NOTE — PLAN OF CARE
TCC with discharge clinic 6/23/25 0900. Pt reports receiving call with post op f/u. To discharge home with [facility] with SOC 6/18/25.  Discussed with pt and spouse and no further discharge needs. Discharge orders and chart reviewed with no further post-acute discharge needs identified at this time.  At this time, patient is cleared for discharge from Case Management standpoint.        06/17/25 1249   Final Note   Assessment Type Final Discharge Note   Anticipated Discharge Disposition Home-Health   What phone number can be called within the next 1-3 days to see how you are doing after discharge? [phone]   Hospital Resources/Appts/Education Provided Appointments scheduled and added to AVS   Post-Acute Status   Post-Acute Authorization Home Health   Home Health Status Set-up Complete/Auth obtained   Discharge Delays None known at this time

## 2025-06-17 NOTE — RESPIRATORY THERAPY
06/16/25 1936   Patient Assessment/Suction   Level of Consciousness (AVPU) alert   Respiratory Effort Normal;Unlabored   Expansion/Accessory Muscles/Retractions no use of accessory muscles   All Lung Fields Breath Sounds Anterior:;clear   Rhythm/Pattern, Respiratory unlabored;pattern regular;depth regular;no shortness of breath reported   PRE-TX-O2   Device (Oxygen Therapy) room air   SpO2 (!) 92 %   Pulse Oximetry Type Continuous   $ Pulse Oximetry - Multiple Charge Pulse Oximetry - Multiple   Pulse 86   Resp 20   Aerosol Therapy   $ Aerosol Therapy Charges Aerosol Treatment   Daily Review of Necessity (SVN) completed   Respiratory Treatment Status (SVN) given   Treatment Route (SVN) mask;oxygen   Patient Position HOB elevated   Post Treatment Assessment (SVN) breath sounds unchanged   Signs of Intolerance (SVN) none   Breath Sounds Post-Respiratory Treatment   Throughout All Fields Post-Treatment no change   Post-treatment Heart Rate (beats/min) 86   Post-treatment Resp Rate (breaths/min) 20   Preset CPAP/BiPAP Settings   Mode Of Delivery CPAP;standby  (pt wears a home cpap, did not bring, set up v60)   $ CPAP/BiPAP Daily Charge 1   CPAP/BIPAP charged w/in last 24 h YES   $ Initial CPAP/BiPAP Setup? Yes   Sized Appropriately? Yes   Equipment Type V60   Airway Device Type medium full face mask   Humidifier not applicable   CPAP (cm H2O) 8   Education   $ Education 15 min;Bronchodilator;Other (see comment)   Respiratory Evaluation   $ Care Plan Tech Time 15 min

## 2025-06-18 LAB
ABO + RH BLD: NORMAL
ABO + RH BLD: NORMAL
BLD PROD TYP BPU: NORMAL
BLD PROD TYP BPU: NORMAL
BLOOD UNIT EXPIRATION DATE: NORMAL
BLOOD UNIT EXPIRATION DATE: NORMAL
BLOOD UNIT TYPE CODE: 5100
BLOOD UNIT TYPE CODE: 5100
CROSSMATCH INTERPRETATION: NORMAL
CROSSMATCH INTERPRETATION: NORMAL
DISPENSE STATUS: NORMAL
DISPENSE STATUS: NORMAL
UNIT NUMBER: NORMAL
UNIT NUMBER: NORMAL

## 2025-06-18 NOTE — ASSESSMENT & PLAN NOTE
Anemia is likely due to acute blood loss which was from hematoma . Most recent hemoglobin and hematocrit are listed below.  Recent Labs     06/16/25  0550 06/16/25  1708 06/16/25  1709 06/17/25  0335   HGB 8.8* 8.7*  --  7.8*   HCT 25.3*  --  24.7* 22.8*     Plan  - Monitor serial CBC: Daily  - Transfuse PRBC if patient becomes hemodynamically unstable, symptomatic or H/H drops below 7/21.  - Patient has received 2 units of PRBCs on 6/14  - Patient's anemia is currently stable

## 2025-06-18 NOTE — ASSESSMENT & PLAN NOTE
Patient's blood pressure range in the last 24 hours was: BP  Min: 98/49  Max: 119/54.The patient's inpatient anti-hypertensive regimen is listed below:  Current Antihypertensives       Plan  - BP is controlled, no changes needed to their regimen

## 2025-06-18 NOTE — DISCHARGE SUMMARY
"[facility] Medicine  Discharge Summary      Patient Name: Yasmin Verde  MRN: [id number]  TU: [id number]  Patient Class: IP- Inpatient  Admission Date: 6/14/2025  Hospital Length of Stay: 3 days  Discharge Date and Time: 6/17/2025  1:55 PM  Attending Physician: Willa att. providers found   Discharging Provider: Vivienne Garcia MD  Primary Care Provider: Freida Warren MD    Primary Care Team: Networked reference to record PCT     HPI:   69-year-old  female with a history of breast cancer, status post chemotherapy/radiation (currently on Imbrance/Femara), hypertension, obstructive sleep apnea (does not use device), hypothyroidism, diabetes, and peripheral arterial disease  The patient has chronic "neuropathy" in her feet and she had progressively worsening symptoms with right foot erythema and swollen told-> at some point, she had purplish discoloration  She was recently admitted at [facility] (6/6-6/12) and during that time underwent abdominal angiogram with shockwave/atherectomy of the right SFA and right leg angiogram  The patient had improvement in her PID symptoms but on the night prior to presentation, she had developed lower abdominal pain and then this morning had a "knot" in her left groin (where they angiogram occurred) that has gotten progressively larger  At [facility], she had a low-grade temperature of 99.5° F and heart rate was in the 110s (later 80s); she was hemodynamically stable with 100% sats  Hemoglobin went from 10.1-> 7.7 in a matter of 4 hours; creatinine was 1.83 (baseline) and CO2 is 20 with an anion gap of 17 (glucose was 177)  Left lower extremity arterial ultrasound showed, per radiologist Dr. Mendoza:    "Large multilobulated left common femoral artery pseudoaneurysm with 5 mm neck."    Patient received, in total, 3.5 mg of Dilaudid, a L of fluid, and Zofran was transferred here for further diagnosis, treatment, and " care  While here, she is afebrile and hemodynamically stable although at 1 point she did drop her pressure 92/42 (MAP 59)-> protamine is forthcoming as per Vascular surgery who is aware of her case and facilitated transferring her here to us  Repeat CBC/CMP are pending as well as coags and type and screen    Procedure(s) (LRB):  EVACUATION, HEMATOMA, INGUINAL REGION (Left)      Hospital Course:   Patient is s/p  left femoral artery pseudoaneurysm repair per Vascular surgery 6/14/25.  Intraoperatively, she had received 2 units PRBCs.  Patient has had a stable/uncomplicated postoperative course.  She remains hemodynamically stable and now complains of 0/10 pain.  Her GENE drain remains in place. She has chronic dysphagia condition and was scheduled to have esophageal dilation 6/16 which will need to be rescheduled. She is on liquid-soft diet with still concerns regarding swallowing, though better.  PLan for dc home tomorrow with  services. Follow up vascular surgery for Gene drain removal.     Your medications have changed   START taking:  clopidogreL (PLAVIX)   ELIQUIS (apixaban)    CHANGE how you take:  oxyCODONE-acetaminophen (PERCOCET)    PAUSE taking:  olmesartan 5 MG Tab (BENICAR)    STOP taking:  aspirin 81 MG EC tablet (ECOTRIN)   enoxaparin 100 mg/mL Syrg (LOVENOX)        Goals of Care Treatment Preferences:  Code Status: Full Code      SDOH Screening:  The patient was screened for utility difficulties, food insecurity, transport difficulties, housing insecurity, and interpersonal safety and there were no concerns identified this admission.     Consults:   Consults (From admission, onward)          Status Ordering Provider     Inpatient consult to Gastroenterology  Once        Provider:  JASON Ghosh MD    Completed BEATRICE MONTALVO     Inpatient consult to Vascular Surgery  Once        Provider:  Khoobehi, Ali, MD    Completed GIRMA CERON            Assessment & Plan  Essential  hypertension  Patient's blood pressure range in the last 24 hours was: BP  Min: 98/49  Max: 119/54.The patient's inpatient anti-hypertensive regimen is listed below:  Current Antihypertensives       Plan  - BP is controlled, no changes needed to their regimen    Type 2 diabetes mellitus without complication, without long-term current use of insulin    Sliding scale insulin per protocol  Add lantus BID as taken at home   Hemoglobin A1c 7.9 this month  History of breast cancer    Hold home Ibrance/Femara for now until acute issues stabilize  Acquired hypothyroidism    Home Synthroid/Cytomel resumed  TSH nonsuppressed at 9.320 in February-> will recheck TSH and add free T4  CKD (chronic kidney disease) stage 3, GFR 30-59 ml/min  At baseline  Minimize nephrotoxic medications  Follow daily weights, strict Is&Os, and clinical exam  Daily BMP  Pseudoaneurysm of left femoral artery  In the context of recent angiography  Rapidly progressing left inguinal swelling throughout the day, now was precipitous drop in hemoglobin; for now:  Protamine as ordered by vascular surgery  Check stat CBC/CMP/coags/type and screen-> transfuse for hemoglobin less than 7.0 and/or development of hypotension  Trend H/H daily   Plavix and eliquis initiated   Vascular surgery consult  S/p evacuation of hematoma 6/14  Acute blood loss anemia  Anemia is likely due to acute blood loss which was from hematoma . Most recent hemoglobin and hematocrit are listed below.  Recent Labs     06/16/25  0550 06/16/25  1708 06/16/25  1709 06/17/25  0335   HGB 8.8* 8.7*  --  7.8*   HCT 25.3*  --  24.7* 22.8*     Plan  - Monitor serial CBC: Daily  - Transfuse PRBC if patient becomes hemodynamically unstable, symptomatic or H/H drops below 7/21.  - Patient has received 2 units of PRBCs on 6/14  - Patient's anemia is currently stable    Dysphagia      Final Active Diagnoses:    Diagnosis Date Noted POA    PRINCIPAL PROBLEM:  Pseudoaneurysm of left femoral artery  [I72.4] 06/14/2025 Yes    Acute blood loss anemia [D62] 06/14/2025 Yes    CKD (chronic kidney disease) stage 3, GFR 30-59 ml/min [N18.30] 07/12/2024 Yes    Dysphagia [R13.10] 12/27/2017 Yes    Acquired hypothyroidism [E03.9] 05/24/2017 Yes    Essential hypertension [I10] 08/06/2015 Yes    Type 2 diabetes mellitus without complication, without long-term current use of insulin [E11.9] 08/06/2015 Yes    History of breast cancer [Z85.3] 08/06/2015 Not Applicable      Problems Resolved During this Admission:       Discharged Condition: good    Disposition: Home-Health Care [facility]    Follow Up:   Contact information for follow-up providers       Khoobehi, Ali, MD. Schedule an appointment as soon as possible for a visit in 1 week(s).    Specialty: Vascular Surgery  Why: Office to call you with appointment tomorrow. Please call them if you don't hear from them by 4:00 pm  Contact information:  [address] [facility]  [address]  [facility] [address]  [phone]               Maehler, Jewel A., FNP. Go on 6/23/2025.    Specialty: Family Medicine  Why: Hospital follow up scheduled at 9:00 AM.  Contact information:  [address]  [address]  [facility] [address]  [phone]                       Contact information for after-discharge care       Home Medical Care       [facility] .    Service: Home Health Services  Contact information:  [address]  [facility] [address] [phone]                                 Patient Instructions:      Ambulatory referral/consult to Home Health   Referral Priority: Routine Referral Type: Home Health   Referral Reason: Specialty Services Required   Requested Specialty: Home Health Services   Number of Visits Requested: 1     Diet diabetic     Notify your health care provider if you experience any of the following:  temperature >100.4     Notify your health care provider if you experience any of the following:  persistent nausea and vomiting or diarrhea      Notify your health care provider if you experience any of the following:  severe uncontrolled pain     Notify your health care provider if you experience any of the following:  redness, tenderness, or signs of infection (pain, swelling, redness, odor or green/yellow discharge around incision site)     Notify your health care provider if you experience any of the following:  difficulty breathing or increased cough     Notify your health care provider if you experience any of the following:  persistent dizziness, light-headedness, or visual disturbances     Activity as tolerated       Significant Diagnostic Studies: N/A    Pending Diagnostic Studies:       None           Medications:  Reconciled Home Medications:      Medication List        PAUSE taking these medications      amLODIPine 5 MG tablet  Wait to take this until your doctor or other care provider tells you to start again.  Commonly known as: NORVASC  Take 1 tablet (5 mg total) by mouth once daily.     olmesartan 5 MG Tab  Wait to take this until your doctor or other care provider tells you to start again.  Commonly known as: BENICAR  Take 1 tablet (5 mg total) by mouth once daily.            START taking these medications      clopidogreL 75 mg tablet  Commonly known as: PLAVIX  Take 75 mg by mouth once daily.     ELIQUIS 5 mg Tab  Generic drug: apixaban  Take 1 tablet (5 mg total) by mouth 2 (two) times daily.            CHANGE how you take these medications      oxyCODONE-acetaminophen  mg per tablet  Commonly known as: PERCOCET  Take 1 tablet by mouth every 6 (six) hours as needed for Pain.  What changed: when to take this     STIOLTO RESPIMAT 2.5-2.5 mcg/actuation Mist  Generic drug: tiotropium-olodateroL  INHALE 2 INHALATIONS BY MOUTH  INTO THE LUNGS ONCE DAILY  (CONTROLLER)  What changed: See the new instructions.            CONTINUE taking these medications      ALPRAZolam 0.5 MG tablet  Commonly known as: XANAX  Take 0.5 mg by mouth as  needed for Anxiety.     glyBURIDE-metformin 5-500 mg 5-500 mg Tab  Commonly known as: GLUCOVANCE  Take 1 tablet by mouth 2 (two) times daily with meals.     LANTUS SOLOSTAR U-100 INSULIN 100 unit/mL (3 mL) Inpn pen  Generic drug: insulin glargine U-100 (Lantus)  Inject 10 Units into the skin 2 (two) times a day.     letrozole 2.5 mg Tab  Commonly known as: FEMARA  Take 1 tablet (2.5 mg total) by mouth once daily.     levothyroxine 112 MCG tablet  Commonly known as: SYNTHROID  Take 112 mcg by mouth before breakfast.     liothyronine 5 MCG Tab  Commonly known as: CYTOMEL  Take 10 mcg by mouth every evening.     metoprolol succinate 25 MG 24 hr tablet  Commonly known as: TOPROL-XL  Take 1 tablet (25 mg total) by mouth after lunch.     neomycin-polymyxin-dexamethasone 3.5 mg/g-10,000 unit/g-0.1 % Oint  Commonly known as: DEXACINE  Place 1 application  into both eyes 2 (two) times daily.     palbociclib 125 mg Tab  Take 125 mg by mouth once daily. On days 1-21 and 7 days off every 28 days     pantoprazole 40 MG tablet  Commonly known as: PROTONIX  Take 1 tablet (40 mg total) by mouth once daily.  Start taking on: June 18, 2025     sucralfate 100 mg/mL suspension  Commonly known as: CARAFATE  Take 10 mLs (1 g total) by mouth every 6 (six) hours. for 120 doses     ZINC GLUCONATE ORAL  Take 50 mg by mouth 3 (three) times a week.            STOP taking these medications      aspirin 81 MG EC tablet  Commonly known as: ECOTRIN     enoxaparin 100 mg/mL Syrg  Commonly known as: LOVENOX              Indwelling Lines/Drains at time of discharge:   Lines/Drains/Airways       Drain  Duration                  Closed/Suction Drain 06/15/25 0148 Tube - 1 Left Groin Bulb 15 Fr. 2 days                    Time spent on the discharge of patient: 40 minutes    Critical care time spent on the evaluation and treatment of severe organ dysfunction, review of pertinent labs and imaging studies, discussions with consulting providers and  discussions with patient/family: 15 minutes.     Vivienne Garcia MD  Department of Hospital Medicine  [facility]

## 2025-06-23 ENCOUNTER — OFFICE VISIT (OUTPATIENT)
Facility: CLINIC | Age: 70
End: 2025-06-23
Payer: MEDICARE

## 2025-06-23 ENCOUNTER — LAB VISIT (OUTPATIENT)
Dept: LAB | Facility: HOSPITAL | Age: 70
End: 2025-06-23
Payer: MEDICARE

## 2025-06-23 ENCOUNTER — NURSE TRIAGE (OUTPATIENT)
Dept: ADMINISTRATIVE | Facility: CLINIC | Age: 70
End: 2025-06-23
Payer: MEDICARE

## 2025-06-23 VITALS
WEIGHT: 199.75 LBS | OXYGEN SATURATION: 95 % | BODY MASS INDEX: 34.1 KG/M2 | TEMPERATURE: 97 F | DIASTOLIC BLOOD PRESSURE: 70 MMHG | HEART RATE: 47 BPM | SYSTOLIC BLOOD PRESSURE: 100 MMHG | HEIGHT: 64 IN

## 2025-06-23 DIAGNOSIS — I10 ESSENTIAL HYPERTENSION: ICD-10-CM

## 2025-06-23 DIAGNOSIS — I25.10 CORONARY ARTERY DISEASE DUE TO CALCIFIED CORONARY LESION: ICD-10-CM

## 2025-06-23 DIAGNOSIS — N18.30 STAGE 3 CHRONIC KIDNEY DISEASE, UNSPECIFIED WHETHER STAGE 3A OR 3B CKD: ICD-10-CM

## 2025-06-23 DIAGNOSIS — R13.10 DYSPHAGIA, UNSPECIFIED TYPE: ICD-10-CM

## 2025-06-23 DIAGNOSIS — I25.84 CORONARY ARTERY DISEASE DUE TO CALCIFIED CORONARY LESION: ICD-10-CM

## 2025-06-23 DIAGNOSIS — D62 ACUTE BLOOD LOSS ANEMIA: ICD-10-CM

## 2025-06-23 DIAGNOSIS — E11.9 TYPE 2 DIABETES MELLITUS WITHOUT COMPLICATION, WITHOUT LONG-TERM CURRENT USE OF INSULIN: ICD-10-CM

## 2025-06-23 DIAGNOSIS — I72.4 PSEUDOANEURYSM OF LEFT FEMORAL ARTERY: Primary | ICD-10-CM

## 2025-06-23 LAB
ABSOLUTE EOSINOPHIL (OHS): 0.19 K/UL
ABSOLUTE MONOCYTE (OHS): 0.87 K/UL (ref 0.3–1)
ABSOLUTE NEUTROPHIL COUNT (OHS): 4.64 K/UL (ref 1.8–7.7)
ANION GAP (OHS): 19 MMOL/L (ref 8–16)
BASOPHILS # BLD AUTO: 0.16 K/UL
BASOPHILS NFR BLD AUTO: 2.2 %
BUN SERPL-MCNC: 18 MG/DL (ref 8–23)
CALCIUM SERPL-MCNC: 9.2 MG/DL (ref 8.7–10.5)
CHLORIDE SERPL-SCNC: 100 MMOL/L (ref 95–110)
CO2 SERPL-SCNC: 21 MMOL/L (ref 23–29)
CREAT SERPL-MCNC: 1.7 MG/DL (ref 0.5–1.4)
ERYTHROCYTE [DISTWIDTH] IN BLOOD BY AUTOMATED COUNT: 22 % (ref 11.5–14.5)
GFR SERPLBLD CREATININE-BSD FMLA CKD-EPI: 33 ML/MIN/1.73/M2
GLUCOSE SERPL-MCNC: 207 MG/DL (ref 70–110)
HCT VFR BLD AUTO: 34.5 % (ref 37–48.5)
HGB BLD-MCNC: 10.8 GM/DL (ref 12–16)
IMM GRANULOCYTES # BLD AUTO: 0.09 K/UL (ref 0–0.04)
IMM GRANULOCYTES NFR BLD AUTO: 1.2 % (ref 0–0.5)
LYMPHOCYTES # BLD AUTO: 1.34 K/UL (ref 1–4.8)
MCH RBC QN AUTO: 33 PG (ref 27–31)
MCHC RBC AUTO-ENTMCNC: 31.3 G/DL (ref 32–36)
MCV RBC AUTO: 106 FL (ref 82–98)
NUCLEATED RBC (/100WBC) (OHS): 0 /100 WBC
PLATELET # BLD AUTO: 652 K/UL (ref 150–450)
PMV BLD AUTO: 10.1 FL (ref 9.2–12.9)
POTASSIUM SERPL-SCNC: 4.1 MMOL/L (ref 3.5–5.1)
RBC # BLD AUTO: 3.27 M/UL (ref 4–5.4)
RELATIVE EOSINOPHIL (OHS): 2.6 %
RELATIVE LYMPHOCYTE (OHS): 18.4 % (ref 18–48)
RELATIVE MONOCYTE (OHS): 11.9 % (ref 4–15)
RELATIVE NEUTROPHIL (OHS): 63.7 % (ref 38–73)
SODIUM SERPL-SCNC: 140 MMOL/L (ref 136–145)
WBC # BLD AUTO: 7.29 K/UL (ref 3.9–12.7)

## 2025-06-23 PROCEDURE — 36415 COLL VENOUS BLD VENIPUNCTURE: CPT | Mod: PN

## 2025-06-23 PROCEDURE — 80048 BASIC METABOLIC PNL TOTAL CA: CPT

## 2025-06-23 PROCEDURE — 99213 OFFICE O/P EST LOW 20 MIN: CPT | Mod: PBBFAC,PN

## 2025-06-23 PROCEDURE — 99999 PR PBB SHADOW E&M-EST. PATIENT-LVL III: CPT | Mod: PBBFAC,,,

## 2025-06-23 PROCEDURE — 99214 OFFICE O/P EST MOD 30 MIN: CPT | Mod: S$PBB,,,

## 2025-06-23 PROCEDURE — 85025 COMPLETE CBC W/AUTO DIFF WBC: CPT

## 2025-06-23 NOTE — PROGRESS NOTES
Subjective:  Chief Complaint   Patient presents with    Hospital Follow Up       History of Present Illness    Transitional Care Note    Family and/or Caretaker present at visit?  Yes, patient's   Diagnostic tests reviewed/disposition: No diagnosic tests pending after this hospitalization.  Disease/illness education: Education as listed below in assessment/plan section.  Home health/community services discussion/referrals: Patient has home health established at [facility] twice weekly.   Establishment or re-establishment of referral orders for community resources: No other necessary community resources.   Discussion with other health care providers: No discussion with other health care providers necessary.     This 69 y.o. presents today for complaint of hospital follow-up for hematoma evacuation.    She reports her blood sugar has been running 150s-190s.        History of Present Illness    CHIEF COMPLAINT:  Patient presents for a follow-up visit after a recent hospital stay for a groin hematoma and subsequent surgical intervention.    HPI:  Patient was recently discharged from the hospital after bleeding from her groin. She underwent a procedure to remove a hematoma that developed approximately 5 days after an angiogram performed the previous week. The hematoma began as a small bump, similar to a pimple, but rapidly grew to a significant size within hours, causing severe pain and prompting immediate medical attention.    During her hospital stay, patient received 2 units of blood. She reports feeling adequate since discharge, though recovery has been challenging. Home health nurses have been checking her groin site, which still has some swelling and a drain in place. The drainage has been significant but started to decrease yesterday.    Patient had a nosebleed last night from midnight to 3 AM, which took time to resolve. She contacted the on-call nurses for advice, who recommended using a nasal  spray. Patient reports significant dryness in her nose which she believed prompted the nose bleed.    Patient is having difficulty eating due to esophageal issues and reports needing to have her esophagus dilated again. She was initially unable to eat for the first few days in the hospital. While this resolved temporarily, she is now having difficulty again, reporting trouble swallowing soft foods without it foaming up in her esophagus.    Her blood sugar this morning was 168 or 170 mg/dL, as measured by her continuous glucose monitor. She has not been taking her usual diabetes medication, Glucophage, since being in and out of the hospital, but has been prescribed Lantus.    Patient has upcoming appointments with a cardiologist this week and a nephrologist on July 11th.    Patient denies fever since returning home and chest pain.    MEDICATIONS:  Patient is on Plavix and Eliquis. She is taking Lantus, 10 units twice daily, and Glucovance for diabetes. She is also on Protonix and Carafate. Patient has discontinued Benicar and amlodipine (Norvasc), both blood pressure medications, as well as baby aspirin.    SURGICAL HISTORY:  Patient recently underwent hematoma removal from the groin due to bleeding at the angiogram site. She also had a recent angiogram to clear a blockage in her leg    ROS:  General: -fever, +fatigue  ENT: +nosebleed, +dry mouth  Cardiovascular: -chest pain  Gastrointestinal: +difficulty swallowing         Review of Systems   Constitutional:  Negative for fever.   Respiratory:          Intermittent shortness of breath- chronic   Cardiovascular:  Negative for chest pain.   Gastrointestinal:  Positive for heartburn. Negative for nausea and vomiting.       (D/C Summary)  Admission Date: 6/14/2025  Hospital Length of Stay: 3 days  Discharge Date and Time: 6/17/2025  1:55 PM  Attending Physician: No att. providers found   Discharging Provider: Vivienne Garcia MD  Primary Care Provider: Kelvin  "Freida DELONG MD     Primary Care Team: Networked reference to record PCT      HPI:   69-year-old  female with a history of breast cancer, status post chemotherapy/radiation (currently on Imbrance/Femara), hypertension, obstructive sleep apnea (does not use device), hypothyroidism, diabetes, and peripheral arterial disease  The patient has chronic "neuropathy" in her feet and she had progressively worsening symptoms with right foot erythema and swollen told-> at some point, she had purplish discoloration  She was recently admitted at [facility] (6/6-6/12) and during that time underwent abdominal angiogram with shockwave/atherectomy of the right SFA and right leg angiogram  The patient had improvement in her PID symptoms but on the night prior to presentation, she had developed lower abdominal pain and then this morning had a "knot" in her left groin (where they angiogram occurred) that has gotten progressively larger  At [facility], she had a low-grade temperature of 99.5° F and heart rate was in the 110s (later 80s); she was hemodynamically stable with 100% sats  Hemoglobin went from 10.1-> 7.7 in a matter of 4 hours; creatinine was 1.83 (baseline) and CO2 is 20 with an anion gap of 17 (glucose was 177)  Left lower extremity arterial ultrasound showed, per radiologist Dr. Mendoza:     "Large multilobulated left common femoral artery pseudoaneurysm with 5 mm neck."     Patient received, in total, 3.5 mg of Dilaudid, a L of fluid, and Zofran was transferred here for further diagnosis, treatment, and care  While here, she is afebrile and hemodynamically stable although at 1 point she did drop her pressure 92/42 (MAP 59)-> protamine is forthcoming as per Vascular surgery who is aware of her case and facilitated transferring her here to us  Repeat CBC/CMP are pending as well as coags and type and screen     Procedure(s) (LRB):  EVACUATION, HEMATOMA, INGUINAL REGION (Left)       Hospital " "Course:   Patient is s/p  left femoral artery pseudoaneurysm repair per Vascular surgery 6/14/25.  Intraoperatively, she had received 2 units PRBCs.  Patient has had a stable/uncomplicated postoperative course.  She remains hemodynamically stable and now complains of 0/10 pain.  Her VINNY drain remains in place. She has chronic dysphagia condition and was scheduled to have esophageal dilation 6/16 which will need to be rescheduled. She is on liquid-soft diet with still concerns regarding swallowing, though better.  PLan for dc home tomorrow with  services. Follow up vascular surgery for Vinny drain removal.      Your medications have changed   START taking:  clopidogreL (PLAVIX)   ELIQUIS (apixaban)    CHANGE how you take:  oxyCODONE-acetaminophen (PERCOCET)    PAUSE taking:  olmesartan 5 MG Tab (BENICAR)    STOP taking:  aspirin 81 MG EC tablet (ECOTRIN)   enoxaparin 100 mg/mL Syrg (LOVENOX)        Objective:    /70 (BP Location: Left forearm, Patient Position: Sitting)   Pulse (!) 47   Temp 97.3 °F (36.3 °C) (Oral)   Ht 5' 4" (1.626 m)   Wt 90.6 kg (199 lb 11.8 oz)   SpO2 95%   BMI 34.28 kg/m²  Body mass index is 34.28 kg/m².    BP Readings from Last 3 Encounters:   06/23/25 100/70   06/17/25 115/61   06/14/25 138/86       Wt Readings from Last 3 Encounters:   06/23/25 90.6 kg (199 lb 11.8 oz)   06/14/25 96.2 kg (212 lb 1.3 oz)   06/14/25 90.3 kg (199 lb)       Physical Exam  Constitutional:       General: She is not in acute distress.  HENT:      Nose: Nose normal. No congestion or rhinorrhea.   Cardiovascular:      Rate and Rhythm: Normal rate and regular rhythm.   Pulmonary:      Effort: Pulmonary effort is normal. No respiratory distress.      Breath sounds: Normal breath sounds.   Abdominal:      General: Bowel sounds are normal.      Palpations: Abdomen is soft.   Musculoskeletal:      Comments: Left groin incision site dressing CDI, area firm to palpation, non-tender. No signs of infection noted. VINNY " drain in place with scant serosanguineous drainage in bulb. VINNY drain insertion site dressing CDI, no signs of infection noted.   Skin:     General: Skin is warm and dry.      Coloration: Skin is pale.   Neurological:      Mental Status: She is alert.      Comments: Responds appropriately to surroundings. Answers questions appropriately.    Psychiatric:         Mood and Affect: Mood normal.         Behavior: Behavior normal.         Thought Content: Thought content normal.         Judgment: Judgment normal.           Assessment/Plan:  1. Pseudoaneurysm of left femoral artery  Assessment & Plan:  Assessed recovery post-hematoma removal from groin on 6/14/25  - Examined the groin site where patient has a drain in place.  - Site is firm with some swelling, but is not painful and swelling is not increasing.  - Home health nurses have been monitoring the site.  - Apt with surgeon today to assess and potentially remove the drain during today's appointment.  - Instructed patient to monitor the groin site daily for swelling.  Follow-up Apt with Dr Khoobehi today       2. Essential hypertension  Assessment & Plan:  Stable and controlled per vitals in clinic- on lower side 100/70.  Continue to hold amlodipine and Olmesartan for now,  Continue Metoprolol as prescribed.  Cardiac diet- low salt/low sodium diet.  Follow-up with Cardiology- Apt 6/26/25.        3. Type 2 diabetes mellitus without complication, without long-term current use of insulin  Assessment & Plan:  Recent A1c 7.9.  Continue Lantus as prescribed.  Resume Glucovance BID.  Check blood glucose and keep log. Fasting AM blood glucose goal under 130.   2 hr post-meal glucose goal under 180.   Avoid foods with high sugar content such as sodas, juices, candies. Well-balanced diet including non-starchy vegetables, lean proteins, and whole grains.   Recommend routine diabetic eye exams, foot examinations, and dental care.  Follow-up with PCP.      4. Acute blood loss  anemia  Assessment & Plan:  Check CBC today, I will review and address accordingly.  6/24/25: H&H stable and improving.  Follow-up with vascular surgery  as scheduled.    Orders:  -     CBC Auto Differential; Future; Expected date: 06/23/2025    5. Dysphagia, unspecified type  Assessment & Plan:  Evaluated patient's esophageal issues requiring dilation, causing difficulty eating and drinking.  Patient sometimes experiences water foaming up in the esophagus.  Advised patient to use chin tuck technique when swallowing, stick with soft foods, and drink water while eating to manage esophageal difficulties.  Continued Protonix and Carafate for esophageal management.  Educated on importance of maintaining nutrition despite swallowing difficulties.  Advised to follow-up with GI to re-schedule esophageal dilation.      6. Coronary artery disease, multiple stents, mid LAD 3.5 X 40 ORSIRO RONALD,7/19  Assessment & Plan:  Hx of CAD s/p stent placement.   No signs/symptoms of angina.  Continue current prescription therapy.  Follow-up with Cardiology as scheduled.      7. Stage 3 chronic kidney disease, unspecified whether stage 3a or 3b CKD  Assessment & Plan:  BMP ordered, Cr level stable at 1.7, appears to be near patient's baseline.  Avoid NSAIDs.  Instructed to follow-up with Nephrology- Apt 7/11/25.      Orders:  -     Basic Metabolic Panel; Future; Expected date: 06/23/2025         Orders Placed This Encounter    CBC Auto Differential    Basic Metabolic Panel       Follow-ups as listed above.    Follow up if symptoms worsen or fail to improve.    This note was generated with the assistance of ambient listening technology. Verbal consent was obtained by the patient and accompanying visitor(s) for the recording of patient appointment to facilitate this note. I attest to having reviewed and edited the generated note for accuracy, though some syntax or spelling errors may persist. Please contact the author of this note for any  clarification.

## 2025-06-23 NOTE — TELEPHONE ENCOUNTER
states pt nose started bleeding, holding pressure approx 20 mins. Pt states on Plavix and Eliquis. Pt reports bleeding has eased at this time. Pt has appt with MD tomorrow. Per protocol, see pcp within 24 hours. Discussed at home care and symptoms to monitor for. Advised to call back for any further questions or concerns.   Reason for Disposition   Taking Coumadin (warfarin) or other strong blood thinner, or known bleeding disorder (e.g., thrombocytopenia)    Additional Information   Negative: Fainted or too weak to stand following large blood loss   Negative: Sounds like a life-threatening emergency to the triager   Negative: [1] Bleeding present > 30 minutes AND [2] using correct method of direct pressure   Negative: [1] Bleeding now AND [2] second call after being instructed in correct technique of direct pressure   Negative: Feeling weak or lightheaded (e.g., woozy, feeling like they might faint)   Negative: Pale skin (pallor) of new-onset or getting worse   Negative: [1] Has nasal packing (inserted by health care provider to control bleeding) AND [2] new rash   Negative: [1] Has nasal packing AND [2] now bleeding around the packing  (Exception: Few drops or ooze.)   Negative: Patient sounds very sick or weak to the triager   Negative: [1] Bleeding recurs 3 or more times in 24 hours AND [2] direct pressure applied correctly   Negative: [1] Skin bruises or bleeding gums AND [2] not caused by an injury   Negative: [1] Large amount of blood has been lost (e.g., 1 cup or 240 ml) AND [2] bleeding now controlled (stopped)   Negative: [1] Has nasal packing AND [2] fever > 100.4 F (38.0 C)    Protocols used: Nosebleed-A-     dental pain/injury

## 2025-06-23 NOTE — PATIENT INSTRUCTIONS
Khoobehi, Ali, MD. Schedule an appointment as soon as possible.   Specialty: Vascular Surgery  Contact information:  [facility]  [address]  [place] [id number] [phone]

## 2025-06-24 NOTE — ASSESSMENT & PLAN NOTE
Hx of CAD s/p stent placement.   No signs/symptoms of angina.  Continue current prescription therapy.  Follow-up with Cardiology as scheduled.

## 2025-06-24 NOTE — ASSESSMENT & PLAN NOTE
BMP ordered, Cr level stable at 1.7, appears to be near patient's baseline.  Avoid NSAIDs.  Instructed to follow-up with Nephrology- Apt 7/11/25.

## 2025-06-24 NOTE — ASSESSMENT & PLAN NOTE
Stable and controlled per vitals in clinic- on lower side 100/70.  Continue to hold amlodipine and Olmesartan for now,  Continue Metoprolol as prescribed.  Cardiac diet- low salt/low sodium diet.  Follow-up with Cardiology- Apt 6/26/25.

## 2025-06-24 NOTE — ASSESSMENT & PLAN NOTE
Recent A1c 7.9.  Continue Lantus as prescribed.  Resume Glucovance BID.  Check blood glucose and keep log. Fasting AM blood glucose goal under 130.   2 hr post-meal glucose goal under 180.   Avoid foods with high sugar content such as sodas, juices, candies. Well-balanced diet including non-starchy vegetables, lean proteins, and whole grains.   Recommend routine diabetic eye exams, foot examinations, and dental care.  Follow-up with PCP.

## 2025-06-24 NOTE — ASSESSMENT & PLAN NOTE
Check CBC today, I will review and address accordingly.  6/24/25: H&H stable and improving.  Follow-up with vascular surgery  as scheduled.

## 2025-06-24 NOTE — ASSESSMENT & PLAN NOTE
Assessed recovery post-hematoma removal from groin on 6/14/25  - Examined the groin site where patient has a drain in place.  - Site is firm with some swelling, but is not painful and swelling is not increasing.  - Home health nurses have been monitoring the site.  - Apt with surgeon today to assess and potentially remove the drain during today's appointment.  - Instructed patient to monitor the groin site daily for swelling.  Follow-up Apt with Dr Khoobehi today

## 2025-06-24 NOTE — ASSESSMENT & PLAN NOTE
Evaluated patient's esophageal issues requiring dilation, causing difficulty eating and drinking.  Patient sometimes experiences water foaming up in the esophagus.  Advised patient to use chin tuck technique when swallowing, stick with soft foods, and drink water while eating to manage esophageal difficulties.  Continued Protonix and Carafate for esophageal management.  Educated on importance of maintaining nutrition despite swallowing difficulties.  Advised to follow-up with GI to re-schedule esophageal dilation.

## 2025-06-25 ENCOUNTER — RESULTS FOLLOW-UP (OUTPATIENT)
Dept: FAMILY MEDICINE | Facility: CLINIC | Age: 70
End: 2025-06-25

## 2025-06-26 PROBLEM — Z71.89 ACP (ADVANCE CARE PLANNING): Status: RESOLVED | Noted: 2024-07-11 | Resolved: 2025-06-26

## 2025-07-11 ENCOUNTER — OFFICE VISIT (OUTPATIENT)
Dept: NEPHROLOGY | Facility: CLINIC | Age: 70
End: 2025-07-11
Payer: MEDICARE

## 2025-07-11 VITALS
BODY MASS INDEX: 34.33 KG/M2 | HEART RATE: 77 BPM | OXYGEN SATURATION: 98 % | SYSTOLIC BLOOD PRESSURE: 130 MMHG | DIASTOLIC BLOOD PRESSURE: 60 MMHG | HEIGHT: 64 IN

## 2025-07-11 DIAGNOSIS — I25.10 CORONARY ARTERY DISEASE DUE TO CALCIFIED CORONARY LESION: ICD-10-CM

## 2025-07-11 DIAGNOSIS — I25.84 CORONARY ARTERY DISEASE DUE TO CALCIFIED CORONARY LESION: ICD-10-CM

## 2025-07-11 DIAGNOSIS — E87.6 HYPOKALEMIA: ICD-10-CM

## 2025-07-11 DIAGNOSIS — E66.812 CLASS 2 SEVERE OBESITY WITH SERIOUS COMORBIDITY AND BODY MASS INDEX (BMI) OF 36.0 TO 36.9 IN ADULT, UNSPECIFIED OBESITY TYPE: ICD-10-CM

## 2025-07-11 DIAGNOSIS — N18.32 STAGE 3B CHRONIC KIDNEY DISEASE: Primary | ICD-10-CM

## 2025-07-11 DIAGNOSIS — E66.01 CLASS 2 SEVERE OBESITY WITH SERIOUS COMORBIDITY AND BODY MASS INDEX (BMI) OF 36.0 TO 36.9 IN ADULT, UNSPECIFIED OBESITY TYPE: ICD-10-CM

## 2025-07-11 DIAGNOSIS — E66.812 CLASS 2 OBESITY WITH BODY MASS INDEX (BMI) OF 35.0 TO 35.9 IN ADULT, UNSPECIFIED OBESITY TYPE, UNSPECIFIED WHETHER SERIOUS COMORBIDITY PRESENT: ICD-10-CM

## 2025-07-11 PROCEDURE — 99213 OFFICE O/P EST LOW 20 MIN: CPT | Mod: PBBFAC,PN | Performed by: STUDENT IN AN ORGANIZED HEALTH CARE EDUCATION/TRAINING PROGRAM

## 2025-07-11 PROCEDURE — 99999 PR PBB SHADOW E&M-EST. PATIENT-LVL III: CPT | Mod: PBBFAC,,, | Performed by: STUDENT IN AN ORGANIZED HEALTH CARE EDUCATION/TRAINING PROGRAM

## 2025-07-11 RX ORDER — FENTANYL 25 UG/1
1 PATCH TRANSDERMAL
COMMUNITY

## 2025-07-11 RX ORDER — POTASSIUM CHLORIDE 750 MG/1
10 TABLET, EXTENDED RELEASE ORAL 2 TIMES DAILY
Qty: 60 TABLET | Refills: 2 | Status: SHIPPED | OUTPATIENT
Start: 2025-07-11 | End: 2025-10-09

## 2025-07-11 NOTE — PROGRESS NOTES
[facility]  Nephrology Clinic    Subjective:       HPI ID: Yasmin Verde is a 69 y.o. female who returns for ongoing evaluation and management of CKD.   Yasmin Verde is referred by Freida Warren MD to be evaluated for chronic renal failure.  She was previously followed by me for chronic kidney disease management under a different practice setting.  She has ongoing medical conditions of coronary artery disease status PCI times 10, hypothyroidism, right breast cancer metastatic to left hip, hypertension, chronic esophageal stricture with history of dilations, diabetes type 2 not a long-term use of insulin, LALO on CPAP, restrictive lung disease, sicca syndrome, and chronic kidney disease stage IIIB.  We reviewed her recent lab trends at chair side.  Last chemistry panel was from 09/16/2024 and featured a serum creatinine 1.8 mg/dL which is consistent with her trends over the last 12 months.    Pulmonology: Mark  Cardiology: Beatriz  Oncology: Serafin    Interval history:  10/11/24 - here today to establish continued CKD care. Continues to follow with oncology for regular checks. She reports stable trends of her parameters and PET scan every 6 months.   She has chronic complaints of dry mouth - rx diagnosed with Sicca syndrome. Reviewed medication list with patient.  She has no uremic or urinary symptoms and is in her usual state of health.    Labs reviewed with patient at chairside.     3/12/25 - here today for f/u evaluation. Continues to follow with oncology for metastatic breast cancer; stable disease process per her report. She feels well. Denies acute complaints. We reviewed recent labs at chairside. Reports her glucose has been uptrending lately d/t running out of her oral DM medications - she tells me she is going to discuss with her PCP about starting insulin.  Renal function based on serum creatinine trend remains at baseline.    7/11/25 - here for f/u. Feels tired /  lethargic. Recently treated for UTI two weeks ago. Denies further dysuria. We reviewed recent labs at chairside. Renal function based on serum creatinine trend remains at baseline. She does have development of hypokalemia which may explain her symptoms. Will start Mg and K supplementation for her. Restart home ARB.     Review of Systems   Constitutional:  Negative for chills, diaphoresis and fever.   Respiratory:  Negative for cough and shortness of breath.    Cardiovascular:  Negative for chest pain and leg swelling.   Gastrointestinal:  Negative for abdominal pain, diarrhea, nausea and vomiting.   Genitourinary:  Negative for difficulty urinating, dysuria and hematuria.   Musculoskeletal:  Negative for myalgias.   Neurological:  Negative for headaches.   Hematological:  Does not bruise/bleed easily.       The past medical, family and social histories were reviewed for this encounter.     Past Medical History:   Diagnosis Date    Cancer     right breast with mets to left hip     Chronic kidney disease, unspecified     Chronic kidney disease, unspecified     Coronary artery disease     Diabetes mellitus     Dizziness     Headache(784.0)     Hypertension     Hypothyroidism     Obesity     Otitis media     Renal disorder     CKD    Sleep apnea        Current Outpatient Medications   Medication Sig    apixaban (ELIQUIS) 5 mg Tab Take 1 tablet (5 mg total) by mouth 2 (two) times daily.    clopidogreL (PLAVIX) 75 mg tablet Take 75 mg by mouth once daily.    fentaNYL (DURAGESIC) 25 mcg/hr Place 1 patch onto the skin every 72 hours.    glyBURIDE-metformin 5-500 mg (GLUCOVANCE) 5-500 mg Tab Take 1 tablet by mouth 2 (two) times daily with meals.    LANTUS SOLOSTAR U-100 INSULIN 100 unit/mL (3 mL) InPn pen Inject 10 Units into the skin 2 (two) times a day.    letrozole (FEMARA) 2.5 mg Tab Take 1 tablet (2.5 mg total) by mouth once daily.    levothyroxine (SYNTHROID) 112 MCG tablet Take 112 mcg by mouth before breakfast.     "liothyronine (CYTOMEL) 5 MCG Tab Take 10 mcg by mouth every evening.    metoprolol succinate (TOPROL-XL) 25 MG 24 hr tablet Take 1 tablet (25 mg total) by mouth after lunch.    neomycin-polymyxin-dexamethasone (DEXACINE) 3.5 mg/g-10,000 unit/g-0.1 % Oint Place 1 application  into both eyes 2 (two) times daily.    palbociclib 125 mg Tab Take 125 mg by mouth once daily. On days 1-21 and 7 days off every 28 days    pantoprazole (PROTONIX) 40 MG tablet Take 1 tablet (40 mg total) by mouth once daily.    STIOLTO RESPIMAT 2.5-2.5 mcg/actuation Mist INHALE 2 INHALATIONS BY MOUTH  INTO THE LUNGS ONCE DAILY  (CONTROLLER)    sucralfate (CARAFATE) 100 mg/mL suspension Take 10 mLs (1 g total) by mouth every 6 (six) hours. for 120 doses    ZINC GLUCONATE ORAL Take 50 mg by mouth 3 (three) times a week.    ALPRAZolam (XANAX) 0.5 MG tablet Take 0.5 mg by mouth as needed for Anxiety. (Patient not taking: Reported on 7/11/2025)    evolocumab (REPATHA SURECLICK) 140 mg/mL PnIj Inject 1 mL (140 mg total) into the skin every 14 (fourteen) days. (Patient not taking: Reported on 7/11/2025)    olmesartan (BENICAR) 5 MG Tab Take 1 tablet (5 mg total) by mouth once daily. (Patient not taking: Reported on 7/11/2025)    potassium chloride (KLOR-CON) 10 MEQ TbSR Take 1 tablet (10 mEq total) by mouth 2 (two) times daily.     No current facility-administered medications for this visit.         Objective:   /60 (BP Location: Left arm, Patient Position: Sitting)   Pulse 77   Ht 5' 4" (1.626 m)   SpO2 98%   BMI 34.33 kg/m²      Physical Exam  Vitals reviewed.   Constitutional:       General: She is not in acute distress.     Appearance: Normal appearance.   Cardiovascular:      Pulses: Normal pulses.      Heart sounds: Normal heart sounds.      No friction rub.   Pulmonary:      Effort: Pulmonary effort is normal. No respiratory distress.      Breath sounds: Normal breath sounds.   Abdominal:      General: Abdomen is flat.      " Palpations: Abdomen is soft.   Musculoskeletal:         General: No swelling.      Right lower leg: No edema.      Left lower leg: No edema.   Neurological:      General: No focal deficit present.      Mental Status: She is oriented to person, place, and time.      Motor: No weakness.   Psychiatric:         Mood and Affect: Mood normal.         Behavior: Behavior normal.         Assessment:     Lab Results   Component Value Date    CREATININE 1.58 (H) 07/09/2025    BUN 17 07/09/2025     07/09/2025    K 3.1 (L) 07/09/2025     07/09/2025    CO2 31 (H) 07/09/2025     Lab Results   Component Value Date    PTH 13.9 03/11/2025    CALCIUM 9.2 07/09/2025    PHOS 3.5 07/09/2025     Lab Results   Component Value Date    HCT 34.5 (L) 06/23/2025     Prot/Creat Ratio, Urine   Date Value Ref Range Status   03/11/2025 0.1 0.0 - 0.2 Final   12/01/2023 130.1 (H) 10.0 - 107.0 mg/g Final   08/22/2023 51.8 10.0 - 107.0 mg/g Final       Lab Results   Component Value Date    MICALBCREAT 31.9 (H) 03/11/2025    MICALBCREAT 11.4 12/01/2023    MICALBCREAT 8.6 08/22/2023    MICALBCREAT 15.9 05/05/2023           1. Stage 3b chronic kidney disease    2. Hypokalemia    3. Class 2 obesity with body mass index (BMI) of 35.0 to 35.9 in adult, unspecified obesity type, unspecified whether serious comorbidity present    4. Class 2 severe obesity with serious comorbidity and body mass index (BMI) of 36.0 to 36.9 in adult, unspecified obesity type    5. Coronary artery disease due to calcified coronary lesion          Plan:   Return to clinic in 4 months  Baseline creatinine is 1.6-1.9mg/dL.  Orders Placed This Encounter   Procedures    Microalbumin/Creatinine Ratio, Urine    Renal Function Panel    CBC Auto Differential    PTH, Intact    Urinalysis    Protein/Creatinine Ratio, Urine    Renal Function Panel       CKD G3b / A1 PLAN  -history of recurrent renal decline believed related to uncontrolled DM and oncology therapy  -blood pressure  trend has been more stable as of late.  Was doing okay with low-dose Benicar we will resume today for CKD-Mace benefit  -avoiding SGLT2-I d/t history of yeast infections.   -renal function at baseline today  -low risk of renal progression based on KFRE model (discussed that this model does not factor uncontrolled DM)    Hypertension - blood pressure trends reviewed.  Medication list reviewed.  Restart low-dose angiotensin receptor blocker (Benicar 5 mg).      DM type II, uncontrolled - f/u with PCP for continued titration. Discussed that this is currently one of her biggest modifiable risk factors for CKD progression.    MBD evaluation - previously had h/o hypercalcemia d/t vit d tox. Improved recently    Breast cancer - followed by Dr. Betancourt.  Past Tx w/ letrozole and Ibrance (palbociclib)    Anemia of CKD/chronic disease - followed by oncology    Hypokalemia - continue home mg supplement. Start KCl 20mEq daily.     __________________________  James Ireland MD  [facility]    Part of this note has been created using [employer] dictation system. Errors in transcription may not be completely avoided.      KFRE 2-Year: 1.1% at 7/9/2025 11:08 AM  Calculated from:  Serum Creatinine: 1.58 mg/dL at 7/9/2025 11:08 AM  Urine Albumin Creatinine Ratio: 31.9 ug/mg at 3/11/2025  4:24 PM  Age: 69 years  Sex: Female at 7/9/2025 11:08 AM  Has CKD-3 to CKD-5: Yes    KFRE 5-Year: 3.5% at 7/9/2025 11:08 AM  Calculated from:  Serum Creatinine: 1.58 mg/dL at 7/9/2025 11:08 AM  Urine Albumin Creatinine Ratio: 31.9 ug/mg at 3/11/2025  4:24 PM  Age: 69 years  Sex: Female at 7/9/2025 11:08 AM  Has CKD-3 to CKD-5: Yes

## 2025-09-03 ENCOUNTER — DOCUMENT SCAN (OUTPATIENT)
Dept: HOME HEALTH SERVICES | Facility: HOSPITAL | Age: 70
End: 2025-09-03
Payer: MEDICARE

## (undated) DEVICE — SUT VICRYL PLUS 3-0 SH 18IN

## (undated) DEVICE — COVER LIGHT HANDLE 80/CA

## (undated) DEVICE — APPLIER LIGACLIP SM 9.38IN

## (undated) DEVICE — NDL SAFETY 25G X 1.5 ECLIPSE

## (undated) DEVICE — DRAIN JACKSON PRATT GOLD

## (undated) DEVICE — COVER CAMERA OPERATING ROOM

## (undated) DEVICE — SUT MCRYL PLUS 4-0 PS2 27IN

## (undated) DEVICE — SUT VICRYL 2-0 36 CT-1

## (undated) DEVICE — SUT VICRYL+ 2-0 SH 18IN

## (undated) DEVICE — DRAPE THREE-QTR REINF 53X77IN

## (undated) DEVICE — TRAY GENERAL SURGERY SMH

## (undated) DEVICE — YANKAUER OPEN TIP W/O VENT

## (undated) DEVICE — ADHESIVE DERMABOND MINI HV

## (undated) DEVICE — EVACUATOR WOUND BULB 100CC

## (undated) DEVICE — NDL ECLIPSE SAFETY 18GX1-1/2IN

## (undated) DEVICE — SUT VICRYL 4-0 8-18 SH

## (undated) DEVICE — SUT ETHILON 2-0 BLK PS-2

## (undated) DEVICE — DRESSING TRANS 4X4 3/4

## (undated) DEVICE — DRAPE T TRNSVRS LAP 102X78X121

## (undated) DEVICE — GLOVE SENSICARE PI MICRO 7

## (undated) DEVICE — SUT PROLENE 6-0 24 BV-1

## (undated) DEVICE — ELECTRODE REM PLYHSV RETURN 9

## (undated) DEVICE — SPONGE COTTON WOVEN 4X4IN

## (undated) DEVICE — SOL NACL IRR 1000ML BTL

## (undated) DEVICE — APPLIER CLIP LIAGCLIP 9.375IN

## (undated) DEVICE — SUT PROLENE 5-0 24 C-1 BL